# Patient Record
Sex: MALE | Race: WHITE | Employment: FULL TIME | URBAN - NONMETROPOLITAN AREA
[De-identification: names, ages, dates, MRNs, and addresses within clinical notes are randomized per-mention and may not be internally consistent; named-entity substitution may affect disease eponyms.]

---

## 2017-01-04 RX ORDER — INSULIN GLARGINE 100 [IU]/ML
INJECTION, SOLUTION SUBCUTANEOUS
Qty: 90 PEN | Refills: 2 | Status: SHIPPED | OUTPATIENT
Start: 2017-01-04 | End: 2017-04-28 | Stop reason: SDUPTHER

## 2017-01-04 RX ORDER — INSULIN ASPART 100 [IU]/ML
INJECTION, SUSPENSION SUBCUTANEOUS
Qty: 180 PEN | Refills: 2 | Status: SHIPPED | OUTPATIENT
Start: 2017-01-04 | End: 2017-04-28 | Stop reason: SDUPTHER

## 2017-04-22 ENCOUNTER — OFFICE VISIT (OUTPATIENT)
Dept: FAMILY MEDICINE CLINIC | Age: 57
End: 2017-04-22

## 2017-04-22 VITALS
HEART RATE: 108 BPM | OXYGEN SATURATION: 96 % | SYSTOLIC BLOOD PRESSURE: 108 MMHG | DIASTOLIC BLOOD PRESSURE: 74 MMHG | BODY MASS INDEX: 45.1 KG/M2 | WEIGHT: 315 LBS | HEIGHT: 70 IN

## 2017-04-22 DIAGNOSIS — N52.9 ERECTILE DYSFUNCTION, UNSPECIFIED ERECTILE DYSFUNCTION TYPE: ICD-10-CM

## 2017-04-22 DIAGNOSIS — G47.33 OSA ON CPAP: ICD-10-CM

## 2017-04-22 DIAGNOSIS — E55.9 VITAMIN D DEFICIENCY: ICD-10-CM

## 2017-04-22 DIAGNOSIS — I10 ESSENTIAL HYPERTENSION: Primary | ICD-10-CM

## 2017-04-22 DIAGNOSIS — Z99.89 OSA ON CPAP: ICD-10-CM

## 2017-04-22 DIAGNOSIS — Z12.5 PROSTATE CANCER SCREENING: ICD-10-CM

## 2017-04-22 DIAGNOSIS — E78.5 HYPERLIPIDEMIA, UNSPECIFIED HYPERLIPIDEMIA TYPE: ICD-10-CM

## 2017-04-22 DIAGNOSIS — K21.9 GASTROESOPHAGEAL REFLUX DISEASE, ESOPHAGITIS PRESENCE NOT SPECIFIED: ICD-10-CM

## 2017-04-22 DIAGNOSIS — E66.01 OBESITY, MORBID, BMI 50 OR HIGHER (HCC): ICD-10-CM

## 2017-04-22 PROCEDURE — 99214 OFFICE O/P EST MOD 30 MIN: CPT | Performed by: FAMILY MEDICINE

## 2017-04-22 RX ORDER — LISINOPRIL AND HYDROCHLOROTHIAZIDE 12.5; 1 MG/1; MG/1
1 TABLET ORAL DAILY
Qty: 30 TABLET | Refills: 0 | Status: SHIPPED | OUTPATIENT
Start: 2017-04-22 | End: 2017-04-22

## 2017-04-22 ASSESSMENT — ENCOUNTER SYMPTOMS
ABDOMINAL PAIN: 0
BLURRED VISION: 0
SHORTNESS OF BREATH: 0

## 2017-04-26 DIAGNOSIS — Z12.5 PROSTATE CANCER SCREENING: ICD-10-CM

## 2017-04-26 DIAGNOSIS — N52.9 ERECTILE DYSFUNCTION, UNSPECIFIED ERECTILE DYSFUNCTION TYPE: ICD-10-CM

## 2017-04-26 DIAGNOSIS — I10 ESSENTIAL HYPERTENSION: ICD-10-CM

## 2017-04-26 DIAGNOSIS — E78.5 HYPERLIPIDEMIA, UNSPECIFIED HYPERLIPIDEMIA TYPE: ICD-10-CM

## 2017-04-26 DIAGNOSIS — E66.01 OBESITY, MORBID, BMI 50 OR HIGHER (HCC): ICD-10-CM

## 2017-04-26 LAB
ALT SERPL-CCNC: 27 U/L (ref 0–41)
ANION GAP SERPL CALCULATED.3IONS-SCNC: 15 MEQ/L (ref 7–13)
AST SERPL-CCNC: 21 U/L (ref 0–40)
BASOPHILS ABSOLUTE: 0 K/UL (ref 0–0.2)
BASOPHILS RELATIVE PERCENT: 0.4 %
BILIRUBIN URINE: NEGATIVE
BLOOD, URINE: NEGATIVE
BUN BLDV-MCNC: 29 MG/DL (ref 6–20)
CALCIUM SERPL-MCNC: 9.4 MG/DL (ref 8.6–10.2)
CHLORIDE BLD-SCNC: 100 MEQ/L (ref 98–107)
CHOLESTEROL, TOTAL: 109 MG/DL (ref 0–199)
CLARITY: CLEAR
CO2: 26 MEQ/L (ref 22–29)
COLOR: YELLOW
CREAT SERPL-MCNC: 1.3 MG/DL (ref 0.7–1.2)
CREATININE URINE: 101 MG/DL
EOSINOPHILS ABSOLUTE: 0.1 K/UL (ref 0–0.7)
EOSINOPHILS RELATIVE PERCENT: 1 %
GFR AFRICAN AMERICAN: >60
GFR NON-AFRICAN AMERICAN: 56.9
GLUCOSE BLD-MCNC: 277 MG/DL (ref 74–109)
GLUCOSE URINE: >=1000 MG/DL
HBA1C MFR BLD: 11.9 % (ref 4.8–5.9)
HCT VFR BLD CALC: 43.3 % (ref 42–52)
HDLC SERPL-MCNC: 25 MG/DL (ref 40–59)
HEMOGLOBIN: 14.1 G/DL (ref 14–18)
KETONES, URINE: NEGATIVE MG/DL
LDL CHOLESTEROL CALCULATED: 65 MG/DL (ref 0–129)
LEUKOCYTE ESTERASE, URINE: NEGATIVE
LYMPHOCYTES ABSOLUTE: 1.5 K/UL (ref 1–4.8)
LYMPHOCYTES RELATIVE PERCENT: 19 %
MCH RBC QN AUTO: 28.8 PG (ref 27–31.3)
MCHC RBC AUTO-ENTMCNC: 32.7 % (ref 33–37)
MCV RBC AUTO: 88.1 FL (ref 80–100)
MICROALBUMIN UR-MCNC: 3.4 MG/DL
MICROALBUMIN/CREAT UR-RTO: 33.7 MG/G (ref 0–30)
MONOCYTES ABSOLUTE: 0.7 K/UL (ref 0.2–0.8)
MONOCYTES RELATIVE PERCENT: 8.5 %
NEUTROPHILS ABSOLUTE: 5.7 K/UL (ref 1.4–6.5)
NEUTROPHILS RELATIVE PERCENT: 71.1 %
NITRITE, URINE: NEGATIVE
PDW BLD-RTO: 14.1 % (ref 11.5–14.5)
PH UA: 5 (ref 5–9)
PLATELET # BLD: 214 K/UL (ref 130–400)
POTASSIUM SERPL-SCNC: 4.7 MEQ/L (ref 3.5–5.1)
PROSTATE SPECIFIC ANTIGEN: 0.37 NG/ML (ref 0–3.89)
PROTEIN UA: ABNORMAL MG/DL
RBC # BLD: 4.91 M/UL (ref 4.7–6.1)
SODIUM BLD-SCNC: 141 MEQ/L (ref 132–144)
SPECIFIC GRAVITY UA: 1.02 (ref 1–1.03)
TRIGL SERPL-MCNC: 96 MG/DL (ref 0–200)
TSH SERPL DL<=0.05 MIU/L-ACNC: 1.57 UIU/ML (ref 0.27–4.2)
UROBILINOGEN, URINE: 0.2 E.U./DL
WBC # BLD: 8 K/UL (ref 4.8–10.8)

## 2017-04-27 LAB — TESTOSTERONE TOTAL-MALE: 118 NG/DL (ref 300–890)

## 2017-04-28 ENCOUNTER — OFFICE VISIT (OUTPATIENT)
Dept: SURGERY | Age: 57
End: 2017-04-28

## 2017-04-28 VITALS
SYSTOLIC BLOOD PRESSURE: 94 MMHG | BODY MASS INDEX: 45.1 KG/M2 | HEART RATE: 112 BPM | DIASTOLIC BLOOD PRESSURE: 60 MMHG | HEIGHT: 70 IN | WEIGHT: 315 LBS

## 2017-04-28 DIAGNOSIS — E66.01 MORBID OBESITY DUE TO EXCESS CALORIES (HCC): ICD-10-CM

## 2017-04-28 DIAGNOSIS — E29.1 HYPOGONADISM MALE: Primary | ICD-10-CM

## 2017-04-28 LAB — GLUCOSE BLD-MCNC: 329 MG/DL

## 2017-04-28 PROCEDURE — 82962 GLUCOSE BLOOD TEST: CPT | Performed by: INTERNAL MEDICINE

## 2017-04-28 PROCEDURE — 99213 OFFICE O/P EST LOW 20 MIN: CPT | Performed by: INTERNAL MEDICINE

## 2017-04-29 PROBLEM — E29.1 HYPOGONADISM IN MALE: Status: ACTIVE | Noted: 2017-04-01

## 2017-05-01 DIAGNOSIS — E29.1 HYPOGONADISM IN MALE: Primary | ICD-10-CM

## 2017-05-01 RX ORDER — TESTOSTERONE 12.5 MG/1.25G
GEL TOPICAL
Qty: 1 BOTTLE | Refills: 5 | Status: SHIPPED | OUTPATIENT
Start: 2017-05-01 | End: 2017-05-02 | Stop reason: ALTCHOICE

## 2017-05-02 DIAGNOSIS — E29.1 HYPOGONADISM IN MALE: Primary | ICD-10-CM

## 2017-05-02 RX ORDER — TESTOSTERONE 20.25 MG/1.25G
GEL TOPICAL
Qty: 1.25 G | Refills: 5 | OUTPATIENT
Start: 2017-05-02 | End: 2018-01-26 | Stop reason: SDUPTHER

## 2017-05-22 ENCOUNTER — OFFICE VISIT (OUTPATIENT)
Dept: FAMILY MEDICINE CLINIC | Age: 57
End: 2017-05-22

## 2017-05-22 VITALS
OXYGEN SATURATION: 96 % | TEMPERATURE: 98.1 F | HEART RATE: 105 BPM | SYSTOLIC BLOOD PRESSURE: 120 MMHG | DIASTOLIC BLOOD PRESSURE: 74 MMHG | BODY MASS INDEX: 45.1 KG/M2 | WEIGHT: 315 LBS | HEIGHT: 70 IN

## 2017-05-22 DIAGNOSIS — R06.2 WHEEZING: ICD-10-CM

## 2017-05-22 DIAGNOSIS — R05.9 COUGH: ICD-10-CM

## 2017-05-22 DIAGNOSIS — J20.9 ACUTE BRONCHITIS, UNSPECIFIED ORGANISM: Primary | ICD-10-CM

## 2017-05-22 PROCEDURE — 99213 OFFICE O/P EST LOW 20 MIN: CPT | Performed by: NURSE PRACTITIONER

## 2017-05-22 RX ORDER — PROMETHAZINE HYDROCHLORIDE AND CODEINE PHOSPHATE 6.25; 1 MG/5ML; MG/5ML
5 SYRUP ORAL EVERY 4 HOURS PRN
Qty: 200 ML | Refills: 0 | Status: SHIPPED | OUTPATIENT
Start: 2017-05-22 | End: 2017-05-29

## 2017-05-22 RX ORDER — METHYLPREDNISOLONE 4 MG/1
TABLET ORAL
Qty: 21 TABLET | Refills: 0 | Status: SHIPPED | OUTPATIENT
Start: 2017-05-22 | End: 2017-05-28

## 2017-05-22 RX ORDER — AZITHROMYCIN 250 MG/1
TABLET, FILM COATED ORAL
Qty: 1 PACKET | Refills: 0 | Status: SHIPPED | OUTPATIENT
Start: 2017-05-22 | End: 2017-07-20 | Stop reason: ALTCHOICE

## 2017-05-22 ASSESSMENT — PATIENT HEALTH QUESTIONNAIRE - PHQ9
1. LITTLE INTEREST OR PLEASURE IN DOING THINGS: 0
2. FEELING DOWN, DEPRESSED OR HOPELESS: 0
SUM OF ALL RESPONSES TO PHQ QUESTIONS 1-9: 0
SUM OF ALL RESPONSES TO PHQ9 QUESTIONS 1 & 2: 0

## 2017-05-22 ASSESSMENT — ENCOUNTER SYMPTOMS
WHEEZING: 1
SINUS PRESSURE: 1
SHORTNESS OF BREATH: 1
RHINORRHEA: 1
COUGH: 1
SORE THROAT: 1

## 2017-05-24 ENCOUNTER — PROCEDURE VISIT (OUTPATIENT)
Dept: FAMILY MEDICINE CLINIC | Age: 57
End: 2017-05-24

## 2017-05-24 VITALS
WEIGHT: 315 LBS | HEART RATE: 88 BPM | OXYGEN SATURATION: 93 % | HEIGHT: 70 IN | BODY MASS INDEX: 45.1 KG/M2 | SYSTOLIC BLOOD PRESSURE: 132 MMHG | DIASTOLIC BLOOD PRESSURE: 82 MMHG

## 2017-05-24 DIAGNOSIS — L91.8 CUTANEOUS SKIN TAGS: Primary | ICD-10-CM

## 2017-05-24 DIAGNOSIS — N28.9 RENAL INSUFFICIENCY: ICD-10-CM

## 2017-05-24 LAB
ANION GAP SERPL CALCULATED.3IONS-SCNC: 15 MEQ/L (ref 7–13)
BUN BLDV-MCNC: 20 MG/DL (ref 6–20)
CALCIUM SERPL-MCNC: 8.9 MG/DL (ref 8.6–10.2)
CHLORIDE BLD-SCNC: 102 MEQ/L (ref 98–107)
CO2: 26 MEQ/L (ref 22–29)
CREAT SERPL-MCNC: 0.78 MG/DL (ref 0.7–1.2)
GFR AFRICAN AMERICAN: >60
GFR NON-AFRICAN AMERICAN: >60
GLUCOSE BLD-MCNC: 156 MG/DL (ref 74–109)
POTASSIUM SERPL-SCNC: 4.6 MEQ/L (ref 3.5–5.1)
SODIUM BLD-SCNC: 143 MEQ/L (ref 132–144)

## 2017-05-24 PROCEDURE — 11200 RMVL SKIN TAGS UP TO&INC 15: CPT | Performed by: FAMILY MEDICINE

## 2017-05-24 PROCEDURE — 11201 RMVL SKIN TAGS EA ADDL 10: CPT | Performed by: FAMILY MEDICINE

## 2017-05-24 ASSESSMENT — ENCOUNTER SYMPTOMS
ABDOMINAL PAIN: 0
SHORTNESS OF BREATH: 0

## 2017-06-02 ENCOUNTER — TELEPHONE (OUTPATIENT)
Dept: FAMILY MEDICINE CLINIC | Age: 57
End: 2017-06-02

## 2017-07-20 ENCOUNTER — OFFICE VISIT (OUTPATIENT)
Dept: FAMILY MEDICINE CLINIC | Age: 57
End: 2017-07-20

## 2017-07-20 VITALS
OXYGEN SATURATION: 95 % | SYSTOLIC BLOOD PRESSURE: 116 MMHG | HEIGHT: 70 IN | BODY MASS INDEX: 45.1 KG/M2 | WEIGHT: 315 LBS | DIASTOLIC BLOOD PRESSURE: 80 MMHG | HEART RATE: 111 BPM

## 2017-07-20 DIAGNOSIS — I10 ESSENTIAL HYPERTENSION: Primary | ICD-10-CM

## 2017-07-20 DIAGNOSIS — Z11.59 NEED FOR HEPATITIS C SCREENING TEST: ICD-10-CM

## 2017-07-20 DIAGNOSIS — I10 ESSENTIAL HYPERTENSION: ICD-10-CM

## 2017-07-20 DIAGNOSIS — E66.01 OBESITY, MORBID, BMI 50 OR HIGHER (HCC): ICD-10-CM

## 2017-07-20 DIAGNOSIS — R53.83 FATIGUE, UNSPECIFIED TYPE: ICD-10-CM

## 2017-07-20 DIAGNOSIS — E78.5 HYPERLIPIDEMIA, UNSPECIFIED HYPERLIPIDEMIA TYPE: ICD-10-CM

## 2017-07-20 LAB
ANION GAP SERPL CALCULATED.3IONS-SCNC: 11 MEQ/L (ref 7–13)
BUN BLDV-MCNC: 29 MG/DL (ref 6–20)
CALCIUM SERPL-MCNC: 9.2 MG/DL (ref 8.6–10.2)
CHLORIDE BLD-SCNC: 100 MEQ/L (ref 98–107)
CO2: 29 MEQ/L (ref 22–29)
CREAT SERPL-MCNC: 0.96 MG/DL (ref 0.7–1.2)
GFR AFRICAN AMERICAN: >60
GFR NON-AFRICAN AMERICAN: >60
GLUCOSE BLD-MCNC: 242 MG/DL (ref 74–109)
HBA1C MFR BLD: 10.1 % (ref 4.8–5.9)
HEPATITIS C ANTIBODY INTERPRETATION: NORMAL
POTASSIUM SERPL-SCNC: 4.6 MEQ/L (ref 3.5–5.1)
SODIUM BLD-SCNC: 140 MEQ/L (ref 132–144)
VITAMIN B-12: 318 PG/ML (ref 211–946)

## 2017-07-20 PROCEDURE — 99214 OFFICE O/P EST MOD 30 MIN: CPT | Performed by: FAMILY MEDICINE

## 2017-07-20 RX ORDER — LISINOPRIL AND HYDROCHLOROTHIAZIDE 12.5; 1 MG/1; MG/1
1 TABLET ORAL DAILY
Qty: 30 TABLET | Refills: 0 | Status: SHIPPED | OUTPATIENT
Start: 2017-07-20 | End: 2017-08-28 | Stop reason: SDUPTHER

## 2017-07-20 ASSESSMENT — ENCOUNTER SYMPTOMS
ABDOMINAL PAIN: 0
SHORTNESS OF BREATH: 0

## 2017-08-28 DIAGNOSIS — I10 ESSENTIAL HYPERTENSION: ICD-10-CM

## 2017-08-28 RX ORDER — LISINOPRIL AND HYDROCHLOROTHIAZIDE 12.5; 1 MG/1; MG/1
1 TABLET ORAL DAILY
Qty: 90 TABLET | Refills: 3 | Status: SHIPPED | OUTPATIENT
Start: 2017-08-28 | End: 2018-01-30 | Stop reason: SINTOL

## 2017-09-08 ENCOUNTER — OFFICE VISIT (OUTPATIENT)
Dept: SURGERY | Age: 57
End: 2017-09-08

## 2017-09-08 VITALS
BODY MASS INDEX: 45.1 KG/M2 | WEIGHT: 315 LBS | DIASTOLIC BLOOD PRESSURE: 82 MMHG | HEIGHT: 70 IN | SYSTOLIC BLOOD PRESSURE: 138 MMHG | HEART RATE: 110 BPM

## 2017-09-08 LAB — GLUCOSE BLD-MCNC: 289 MG/DL

## 2017-09-08 PROCEDURE — 99213 OFFICE O/P EST LOW 20 MIN: CPT | Performed by: INTERNAL MEDICINE

## 2017-09-08 PROCEDURE — 82962 GLUCOSE BLOOD TEST: CPT | Performed by: INTERNAL MEDICINE

## 2017-11-28 ENCOUNTER — TELEPHONE (OUTPATIENT)
Dept: FAMILY MEDICINE CLINIC | Age: 57
End: 2017-11-28

## 2017-11-29 ENCOUNTER — OFFICE VISIT (OUTPATIENT)
Dept: FAMILY MEDICINE CLINIC | Age: 57
End: 2017-11-29

## 2017-11-29 VITALS
BODY MASS INDEX: 45.1 KG/M2 | TEMPERATURE: 98.2 F | OXYGEN SATURATION: 95 % | DIASTOLIC BLOOD PRESSURE: 76 MMHG | SYSTOLIC BLOOD PRESSURE: 122 MMHG | WEIGHT: 315 LBS | HEART RATE: 109 BPM | HEIGHT: 70 IN

## 2017-11-29 DIAGNOSIS — J20.9 ACUTE BRONCHITIS, UNSPECIFIED ORGANISM: ICD-10-CM

## 2017-11-29 DIAGNOSIS — R06.02 SHORTNESS OF BREATH: Primary | ICD-10-CM

## 2017-11-29 PROCEDURE — 99213 OFFICE O/P EST LOW 20 MIN: CPT | Performed by: NURSE PRACTITIONER

## 2017-11-29 RX ORDER — METHYLPREDNISOLONE 4 MG/1
TABLET ORAL
Qty: 21 TABLET | Refills: 0 | Status: SHIPPED | OUTPATIENT
Start: 2017-11-29 | End: 2017-12-05

## 2017-11-29 RX ORDER — ALBUTEROL SULFATE 90 UG/1
2 AEROSOL, METERED RESPIRATORY (INHALATION) EVERY 6 HOURS PRN
Qty: 1 INHALER | Refills: 3 | Status: SHIPPED | OUTPATIENT
Start: 2017-11-29 | End: 2018-01-26 | Stop reason: SDUPTHER

## 2017-11-29 RX ORDER — DOXYCYCLINE HYCLATE 100 MG
100 TABLET ORAL 2 TIMES DAILY
Qty: 20 TABLET | Refills: 0 | Status: SHIPPED | OUTPATIENT
Start: 2017-11-29 | End: 2017-12-09

## 2017-11-29 ASSESSMENT — ENCOUNTER SYMPTOMS
SHORTNESS OF BREATH: 1
WHEEZING: 1
RHINORRHEA: 1
EYES NEGATIVE: 1
COUGH: 1

## 2017-11-29 NOTE — PROGRESS NOTES
Subjective  Chief Complaint   Patient presents with    Cough     pt states that he went to [facility] in clinic and was diagnosed with bronchitis & a virus and states that his cough is not getting better. states that tessalon perles are not helping and that it was making him hallucinate. Patient states he is having less secretions than before, but the cough still takes his breath away and he fells like he can't stop coughing. Denies any further fevers. Can't sleep at night due to the cough. Cough   This is a new problem. The current episode started in the past 7 days. The problem has been unchanged. The problem occurs every few minutes. The cough is productive of sputum. Associated symptoms include ear congestion, a fever, myalgias, nasal congestion, rhinorrhea, shortness of breath and wheezing. Pertinent negatives include no chest pain or ear pain. The symptoms are aggravated by lying down. He has tried prescription cough suppressant and OTC cough suppressant (mucinex, abx) for the symptoms. The treatment provided mild relief. His past medical history is significant for bronchitis.        Patient Active Problem List    Diagnosis Date Noted    ED (erectile dysfunction)     GERD (gastroesophageal reflux disease)     EVELYN on CPAP     Obesity, morbid, BMI 50 or higher ([address]     Hypogonadism in male 04/01/2017    Diabetes mellitus out of control ([address] 09/17/2011    Hypertension 09/17/2011    Hyperlipidemia 09/17/2011    Vitamin D deficiency 10/17/2008     Past Medical History:   Diagnosis Date    ED (erectile dysfunction)     GERD (gastroesophageal reflux disease)     History of cardiovascular stress test     Normal; Saw Dr. Radha Lee History of EKG 10/29/14    Hyperlipidemia     Hypertension     Hypogonadism in male 04/2017    Obesity, morbid, BMI 50 or higher ([address]     EVELYN on CPAP     Type II or unspecified type diabetes mellitus without mention of complication, not stated as uncontrolled Vitals reviewed. Assessment & Plan    1. Shortness of breath  albuterol sulfate HFA (VENTOLIN HFA) 108 (90 Base) MCG/ACT inhaler    methylPREDNISolone (MEDROL DOSEPACK) 4 MG tablet    XR CHEST STANDARD (2 VW)   2. Acute bronchitis, unspecified organism  albuterol sulfate HFA (VENTOLIN HFA) 108 (90 Base) MCG/ACT inhaler    methylPREDNISolone (MEDROL DOSEPACK) 4 MG tablet    doxycycline hyclate (VIBRA-TABS) 100 MG tablet    XR CHEST STANDARD (2 VW)     Pt advised to rest and drink plenty of fluids. Finish all antibiotics even if feeling better. OTC analgesics for symptom management. Salt water gargle for sore throat. Chest xray as ordered today. Pt will monitor blood glucose closely at home d/t risk of increased glucose with medrol dosepack. If he sees increase in his numbers, he will call Dr. Jeana Mosher to get further input as far as insulin recommendations. RTO if symptoms worsen or if no improvement in 72 hours. Side effects, adverse effects of the medication prescribed today, as well as treatment plan/ rationale and result expectations have been discussed with the patient who expresses understanding and desires to proceed. Close follow up to evaluate treatment results and for coordination of care. I have reviewed the patient's medical history in detail and updated the computerized patient record. As always, patient is advised that if symptoms worsen in any way they will proceed to the nearest emergency room.        Orders Placed This Encounter   Procedures    XR CHEST STANDARD (2 VW)     Standing Status:   Future     Number of Occurrences:   1     Standing Expiration Date:   11/29/2018     Order Specific Question:   Reason for exam:     Answer:   bronchitis and shortness of breath       Orders Placed This Encounter   Medications    albuterol sulfate HFA (VENTOLIN HFA) 108 (90 Base) MCG/ACT inhaler     Sig: Inhale 2 puffs into the lungs every 6 hours as needed for Wheezing     Dispense:  1 Inhaler     Refill:  3    methylPREDNISolone (MEDROL DOSEPACK) 4 MG tablet     Sig: Take by mouth. Dispense:  21 tablet     Refill:  0    doxycycline hyclate (VIBRA-TABS) 100 MG tablet     Sig: Take 1 tablet by mouth 2 times daily for 10 days     Dispense:  20 tablet     Refill:  0       Return if symptoms worsen or fail to improve.     Serene Sanchez, CNP

## 2017-12-11 ENCOUNTER — TELEPHONE (OUTPATIENT)
Dept: FAMILY MEDICINE CLINIC | Age: 57
End: 2017-12-11

## 2017-12-11 RX ORDER — PROMETHAZINE HYDROCHLORIDE AND CODEINE PHOSPHATE 6.25; 1 MG/5ML; MG/5ML
5 SYRUP ORAL EVERY 6 HOURS PRN
Qty: 118 ML | Refills: 0 | Status: SHIPPED | OUTPATIENT
Start: 2017-12-11 | End: 2017-12-18

## 2017-12-19 ENCOUNTER — TELEPHONE (OUTPATIENT)
Dept: FAMILY MEDICINE CLINIC | Age: 57
End: 2017-12-19

## 2017-12-19 NOTE — TELEPHONE ENCOUNTER
He was on an antibiotic/doxycycline along with a Medrol Dosepak and an inhaler called albuterol prescribed by [facility] a few weeks ago. He had a chest x-ray less than 3 weeks ago that did not show any active disease. I would recommend that he take Mucinex DM which is available over-the-counter; he can get the 12 hour formulation and take 1 tablet twice a day for 7-10 days. If he is not feeling any better after that he should probably be seen.

## 2017-12-19 NOTE — TELEPHONE ENCOUNTER
Pt was seen by Justen Fowler on 11/28 and went to the walk-in clinic on 11/25 for bronchitis symptoms. Pt is still coughing a lot and was wondering if there was something different that you could call in to the pharmacy?  ROXI MAYES

## 2018-01-05 RX ORDER — OMEPRAZOLE 20 MG/1
CAPSULE, DELAYED RELEASE ORAL
Qty: 90 CAPSULE | Status: CANCELLED | OUTPATIENT
Start: 2018-01-05

## 2018-01-09 RX ORDER — OMEPRAZOLE 20 MG/1
CAPSULE, DELAYED RELEASE ORAL
Qty: 90 CAPSULE | Refills: 1 | Status: SHIPPED | OUTPATIENT
Start: 2018-01-09 | End: 2018-06-10 | Stop reason: SDUPTHER

## 2018-01-22 ENCOUNTER — TELEPHONE (OUTPATIENT)
Dept: FAMILY MEDICINE CLINIC | Age: 58
End: 2018-01-22

## 2018-01-23 ENCOUNTER — HOSPITAL ENCOUNTER (OUTPATIENT)
Dept: GENERAL RADIOLOGY | Age: 58
Discharge: HOME | End: 2018-01-23
Payer: COMMERCIAL

## 2018-01-23 ENCOUNTER — OFFICE VISIT (OUTPATIENT)
Dept: FAMILY MEDICINE CLINIC | Age: 58
End: 2018-01-23
Payer: COMMERCIAL

## 2018-01-23 VITALS
TEMPERATURE: 98 F | WEIGHT: 315 LBS | HEIGHT: 70 IN | DIASTOLIC BLOOD PRESSURE: 70 MMHG | SYSTOLIC BLOOD PRESSURE: 130 MMHG | HEART RATE: 108 BPM | BODY MASS INDEX: 45.1 KG/M2 | OXYGEN SATURATION: 94 %

## 2018-01-23 DIAGNOSIS — E78.5 HYPERLIPIDEMIA, UNSPECIFIED HYPERLIPIDEMIA TYPE: ICD-10-CM

## 2018-01-23 DIAGNOSIS — R06.02 SHORTNESS OF BREATH: ICD-10-CM

## 2018-01-23 DIAGNOSIS — I10 ESSENTIAL HYPERTENSION: ICD-10-CM

## 2018-01-23 DIAGNOSIS — R05.3 CHRONIC COUGH: ICD-10-CM

## 2018-01-23 DIAGNOSIS — R63.5 WEIGHT GAIN: ICD-10-CM

## 2018-01-23 DIAGNOSIS — R05.3 CHRONIC COUGH: Primary | ICD-10-CM

## 2018-01-23 DIAGNOSIS — R60.0 LOCALIZED EDEMA: ICD-10-CM

## 2018-01-23 LAB
ALBUMIN SERPL-MCNC: 3.8 G/DL (ref 3.9–4.9)
ALP BLD-CCNC: 98 U/L (ref 35–104)
ALT SERPL-CCNC: 32 U/L (ref 0–41)
ANION GAP SERPL CALCULATED.3IONS-SCNC: 17 MEQ/L (ref 7–13)
AST SERPL-CCNC: 22 U/L (ref 0–40)
BILIRUB SERPL-MCNC: 0.1 MG/DL (ref 0–1.2)
BUN BLDV-MCNC: 25 MG/DL (ref 6–20)
CALCIUM SERPL-MCNC: 9.1 MG/DL (ref 8.6–10.2)
CHLORIDE BLD-SCNC: 103 MEQ/L (ref 98–107)
CO2: 27 MEQ/L (ref 22–29)
CREAT SERPL-MCNC: 1.07 MG/DL (ref 0.7–1.2)
GFR AFRICAN AMERICAN: >60
GFR NON-AFRICAN AMERICAN: >60
GLOBULIN: 2.8 G/DL (ref 2.3–3.5)
GLUCOSE BLD-MCNC: 240 MG/DL (ref 74–109)
HCT VFR BLD CALC: 42.4 % (ref 42–52)
HEMOGLOBIN: 13.5 G/DL (ref 14–18)
MCH RBC QN AUTO: 30 PG (ref 27–31.3)
MCHC RBC AUTO-ENTMCNC: 32 % (ref 33–37)
MCV RBC AUTO: 93.8 FL (ref 80–100)
PDW BLD-RTO: 15 % (ref 11.5–14.5)
PLATELET # BLD: 222 K/UL (ref 130–400)
POTASSIUM SERPL-SCNC: 4.9 MEQ/L (ref 3.5–5.1)
PRO-BNP: 111 PG/ML
RBC # BLD: 4.52 M/UL (ref 4.7–6.1)
SODIUM BLD-SCNC: 147 MEQ/L (ref 132–144)
TOTAL PROTEIN: 6.6 G/DL (ref 6.4–8.1)
WBC # BLD: 8.7 K/UL (ref 4.8–10.8)

## 2018-01-23 PROCEDURE — 99214 OFFICE O/P EST MOD 30 MIN: CPT | Performed by: NURSE PRACTITIONER

## 2018-01-23 PROCEDURE — 93000 ELECTROCARDIOGRAM COMPLETE: CPT | Performed by: NURSE PRACTITIONER

## 2018-01-23 PROCEDURE — 71046 X-RAY EXAM CHEST 2 VIEWS: CPT

## 2018-01-23 RX ORDER — METHYLPREDNISOLONE 4 MG/1
TABLET ORAL
Qty: 21 TABLET | Refills: 0 | Status: SHIPPED | OUTPATIENT
Start: 2018-01-23 | End: 2018-01-29

## 2018-01-23 RX ORDER — FUROSEMIDE 20 MG/1
20 TABLET ORAL DAILY
Qty: 30 TABLET | Refills: 0 | Status: SHIPPED | OUTPATIENT
Start: 2018-01-23 | End: 2018-04-04 | Stop reason: ALTCHOICE

## 2018-01-23 ASSESSMENT — ENCOUNTER SYMPTOMS
SINUS PAIN: 0
SINUS PRESSURE: 0
WHEEZING: 1
SHORTNESS OF BREATH: 1
RHINORRHEA: 0
COUGH: 1

## 2018-01-23 NOTE — PROGRESS NOTES
Subjective  Chief Complaint   Patient presents with    Cough     pt states that he has been fighting a cough since the end of November. Cough   This is a recurrent problem. The current episode started more than 1 month ago (since november). The problem has been unchanged. The cough is non-productive. Associated symptoms include ear congestion, ear pain, a fever, nasal congestion, postnasal drip, shortness of breath, sweats and wheezing. Pertinent negatives include no chest pain, chills or rhinorrhea. The symptoms are aggravated by lying down. He has tried prescription cough suppressant and steroid inhaler (augmentin, omnicef, doxycycline) for the symptoms. The treatment provided mild relief. His past medical history is significant for pneumonia. There is no history of asthma. He did feel better while he was on the antibiotics and steroids, but the cough never went away. Says his tongue has been raw since taking the augmentin. Has also been having significant swelling of his legs that has been there for about a month now. Says he has had swelling in the past but usually the hctz that he is on will take care of it. Has not had it this bad before. Has seen Dr. Basim Hendrix in the past but it has been over 2 years ago. Has shortness of breath that is worse when he lays down. Says he cannot lay down to sleep. Has been sleeping in a chair.     Patient Active Problem List    Diagnosis Date Noted    ED (erectile dysfunction)     GERD (gastroesophageal reflux disease)     EVELYN on CPAP     Obesity, morbid, BMI 50 or higher ([address]     Hypogonadism in male 04/01/2017    Diabetes mellitus out of control ([address] 09/17/2011    Hypertension 09/17/2011    Hyperlipidemia 09/17/2011    Vitamin D deficiency 10/17/2008     Past Medical History:   Diagnosis Date    ED (erectile dysfunction)     GERD (gastroesophageal reflux disease)     History of cardiovascular stress test     Normal; Saw Dr. Madan Goldstein History of Neurological: He is alert and oriented to person, place, and time. Skin: Skin is warm and dry. No rash noted. He is not diaphoretic. No erythema. No pallor. Psychiatric: He has a normal mood and affect. His behavior is normal. Judgment and thought content normal.     Assessment & Plan    1. Chronic cough  XR CHEST STANDARD (2 VW)    methylPREDNISolone (MEDROL DOSEPACK) 4 MG tablet   2. Shortness of breath  Brain Natriuretic Peptide    CBC    XR CHEST STANDARD (2 VW)    methylPREDNISolone (MEDROL DOSEPACK) 4 MG tablet    EKG 12 lead    [address], [place] - In[facility] Cardiology   3. Localized edema  Brain Natriuretic Peptide    Comprehensive Metabolic Panel    XR CHEST STANDARD (2 VW)    furosemide (LASIX) 20 MG tablet    [address] - [facility]   4. Weight gain  Brain Natriuretic Peptide    Comprehensive Metabolic Panel   5. Essential hypertension  Ad Leiva MD [facility]) - Invasive Cardiology   6. Hyperlipidemia, unspecified hyperlipidemia type  [facility] [address], [place] - Invasive Cardiology     Plan as above. Will call with all results. Start lasix 20 mg daily. Referral to cardiology. F/u on Friday for recheck. ER if symptoms worsen at all. Side effects, adverse effects of the medication prescribed today, as well as treatment plan/ rationale and result expectations have been discussed with the patient who expresses understanding and desires to proceed. Close follow up to evaluate treatment results and for coordination of care. I have reviewed the patient's medical history in detail and updated the computerized patient record. As always, patient is advised that if symptoms worsen in any way they will proceed to the nearest emergency room.        Orders Placed This Encounter   Procedures    XR CHEST STANDARD (2 VW)     Standing Status:   Future     Standing Expiration Date:   1/23/2019     Order Specific

## 2018-01-24 DIAGNOSIS — R06.02 SHORTNESS OF BREATH: Primary | ICD-10-CM

## 2018-01-26 ENCOUNTER — OFFICE VISIT (OUTPATIENT)
Dept: FAMILY MEDICINE CLINIC | Age: 58
End: 2018-01-26
Payer: COMMERCIAL

## 2018-01-26 VITALS
HEIGHT: 70 IN | BODY MASS INDEX: 45.1 KG/M2 | WEIGHT: 315 LBS | DIASTOLIC BLOOD PRESSURE: 76 MMHG | OXYGEN SATURATION: 93 % | HEART RATE: 105 BPM | SYSTOLIC BLOOD PRESSURE: 120 MMHG | TEMPERATURE: 98.2 F

## 2018-01-26 DIAGNOSIS — R06.02 SHORTNESS OF BREATH: Primary | ICD-10-CM

## 2018-01-26 DIAGNOSIS — E78.5 HYPERLIPIDEMIA, UNSPECIFIED HYPERLIPIDEMIA TYPE: ICD-10-CM

## 2018-01-26 DIAGNOSIS — R60.0 LOCALIZED EDEMA: ICD-10-CM

## 2018-01-26 DIAGNOSIS — E29.1 HYPOGONADISM IN MALE: ICD-10-CM

## 2018-01-26 DIAGNOSIS — J20.9 ACUTE BRONCHITIS, UNSPECIFIED ORGANISM: ICD-10-CM

## 2018-01-26 DIAGNOSIS — G47.33 OSA ON CPAP: ICD-10-CM

## 2018-01-26 DIAGNOSIS — I10 ESSENTIAL HYPERTENSION: ICD-10-CM

## 2018-01-26 DIAGNOSIS — Z99.89 OSA ON CPAP: ICD-10-CM

## 2018-01-26 DIAGNOSIS — E66.01 OBESITY, MORBID, BMI 50 OR HIGHER (HCC): ICD-10-CM

## 2018-01-26 PROCEDURE — 99214 OFFICE O/P EST MOD 30 MIN: CPT | Performed by: NURSE PRACTITIONER

## 2018-01-26 RX ORDER — FLUTICASONE PROPIONATE 50 MCG
2 SPRAY, SUSPENSION (ML) NASAL DAILY
Qty: 1 BOTTLE | Refills: 0 | Status: CANCELLED | OUTPATIENT
Start: 2018-01-26

## 2018-01-26 RX ORDER — ALBUTEROL SULFATE 90 UG/1
2 AEROSOL, METERED RESPIRATORY (INHALATION) EVERY 6 HOURS PRN
Qty: 1 INHALER | Refills: 3 | Status: SHIPPED | OUTPATIENT
Start: 2018-01-26 | End: 2018-10-09 | Stop reason: ALTCHOICE

## 2018-01-26 RX ORDER — TESTOSTERONE 20.25 MG/1.25G
GEL TOPICAL
Qty: 1.25 G | Refills: 5 | Status: SHIPPED | OUTPATIENT
Start: 2018-01-26 | End: 2018-10-09 | Stop reason: ALTCHOICE

## 2018-01-26 ASSESSMENT — ENCOUNTER SYMPTOMS
SHORTNESS OF BREATH: 1
COUGH: 1

## 2018-01-26 NOTE — PROGRESS NOTES
(See Comments)     hallucinations       Review of Systems   Constitutional: Positive for fatigue. Negative for chills, diaphoresis and fever. HENT: Negative for congestion. Respiratory: Positive for cough and shortness of breath. Cardiovascular: Positive for leg swelling. Negative for chest pain and palpitations. Objective  Vitals:    01/26/18 1516   BP: 120/76   Pulse: 105   Temp: 98.2 °F (36.8 °C)   TempSrc: Tympanic   SpO2: 93%   Weight: (!) 405 lb (183.7 kg)   Height: 5' 10\" (1.778 m)     Physical Exam   Constitutional: He is oriented to person, place, and time. He appears well-developed and well-nourished. No distress. HENT:   Head: Normocephalic and atraumatic. Right Ear: External ear normal.   Left Ear: External ear normal.   Cardiovascular: Normal rate, regular rhythm and normal heart sounds. Pulmonary/Chest: Effort normal and breath sounds normal.   Musculoskeletal: He exhibits edema (1+ pitting edema). Neurological: He is alert and oriented to person, place, and time. Skin: Skin is warm and dry. No rash noted. He is not diaphoretic. No erythema. No pallor. Psychiatric: He has a normal mood and affect. His behavior is normal. Judgment and thought content normal.     Assessment & Plan    1. Shortness of breath  albuterol sulfate HFA (VENTOLIN HFA) 108 (90 Base) MCG/ACT inhaler    FULL PFT STUDY WITH PRE AND POST    Echocardiogram complete   2. Hypogonadism in male  Testosterone (ANDROGEL) 20.25 MG/1.25GM (1.62%) GEL   3. Acute bronchitis, unspecified organism  albuterol sulfate HFA (VENTOLIN HFA) 108 (90 Base) MCG/ACT inhaler   4. EVELYN on CPAP     5. Essential hypertension  Basic Metabolic Panel   6. Hyperlipidemia, unspecified hyperlipidemia type  Lipid Panel   7. Uncontrolled type 2 diabetes mellitus without complication, without long-term current use of insulin (HCC)  Hemoglobin A1C   8. Obesity, morbid, BMI 50 or higher ([facility]     9.  Localized edema  Echocardiogram complete Continue albuterol and lasix. Echo and PFTs as ordered. Blood work on Monday. See cardiology Tuesday. Pt will schedule with endocrinology. Nasal saline spray prior to wearing CPAP at night. F/u in 2 weeks. Side effects, adverse effects of the medication prescribed today, as well as treatment plan/ rationale and result expectations have been discussed with the patient who expresses understanding and desires to proceed. Close follow up to evaluate treatment results and for coordination of care. I have reviewed the patient's medical history in detail and updated the computerized patient record. As always, patient is advised that if symptoms worsen in any way they will proceed to the nearest emergency room. Orders Placed This Encounter   Procedures    Hemoglobin A1C     Standing Status:   Future     Standing Expiration Date:   1/26/2019    Lipid Panel     Standing Status:   Future     Standing Expiration Date:   1/26/2019     Order Specific Question:   Is Patient Fasting?/# of Hours     Answer:   y/12    Basic Metabolic Panel     Standing Status:   Future     Standing Expiration Date:   1/26/2019    Echocardiogram complete     Standing Status:   Future     Standing Expiration Date:   3/27/2018     Order Specific Question:   Reason for exam:     Answer:   bilateral lower ext edema, shortness of breath    FULL PFT STUDY WITH PRE AND POST     Standing Status:   Future     Standing Expiration Date:   1/26/2019       Orders Placed This Encounter   Medications    Testosterone (ANDROGEL) 20.25 MG/1.25GM (1.62%) GEL     Sig: Apply 20.25 mg daily as directed. .     Dispense:  1.25 g     Refill:  5    albuterol sulfate HFA (VENTOLIN HFA) 108 (90 Base) MCG/ACT inhaler     Sig: Inhale 2 puffs into the lungs every 6 hours as needed for Wheezing     Dispense:  1 Inhaler     Refill:  3       Return in about 2 weeks (around 2/9/2018).     Clement Pulliam, CNP

## 2018-01-30 ENCOUNTER — OFFICE VISIT (OUTPATIENT)
Dept: CARDIOLOGY CLINIC | Age: 58
End: 2018-01-30
Payer: COMMERCIAL

## 2018-01-30 VITALS
DIASTOLIC BLOOD PRESSURE: 72 MMHG | RESPIRATION RATE: 22 BRPM | HEIGHT: 70 IN | HEART RATE: 106 BPM | WEIGHT: 315 LBS | SYSTOLIC BLOOD PRESSURE: 128 MMHG | OXYGEN SATURATION: 92 % | BODY MASS INDEX: 45.1 KG/M2 | TEMPERATURE: 97.1 F

## 2018-01-30 DIAGNOSIS — R06.02 SOB (SHORTNESS OF BREATH): Primary | ICD-10-CM

## 2018-01-30 DIAGNOSIS — I10 ESSENTIAL HYPERTENSION: ICD-10-CM

## 2018-01-30 DIAGNOSIS — R06.02 SHORTNESS OF BREATH: ICD-10-CM

## 2018-01-30 DIAGNOSIS — E78.5 HYPERLIPIDEMIA, UNSPECIFIED HYPERLIPIDEMIA TYPE: ICD-10-CM

## 2018-01-30 LAB
ANION GAP SERPL CALCULATED.3IONS-SCNC: 18 MEQ/L (ref 7–13)
BUN BLDV-MCNC: 38 MG/DL (ref 6–20)
CALCIUM SERPL-MCNC: 9.5 MG/DL (ref 8.6–10.2)
CHLORIDE BLD-SCNC: 100 MEQ/L (ref 98–107)
CHOLESTEROL, TOTAL: 233 MG/DL (ref 0–199)
CO2: 29 MEQ/L (ref 22–29)
CREAT SERPL-MCNC: 1.16 MG/DL (ref 0.7–1.2)
D DIMER: 0.35 MG/L FEU (ref 0–0.5)
GFR AFRICAN AMERICAN: >60
GFR NON-AFRICAN AMERICAN: >60
GLUCOSE BLD-MCNC: 197 MG/DL (ref 74–109)
HBA1C MFR BLD: 9.2 % (ref 4.8–5.9)
HDLC SERPL-MCNC: 38 MG/DL (ref 40–59)
LDL CHOLESTEROL CALCULATED: 175 MG/DL (ref 0–129)
POTASSIUM SERPL-SCNC: 4.7 MEQ/L (ref 3.5–5.1)
SODIUM BLD-SCNC: 147 MEQ/L (ref 132–144)
TRIGL SERPL-MCNC: 102 MG/DL (ref 0–200)

## 2018-01-30 PROCEDURE — 99242 OFF/OP CONSLTJ NEW/EST SF 20: CPT | Performed by: INTERNAL MEDICINE

## 2018-01-30 RX ORDER — LOSARTAN POTASSIUM AND HYDROCHLOROTHIAZIDE 12.5; 1 MG/1; MG/1
1 TABLET ORAL DAILY
Qty: 30 TABLET | Refills: 3 | Status: SHIPPED | OUTPATIENT
Start: 2018-01-30 | End: 2018-03-13 | Stop reason: SDUPTHER

## 2018-01-30 RX ORDER — METOPROLOL SUCCINATE 50 MG/1
50 TABLET, EXTENDED RELEASE ORAL NIGHTLY
Qty: 30 TABLET | Refills: 3 | Status: SHIPPED | OUTPATIENT
Start: 2018-01-30 | End: 2018-03-13 | Stop reason: SDUPTHER

## 2018-01-30 RX ORDER — ATORVASTATIN CALCIUM 20 MG/1
20 TABLET, FILM COATED ORAL NIGHTLY
Qty: 30 TABLET | Refills: 3 | Status: SHIPPED | OUTPATIENT
Start: 2018-01-30 | End: 2018-03-13 | Stop reason: SDUPTHER

## 2018-01-30 ASSESSMENT — ENCOUNTER SYMPTOMS
NAUSEA: 0
VOMITING: 0
CHEST TIGHTNESS: 0
SHORTNESS OF BREATH: 1

## 2018-01-30 NOTE — PROGRESS NOTES
losartan-hydrochlorothiazide (HYZAAR) 100-12.5 MG per tablet, Take 1 tablet by mouth daily, Disp: 30 tablet, Rfl: 3    atorvastatin (LIPITOR) 20 MG tablet, Take 1 tablet by mouth nightly, Disp: 30 tablet, Rfl: 3    metoprolol succinate (TOPROL XL) 50 MG extended release tablet, Take 1 tablet by mouth nightly, Disp: 30 tablet, Rfl: 3    Testosterone (ANDROGEL) 20.25 MG/1.25GM (1.62%) GEL, Apply 20.25 mg daily as directed. ., Disp: 1.25 g, Rfl: 5    albuterol sulfate HFA (VENTOLIN HFA) 108 (90 Base) MCG/ACT inhaler, Inhale 2 puffs into the lungs every 6 hours as needed for Wheezing, Disp: 1 Inhaler, Rfl: 3    furosemide (LASIX) 20 MG tablet, Take 1 tablet by mouth daily, Disp: 30 tablet, Rfl: 0    omeprazole (PRILOSEC) 20 MG delayed release capsule, TAKE 1 CAPSULE BY MOUTH DAILY, Disp: 90 capsule, Rfl: 1    Insulin Pen Needle (BD PEN NEEDLE MELINDA U/F) 32G X 4 MM MISC, USE AS DIRECTED BY PHYSICIAN, Disp: 270 each, Rfl: 1    cetirizine (ZYRTEC) 10 MG tablet, Take 1 tablet by mouth daily, Disp: 30 tablet, Rfl: 0    fluticasone (FLONASE) 50 MCG/ACT nasal spray, 2 sprays by Nasal route daily, Disp: 1 Bottle, Rfl: 0    metFORMIN (GLUCOPHAGE) 1000 MG tablet, TAKE 1 TABLET BY MOUTH TWICE DAILY, Disp: 180 tablet, Rfl: 1    insulin glargine (LANTUS SOLOSTAR) 100 UNIT/ML injection pen, 80 units bid, Disp: 90 Pen, Rfl: 3    insulin aspart protamine-insulin aspart (NOVOLOG MIX 70/30 FLEXPEN) (70-30) 100 UNIT/ML injection, 100 units twice a day, Disp: 180 Pen, Rfl: 2    glucose blood VI test strips (ONE TOUCH ULTRA TEST) strip, Test tid, Disp: 300 each, Rfl: 3    aspirin 81 MG EC tablet, Take 81 mg by mouth daily. , Disp: , Rfl:       Review of Systems:  Review of Systems   Constitutional: Positive for fatigue. Negative for diaphoresis. Respiratory: Positive for shortness of breath (Some). Negative for chest tightness. Cardiovascular: Positive for leg swelling. Negative for chest pain and palpitations.

## 2018-02-09 ENCOUNTER — OFFICE VISIT (OUTPATIENT)
Dept: FAMILY MEDICINE CLINIC | Age: 58
End: 2018-02-09
Payer: COMMERCIAL

## 2018-02-09 VITALS
HEIGHT: 70 IN | WEIGHT: 315 LBS | HEART RATE: 96 BPM | SYSTOLIC BLOOD PRESSURE: 128 MMHG | OXYGEN SATURATION: 95 % | BODY MASS INDEX: 45.1 KG/M2 | TEMPERATURE: 97.5 F | DIASTOLIC BLOOD PRESSURE: 80 MMHG

## 2018-02-09 DIAGNOSIS — Z99.89 OSA ON CPAP: ICD-10-CM

## 2018-02-09 DIAGNOSIS — E66.01 OBESITY, MORBID, BMI 50 OR HIGHER (HCC): Primary | ICD-10-CM

## 2018-02-09 DIAGNOSIS — G47.33 OSA ON CPAP: ICD-10-CM

## 2018-02-09 DIAGNOSIS — R05.3 CHRONIC COUGH: ICD-10-CM

## 2018-02-09 PROCEDURE — 99213 OFFICE O/P EST LOW 20 MIN: CPT | Performed by: NURSE PRACTITIONER

## 2018-02-09 ASSESSMENT — ENCOUNTER SYMPTOMS
SHORTNESS OF BREATH: 1
COUGH: 1

## 2018-02-09 NOTE — PROGRESS NOTES
Subjective  Chief Complaint   Patient presents with    Cough     still coughing       Cough   This is a new problem. The current episode started more than 1 month ago. The problem has been unchanged. The problem occurs every few minutes. Associated symptoms include shortness of breath. Pertinent negatives include no chest pain, chills or fever. Associated symptoms comments: SOB. Nothing aggravates the symptoms. He has tried oral steroids, steroid inhaler and OTC cough suppressant for the symptoms. The treatment provided mild relief. Pt did see Dr. Eliceo Hedrick who stopped his lisinopril and ordered an echo and a stress test. Pt states he never received a call for the echo, stress test, or PFTs that I ordered so he has not had any of them done. Has not yet scheduled with endocrinology.     Patient Active Problem List    Diagnosis Date Noted    ED (erectile dysfunction)     GERD (gastroesophageal reflux disease)     EVELYN on CPAP     Obesity, morbid, BMI 50 or higher ([address]     Hypogonadism in male 04/01/2017    Diabetes mellitus out of control ([address] 09/17/2011    Hypertension 09/17/2011    Hyperlipidemia 09/17/2011    Vitamin D deficiency 10/17/2008     Past Medical History:   Diagnosis Date    ED (erectile dysfunction)     GERD (gastroesophageal reflux disease)     History of cardiovascular stress test     Normal; Saw Dr. Kuldeep Rowley History of EKG 10/29/14    Hyperlipidemia     Hypertension     Hypogonadism in male 04/2017    Obesity, morbid, BMI 50 or higher ([address]     EVELYN on CPAP     Type II or unspecified type diabetes mellitus without mention of complication, not stated as uncontrolled     Vitamin D deficiency 10/17/2008     Past Surgical History:   Procedure Laterality Date    COLONOSCOPY  8/22/13    TONSILLECTOMY      TOOTH EXTRACTION Left 2014    left lower widom tooth extraction     Family History   Problem Relation Age of Onset    Heart Disease Father      Social History     Social History  Marital status:      Spouse name: N/A    Number of children: 0    Years of education: N/A     Social History Main Topics    Smoking status: Never Smoker    Smokeless tobacco: Never Used    Alcohol use Yes      Comment: rare    Drug use: No    Sexual activity: Yes     Partners: Female     Other Topics Concern    None     Social History Narrative    None     Current Outpatient Prescriptions on File Prior to Visit   Medication Sig Dispense Refill    atorvastatin (LIPITOR) 20 MG tablet Take 1 tablet by mouth nightly 30 tablet 3    metoprolol succinate (TOPROL XL) 50 MG extended release tablet Take 1 tablet by mouth nightly 30 tablet 3    Testosterone (ANDROGEL) 20.25 MG/1.25GM (1.62%) GEL Apply 20.25 mg daily as directed. Christie Riedel 1.25 g 5    albuterol sulfate HFA (VENTOLIN HFA) 108 (90 Base) MCG/ACT inhaler Inhale 2 puffs into the lungs every 6 hours as needed for Wheezing 1 Inhaler 3    omeprazole (PRILOSEC) 20 MG delayed release capsule TAKE 1 CAPSULE BY MOUTH DAILY 90 capsule 1    Insulin Pen Needle (BD PEN NEEDLE MELINDA U/F) 32G X 4 MM MISC USE AS DIRECTED BY PHYSICIAN 270 each 1    cetirizine (ZYRTEC) 10 MG tablet Take 1 tablet by mouth daily 30 tablet 0    fluticasone (FLONASE) 50 MCG/ACT nasal spray 2 sprays by Nasal route daily 1 Bottle 0    metFORMIN (GLUCOPHAGE) 1000 MG tablet TAKE 1 TABLET BY MOUTH TWICE DAILY 180 tablet 1    insulin glargine (LANTUS SOLOSTAR) 100 UNIT/ML injection pen 80 units bid 90 Pen 3    insulin aspart protamine-insulin aspart (NOVOLOG MIX 70/30 FLEXPEN) (70-30) 100 UNIT/ML injection 100 units twice a day 180 Pen 2    glucose blood VI test strips (ONE TOUCH ULTRA TEST) strip Test tid 300 each 3    aspirin 81 MG EC tablet Take 81 mg by mouth daily.         losartan-hydrochlorothiazide (HYZAAR) 100-12.5 MG per tablet Take 1 tablet by mouth daily 30 tablet 3    furosemide (LASIX) 20 MG tablet Take 1 tablet by mouth daily 30 tablet 0     No current facility-administered medications on file prior to visit. Allergies   Allergen Reactions    Tessalon Perles [Benzonatate] Other (See Comments)     hallucinations       Review of Systems   Constitutional: Negative for chills, diaphoresis, fatigue and fever. Respiratory: Positive for cough and shortness of breath. Cardiovascular: Negative for chest pain, palpitations and leg swelling. Objective  Vitals:    02/09/18 1505   BP: 128/80   Pulse: 96   Temp: 97.5 °F (36.4 °C)   TempSrc: Tympanic   SpO2: 95%   Weight: (!) 402 lb (182.3 kg)   Height: 5' 10\" (1.778 m)     Physical Exam   Constitutional: He is oriented to person, place, and time. He appears well-developed and well-nourished. No distress. Cardiovascular: Normal rate, regular rhythm and normal heart sounds. Pulmonary/Chest: Effort normal and breath sounds normal. No respiratory distress. Neurological: He is alert and oriented to person, place, and time. Skin: Skin is warm and dry. No rash noted. He is not diaphoretic. No erythema. No pallor. Psychiatric: He has a normal mood and affect. His behavior is normal. Judgment and thought content normal.     Assessment & Plan    1. Obesity, morbid, BMI 50 or higher [facility])  Yoanna Hastings MD   2. EVELYN on CPAP  Yoanna Hastings MD   3. Chronic cough  Yoanna Hastings MD     Referral to pulmonology as ordered. Re-faxed all ordered to central scheduling per [place]. Dr. Joel Boas office notified that the tests were not done. Pt will schedule with endocrinology as well. F/u after testing. Side effects, adverse effects of the medication prescribed today, as well as treatment plan/ rationale and result expectations have been discussed with the patient who expresses understanding and desires to proceed. Close follow up to evaluate treatment results and for coordination of care.   I have reviewed the patient's

## 2018-03-01 ENCOUNTER — HOSPITAL ENCOUNTER (OUTPATIENT)
Dept: NUCLEAR MEDICINE | Age: 58
Discharge: HOME | End: 2018-03-03
Payer: COMMERCIAL

## 2018-03-01 ENCOUNTER — HOSPITAL ENCOUNTER (OUTPATIENT)
Dept: NON INVASIVE DIAGNOSTICS | Age: 58
Discharge: HOME | End: 2018-03-01
Payer: COMMERCIAL

## 2018-03-01 DIAGNOSIS — I10 ESSENTIAL HYPERTENSION: ICD-10-CM

## 2018-03-01 DIAGNOSIS — R06.02 SOB (SHORTNESS OF BREATH): ICD-10-CM

## 2018-03-01 LAB
LV EF: 60 %
LVEF MODALITY: NORMAL

## 2018-03-01 PROCEDURE — 2580000003 HC RX 258: Performed by: INTERNAL MEDICINE

## 2018-03-01 PROCEDURE — 3430000000 HC RX DIAGNOSTIC RADIOPHARMACEUTICAL: Performed by: INTERNAL MEDICINE

## 2018-03-01 PROCEDURE — 93017 CV STRESS TEST TRACING ONLY: CPT

## 2018-03-01 PROCEDURE — C8929 TTE W OR WO FOL WCON,DOPPLER: HCPCS

## 2018-03-01 PROCEDURE — 78452 HT MUSCLE IMAGE SPECT MULT: CPT

## 2018-03-01 PROCEDURE — A9502 TC99M TETROFOSMIN: HCPCS | Performed by: INTERNAL MEDICINE

## 2018-03-01 PROCEDURE — 6360000002 HC RX W HCPCS: Performed by: INTERNAL MEDICINE

## 2018-03-01 RX ORDER — SODIUM CHLORIDE 0.9 % (FLUSH) 0.9 %
10 SYRINGE (ML) INJECTION PRN
Status: DISCONTINUED | OUTPATIENT
Start: 2018-03-01 | End: 2018-03-04 | Stop reason: HOSPADM

## 2018-03-01 RX ADMIN — TETROFOSMIN 33.1 MILLICURIE: 0.23 INJECTION, POWDER, LYOPHILIZED, FOR SOLUTION INTRAVENOUS at 09:22

## 2018-03-01 RX ADMIN — Medication 20 ML: at 09:22

## 2018-03-01 RX ADMIN — REGADENOSON 0.4 MG: 0.08 INJECTION, SOLUTION INTRAVENOUS at 09:22

## 2018-03-02 PROCEDURE — 3430000000 HC RX DIAGNOSTIC RADIOPHARMACEUTICAL: Performed by: INTERNAL MEDICINE

## 2018-03-02 PROCEDURE — A9502 TC99M TETROFOSMIN: HCPCS | Performed by: INTERNAL MEDICINE

## 2018-03-02 RX ADMIN — TETROFOSMIN 35.8 MILLICURIE: 0.23 INJECTION, POWDER, LYOPHILIZED, FOR SOLUTION INTRAVENOUS at 07:40

## 2018-03-03 PROCEDURE — 78452 HT MUSCLE IMAGE SPECT MULT: CPT | Performed by: INTERNAL MEDICINE

## 2018-03-03 PROCEDURE — 93016 CV STRESS TEST SUPVJ ONLY: CPT | Performed by: INTERNAL MEDICINE

## 2018-03-13 ENCOUNTER — OFFICE VISIT (OUTPATIENT)
Dept: CARDIOLOGY CLINIC | Age: 58
End: 2018-03-13
Payer: COMMERCIAL

## 2018-03-13 VITALS
WEIGHT: 315 LBS | SYSTOLIC BLOOD PRESSURE: 122 MMHG | BODY MASS INDEX: 58.54 KG/M2 | DIASTOLIC BLOOD PRESSURE: 72 MMHG | RESPIRATION RATE: 20 BRPM | HEART RATE: 94 BPM | OXYGEN SATURATION: 94 %

## 2018-03-13 DIAGNOSIS — Z99.89 OSA ON CPAP: ICD-10-CM

## 2018-03-13 DIAGNOSIS — E66.01 OBESITY, MORBID, BMI 50 OR HIGHER (HCC): ICD-10-CM

## 2018-03-13 DIAGNOSIS — G47.33 OSA ON CPAP: ICD-10-CM

## 2018-03-13 DIAGNOSIS — I10 ESSENTIAL HYPERTENSION: Primary | ICD-10-CM

## 2018-03-13 PROCEDURE — 99213 OFFICE O/P EST LOW 20 MIN: CPT | Performed by: INTERNAL MEDICINE

## 2018-03-13 RX ORDER — METOPROLOL SUCCINATE 50 MG/1
50 TABLET, EXTENDED RELEASE ORAL NIGHTLY
Qty: 90 TABLET | Refills: 3 | Status: SHIPPED | OUTPATIENT
Start: 2018-03-13 | End: 2018-03-14 | Stop reason: SDUPTHER

## 2018-03-13 RX ORDER — ATORVASTATIN CALCIUM 20 MG/1
20 TABLET, FILM COATED ORAL NIGHTLY
Qty: 90 TABLET | Refills: 3 | Status: SHIPPED | OUTPATIENT
Start: 2018-03-13 | End: 2018-03-14 | Stop reason: SDUPTHER

## 2018-03-13 RX ORDER — ATORVASTATIN CALCIUM 20 MG/1
20 TABLET, FILM COATED ORAL NIGHTLY
Qty: 90 TABLET | Refills: 3 | Status: SHIPPED | OUTPATIENT
Start: 2018-03-13 | End: 2018-03-13 | Stop reason: SDUPTHER

## 2018-03-13 RX ORDER — LOSARTAN POTASSIUM AND HYDROCHLOROTHIAZIDE 12.5; 1 MG/1; MG/1
1 TABLET ORAL DAILY
Qty: 90 TABLET | Refills: 3 | Status: SHIPPED | OUTPATIENT
Start: 2018-03-13 | End: 2018-03-13 | Stop reason: SDUPTHER

## 2018-03-13 RX ORDER — METOPROLOL SUCCINATE 50 MG/1
50 TABLET, EXTENDED RELEASE ORAL NIGHTLY
Qty: 90 TABLET | Refills: 3 | Status: SHIPPED | OUTPATIENT
Start: 2018-03-13 | End: 2018-03-13 | Stop reason: SDUPTHER

## 2018-03-13 RX ORDER — LOSARTAN POTASSIUM AND HYDROCHLOROTHIAZIDE 12.5; 1 MG/1; MG/1
1 TABLET ORAL DAILY
Qty: 90 TABLET | Refills: 3 | Status: SHIPPED | OUTPATIENT
Start: 2018-03-13 | End: 2018-03-14 | Stop reason: SDUPTHER

## 2018-03-13 ASSESSMENT — ENCOUNTER SYMPTOMS
SHORTNESS OF BREATH: 1
APNEA: 0
CHEST TIGHTNESS: 0

## 2018-03-13 NOTE — PROGRESS NOTES
Reason For Visit:  Chief Complaint   Patient presents with    Results     STRESS/ECHO    Hypertension       HPI:  3/13/2018: Patient presents today for Follow-up of stress test was negative for ischemia. Echocardiogram showed normal LV ejection fraction. Mild left atrial enlargement. He is morbidly obese. He has diabetes. He owns his own business. He would be a good candidate for bariatric surgery. See me in 6 months    1/30/18: Patient presents today for evaluation of shortness of breath. He owns a  private business. He is morbidly,obese has diabetes. . Lives in [place]. Consulted by [facility] . Has seen Dr. Merilyn Brittle in the remote past. No definite syncope. Has minimal ankle edema. Multiple risks for coronary artery disease. Needs a [place]. and an echocardiogram. See me afterwards. Problem List:  Patient Active Problem List   Diagnosis    Diabetes mellitus out of control ([address]    Hypertension    Hyperlipidemia    Vitamin D deficiency    GERD (gastroesophageal reflux disease)    EVELYN on CPAP    Obesity, morbid, BMI 50 or higher ([address]    Hypogonadism in male    ED (erectile dysfunction)       Allergies: Allergies   Allergen Reactions    Tessalon Perles [Benzonatate] Other (See Comments)     hallucinations       Personal History:   has a past medical history of ED (erectile dysfunction); GERD (gastroesophageal reflux disease); History of cardiovascular stress test; History of EKG; Hyperlipidemia; Hypertension; Hypogonadism in male; Obesity, morbid, BMI 50 or higher ([address] EVELYN on CPAP; Type II or unspecified type diabetes mellitus without mention of complication, not stated as uncontrolled; and Vitamin D deficiency. Social History:   reports that he has never smoked. He has never used smokeless tobacco. He reports that he drinks alcohol. He reports that he does not use drugs.     Surgical History:  Past Surgical History:   Procedure Laterality Date    COLONOSCOPY  8/22/13   Lindsay Mackey TONSILLECTOMY      TOOTH EXTRACTION Left 2014    left lower widom tooth extraction       Family History:  family history includes Heart Disease in his father. Current Medications:    Current Outpatient Prescriptions:     losartan-hydrochlorothiazide (HYZAAR) 100-12.5 MG per tablet, Take 1 tablet by mouth daily, Disp: 90 tablet, Rfl: 3    atorvastatin (LIPITOR) 20 MG tablet, Take 1 tablet by mouth nightly, Disp: 90 tablet, Rfl: 3    metoprolol succinate (TOPROL XL) 50 MG extended release tablet, Take 1 tablet by mouth nightly, Disp: 90 tablet, Rfl: 3    albuterol sulfate HFA (VENTOLIN HFA) 108 (90 Base) MCG/ACT inhaler, Inhale 2 puffs into the lungs every 6 hours as needed for Wheezing, Disp: 1 Inhaler, Rfl: 3    furosemide (LASIX) 20 MG tablet, Take 1 tablet by mouth daily, Disp: 30 tablet, Rfl: 0    omeprazole (PRILOSEC) 20 MG delayed release capsule, TAKE 1 CAPSULE BY MOUTH DAILY, Disp: 90 capsule, Rfl: 1    Insulin Pen Needle (BD PEN NEEDLE MLEINDA U/F) 32G X 4 MM MISC, USE AS DIRECTED BY PHYSICIAN, Disp: 270 each, Rfl: 1    cetirizine (ZYRTEC) 10 MG tablet, Take 1 tablet by mouth daily, Disp: 30 tablet, Rfl: 0    fluticasone (FLONASE) 50 MCG/ACT nasal spray, 2 sprays by Nasal route daily, Disp: 1 Bottle, Rfl: 0    metFORMIN (GLUCOPHAGE) 1000 MG tablet, TAKE 1 TABLET BY MOUTH TWICE DAILY, Disp: 180 tablet, Rfl: 1    insulin glargine (LANTUS SOLOSTAR) 100 UNIT/ML injection pen, 80 units bid, Disp: 90 Pen, Rfl: 3    insulin aspart protamine-insulin aspart (NOVOLOG MIX 70/30 FLEXPEN) (70-30) 100 UNIT/ML injection, 100 units twice a day, Disp: 180 Pen, Rfl: 2    glucose blood VI test strips (ONE TOUCH ULTRA TEST) strip, Test tid, Disp: 300 each, Rfl: 3    aspirin 81 MG EC tablet, Take 81 mg by mouth daily. , Disp: , Rfl:     Testosterone (ANDROGEL) 20.25 MG/1.25GM (1.62%) GEL, Apply 20.25 mg daily as directed. ., Disp: 1.25 g, Rfl: 5      Review of Systems:  Review of Systems   Constitutional:

## 2018-03-14 RX ORDER — METOPROLOL SUCCINATE 50 MG/1
50 TABLET, EXTENDED RELEASE ORAL NIGHTLY
Qty: 90 TABLET | Refills: 3 | Status: SHIPPED | OUTPATIENT
Start: 2018-03-14 | End: 2018-10-09 | Stop reason: ALTCHOICE

## 2018-03-14 RX ORDER — ATORVASTATIN CALCIUM 20 MG/1
20 TABLET, FILM COATED ORAL NIGHTLY
Qty: 90 TABLET | Refills: 3 | Status: SHIPPED | OUTPATIENT
Start: 2018-03-14 | End: 2018-10-09 | Stop reason: ALTCHOICE

## 2018-03-14 RX ORDER — LOSARTAN POTASSIUM AND HYDROCHLOROTHIAZIDE 12.5; 1 MG/1; MG/1
1 TABLET ORAL DAILY
Qty: 90 TABLET | Refills: 3 | Status: SHIPPED | OUTPATIENT
Start: 2018-03-14 | End: 2018-07-30 | Stop reason: SDUPTHER

## 2018-03-26 RX ORDER — INSULIN LISPRO 100 [IU]/ML
INJECTION, SUSPENSION SUBCUTANEOUS
Qty: 12 PEN | Refills: 1 | Status: SHIPPED | OUTPATIENT
Start: 2018-03-26 | End: 2018-10-09 | Stop reason: ALTCHOICE

## 2018-03-26 RX ORDER — INSULIN GLARGINE 100 [IU]/ML
INJECTION, SOLUTION SUBCUTANEOUS
Qty: 5 PEN | Refills: 3 | Status: SHIPPED | OUTPATIENT
Start: 2018-03-26 | End: 2018-10-09 | Stop reason: ALTCHOICE

## 2018-04-04 ENCOUNTER — OFFICE VISIT (OUTPATIENT)
Dept: PULMONOLOGY | Age: 58
End: 2018-04-04
Payer: COMMERCIAL

## 2018-04-04 VITALS
OXYGEN SATURATION: 92 % | RESPIRATION RATE: 16 BRPM | SYSTOLIC BLOOD PRESSURE: 138 MMHG | HEART RATE: 88 BPM | DIASTOLIC BLOOD PRESSURE: 76 MMHG | TEMPERATURE: 96.5 F | BODY MASS INDEX: 58.97 KG/M2 | WEIGHT: 315 LBS

## 2018-04-04 DIAGNOSIS — R06.2 WHEEZING: ICD-10-CM

## 2018-04-04 DIAGNOSIS — Z99.89 OSA ON CPAP: ICD-10-CM

## 2018-04-04 DIAGNOSIS — R05.9 COUGH: Primary | ICD-10-CM

## 2018-04-04 DIAGNOSIS — G47.33 OSA ON CPAP: ICD-10-CM

## 2018-04-04 DIAGNOSIS — R09.81 NASAL CONGESTION: ICD-10-CM

## 2018-04-04 DIAGNOSIS — K21.9 GASTROESOPHAGEAL REFLUX DISEASE, ESOPHAGITIS PRESENCE NOT SPECIFIED: ICD-10-CM

## 2018-04-04 DIAGNOSIS — E66.01 OBESITY, MORBID, BMI 50 OR HIGHER (HCC): ICD-10-CM

## 2018-04-04 PROCEDURE — 99204 OFFICE O/P NEW MOD 45 MIN: CPT | Performed by: INTERNAL MEDICINE

## 2018-04-04 RX ORDER — FLUTICASONE FUROATE AND VILANTEROL 100; 25 UG/1; UG/1
1 POWDER RESPIRATORY (INHALATION) DAILY
Qty: 1 EACH | Refills: 0 | COMMUNITY
Start: 2018-04-04 | End: 2018-07-10 | Stop reason: ALTCHOICE

## 2018-04-04 RX ORDER — IPRATROPIUM BROMIDE 42 UG/1
2 SPRAY, METERED NASAL 3 TIMES DAILY
Qty: 1 BOTTLE | Refills: 3 | Status: SHIPPED | OUTPATIENT
Start: 2018-04-04 | End: 2018-10-09 | Stop reason: ALTCHOICE

## 2018-04-04 RX ORDER — FLUTICASONE PROPIONATE 50 MCG
2 SPRAY, SUSPENSION (ML) NASAL 2 TIMES DAILY
Qty: 1 BOTTLE | Refills: 2 | Status: SHIPPED | OUTPATIENT
Start: 2018-04-04 | End: 2018-10-09 | Stop reason: ALTCHOICE

## 2018-05-11 ENCOUNTER — HOSPITAL ENCOUNTER (OUTPATIENT)
Dept: PULMONOLOGY | Age: 58
Discharge: HOME | End: 2018-05-11
Payer: COMMERCIAL

## 2018-05-11 DIAGNOSIS — R06.02 SHORTNESS OF BREATH: ICD-10-CM

## 2018-05-11 PROCEDURE — 94726 PLETHYSMOGRAPHY LUNG VOLUMES: CPT

## 2018-05-11 PROCEDURE — 94060 EVALUATION OF WHEEZING: CPT | Performed by: INTERNAL MEDICINE

## 2018-05-11 PROCEDURE — 6360000002 HC RX W HCPCS: Performed by: NURSE PRACTITIONER

## 2018-05-11 PROCEDURE — 94060 EVALUATION OF WHEEZING: CPT

## 2018-05-11 PROCEDURE — 94729 DIFFUSING CAPACITY: CPT | Performed by: INTERNAL MEDICINE

## 2018-05-11 PROCEDURE — 94729 DIFFUSING CAPACITY: CPT

## 2018-05-11 PROCEDURE — 94726 PLETHYSMOGRAPHY LUNG VOLUMES: CPT | Performed by: INTERNAL MEDICINE

## 2018-05-11 RX ORDER — ALBUTEROL SULFATE 2.5 MG/3ML
2.5 SOLUTION RESPIRATORY (INHALATION) ONCE
Status: COMPLETED | OUTPATIENT
Start: 2018-05-11 | End: 2018-05-11

## 2018-05-11 RX ADMIN — ALBUTEROL SULFATE 2.5 MG: 2.5 SOLUTION RESPIRATORY (INHALATION) at 09:49

## 2018-06-12 ENCOUNTER — OFFICE VISIT (OUTPATIENT)
Dept: CARDIOLOGY CLINIC | Age: 58
End: 2018-06-12
Payer: COMMERCIAL

## 2018-06-12 VITALS
WEIGHT: 315 LBS | BODY MASS INDEX: 45.1 KG/M2 | HEART RATE: 85 BPM | HEIGHT: 70 IN | DIASTOLIC BLOOD PRESSURE: 70 MMHG | RESPIRATION RATE: 12 BRPM | SYSTOLIC BLOOD PRESSURE: 120 MMHG

## 2018-06-12 DIAGNOSIS — Z01.810 PREOP CARDIOVASCULAR EXAM: ICD-10-CM

## 2018-06-12 DIAGNOSIS — R06.02 SOB (SHORTNESS OF BREATH): ICD-10-CM

## 2018-06-12 DIAGNOSIS — I10 ESSENTIAL HYPERTENSION: Primary | ICD-10-CM

## 2018-06-12 PROCEDURE — 99214 OFFICE O/P EST MOD 30 MIN: CPT | Performed by: INTERNAL MEDICINE

## 2018-06-12 ASSESSMENT — ENCOUNTER SYMPTOMS
BLOOD IN STOOL: 0
DIARRHEA: 0
VOMITING: 0
COLOR CHANGE: 0
COUGH: 0
ABDOMINAL DISTENTION: 0
ANAL BLEEDING: 0
APNEA: 0
ABDOMINAL PAIN: 0
SHORTNESS OF BREATH: 1
CHEST TIGHTNESS: 0
NAUSEA: 0

## 2018-07-10 ENCOUNTER — OFFICE VISIT (OUTPATIENT)
Dept: PULMONOLOGY | Age: 58
End: 2018-07-10
Payer: COMMERCIAL

## 2018-07-10 VITALS
DIASTOLIC BLOOD PRESSURE: 64 MMHG | TEMPERATURE: 98.4 F | BODY MASS INDEX: 45.1 KG/M2 | HEIGHT: 70 IN | HEART RATE: 105 BPM | SYSTOLIC BLOOD PRESSURE: 108 MMHG | OXYGEN SATURATION: 94 % | WEIGHT: 315 LBS

## 2018-07-10 DIAGNOSIS — R06.09 DOE (DYSPNEA ON EXERTION): Primary | ICD-10-CM

## 2018-07-10 DIAGNOSIS — E66.01 OBESITY, MORBID, BMI 50 OR HIGHER (HCC): ICD-10-CM

## 2018-07-10 DIAGNOSIS — G47.33 OSA ON CPAP: ICD-10-CM

## 2018-07-10 DIAGNOSIS — R09.81 NASAL CONGESTION: ICD-10-CM

## 2018-07-10 DIAGNOSIS — K21.9 GASTROESOPHAGEAL REFLUX DISEASE, ESOPHAGITIS PRESENCE NOT SPECIFIED: ICD-10-CM

## 2018-07-10 DIAGNOSIS — Z99.89 OSA ON CPAP: ICD-10-CM

## 2018-07-10 PROCEDURE — 99214 OFFICE O/P EST MOD 30 MIN: CPT | Performed by: INTERNAL MEDICINE

## 2018-07-10 RX ORDER — CHOLECALCIFEROL (VITAMIN D3) 1250 MCG
CAPSULE ORAL
Refills: 0 | COMMUNITY
Start: 2018-06-21 | End: 2018-10-09 | Stop reason: ALTCHOICE

## 2018-07-10 RX ORDER — FLUTICASONE FUROATE AND VILANTEROL 200; 25 UG/1; UG/1
1 POWDER RESPIRATORY (INHALATION) DAILY
Qty: 1 EACH | Refills: 3 | Status: SHIPPED | OUTPATIENT
Start: 2018-07-10 | End: 2018-10-09 | Stop reason: ALTCHOICE

## 2018-07-10 NOTE — PROGRESS NOTES
Subjective:     Benjy Butler is a 62 y.o. male who complains today of:     Chief Complaint   Patient presents with    Follow-up     PFT       HPI  Patient presents for SOB follow up was started on Breo last visit, continue to have SOB with activity, walking 100 yards makes him very SOB, he climb steps slowly du eto knee pain so no significant issues with that, no chest pain, mild dry cough mostly at evening time. And sometimes during the day depending on allergy exposure. + sinus / allergy symptoms, he could not wean himself off Afrin. Flonase and Atrovent did not help. Has CPAP at home did not use in a year due to claustrophobia    Never smoked   No FH of lung disease            Allergies:  Tessalon perles [benzonatate]  Past Medical History:   Diagnosis Date    ED (erectile dysfunction)     GERD (gastroesophageal reflux disease)     History of cardiovascular stress test     Normal; Saw Dr. Ayaka Cooper History of EKG 10/29/14    Hyperlipidemia     Hypertension     Hypogonadism in male 04/2017    Obesity, morbid, BMI 50 or higher [facility]     EVELYN on CPAP     Type II or unspecified type diabetes mellitus without mention of complication, not stated as uncontrolled     Vitamin D deficiency 10/17/2008     Past Surgical History:   Procedure Laterality Date    COLONOSCOPY  8/22/13    TONSILLECTOMY      TOOTH EXTRACTION Left 2014    left lower widom tooth extraction     Family History   Problem Relation Age of Onset    Heart Disease Father      Social History     Social History    Marital status:      Spouse name: N/A    Number of children: 0    Years of education: N/A     Occupational History    Not on file.      Social History Main Topics    Smoking status: Never Smoker    Smokeless tobacco: Never Used    Alcohol use Yes      Comment: rare    Drug use: No    Sexual activity: Yes     Partners: Female     Other Topics Concern    Not on file     Social History Narrative    No narrative on file

## 2018-07-30 RX ORDER — LOSARTAN POTASSIUM AND HYDROCHLOROTHIAZIDE 12.5; 1 MG/1; MG/1
1 TABLET ORAL DAILY
Qty: 90 TABLET | Refills: 3 | Status: SHIPPED | OUTPATIENT
Start: 2018-07-30 | End: 2018-10-09 | Stop reason: ALTCHOICE

## 2018-07-30 NOTE — TELEPHONE ENCOUNTER
From: Ashley Macias  Sent: 7/27/2018 11:29 AM EDT  Subject: Medication Renewal Request    Ashlye Gomezdy would like a refill of the following medications:     losartan-hydrochlorothiazide (HYZAAR) 100-12.5 MG per tablet Michael Cunningham MD]   Patient Comment: I need a 90 day mail away prescription to [facility] delivery    Preferred pharmacy: [address] [place] [address]. PHARM.(SPECIALTY) - [facility] - [address]

## 2018-10-03 PROBLEM — Z98.84 S/P LAPAROSCOPIC SLEEVE GASTRECTOMY: Status: ACTIVE | Noted: 2018-09-24

## 2018-10-09 ENCOUNTER — OFFICE VISIT (OUTPATIENT)
Dept: CARDIOLOGY CLINIC | Age: 58
End: 2018-10-09
Payer: COMMERCIAL

## 2018-10-09 VITALS
HEART RATE: 111 BPM | BODY MASS INDEX: 45.1 KG/M2 | TEMPERATURE: 97.2 F | OXYGEN SATURATION: 94 % | HEIGHT: 70 IN | WEIGHT: 315 LBS | DIASTOLIC BLOOD PRESSURE: 86 MMHG | RESPIRATION RATE: 22 BRPM | SYSTOLIC BLOOD PRESSURE: 124 MMHG

## 2018-10-09 DIAGNOSIS — I10 ESSENTIAL HYPERTENSION: Primary | ICD-10-CM

## 2018-10-09 DIAGNOSIS — E66.01 OBESITY, MORBID, BMI 50 OR HIGHER (HCC): ICD-10-CM

## 2018-10-09 PROCEDURE — 99213 OFFICE O/P EST LOW 20 MIN: CPT | Performed by: INTERNAL MEDICINE

## 2018-10-09 ASSESSMENT — ENCOUNTER SYMPTOMS
NAUSEA: 0
DIARRHEA: 0
SHORTNESS OF BREATH: 0
CHEST TIGHTNESS: 0
APNEA: 0
VOMITING: 0
BLOOD IN STOOL: 0

## 2018-10-10 ASSESSMENT — ENCOUNTER SYMPTOMS: COLOR CHANGE: 0

## 2018-10-15 ENCOUNTER — TELEPHONE (OUTPATIENT)
Dept: PULMONOLOGY | Age: 58
End: 2018-10-15

## 2018-10-15 NOTE — TELEPHONE ENCOUNTER
Zoltan Jolley cancelled his appointment with you for tomorrow. He said that you were suppose to refer him to an ENT.

## 2018-11-02 ENCOUNTER — OFFICE VISIT (OUTPATIENT)
Dept: FAMILY MEDICINE CLINIC | Age: 58
End: 2018-11-02
Payer: COMMERCIAL

## 2018-11-02 VITALS
WEIGHT: 315 LBS | TEMPERATURE: 96.5 F | BODY MASS INDEX: 45.1 KG/M2 | HEIGHT: 70 IN | HEART RATE: 64 BPM | OXYGEN SATURATION: 99 % | SYSTOLIC BLOOD PRESSURE: 120 MMHG | DIASTOLIC BLOOD PRESSURE: 76 MMHG

## 2018-11-02 DIAGNOSIS — E29.1 HYPOGONADISM IN MALE: ICD-10-CM

## 2018-11-02 DIAGNOSIS — I10 ESSENTIAL HYPERTENSION: ICD-10-CM

## 2018-11-02 DIAGNOSIS — E11.9 TYPE 2 DIABETES MELLITUS WITHOUT COMPLICATION, WITH LONG-TERM CURRENT USE OF INSULIN (HCC): ICD-10-CM

## 2018-11-02 DIAGNOSIS — Z12.5 PROSTATE CANCER SCREENING: ICD-10-CM

## 2018-11-02 DIAGNOSIS — Z79.4 TYPE 2 DIABETES MELLITUS WITHOUT COMPLICATION, WITH LONG-TERM CURRENT USE OF INSULIN (HCC): ICD-10-CM

## 2018-11-02 DIAGNOSIS — E66.01 OBESITY, MORBID, BMI 50 OR HIGHER (HCC): Primary | ICD-10-CM

## 2018-11-02 DIAGNOSIS — Z23 NEEDS FLU SHOT: ICD-10-CM

## 2018-11-02 DIAGNOSIS — Z11.4 ENCOUNTER FOR SCREENING FOR HIV: ICD-10-CM

## 2018-11-02 DIAGNOSIS — Z23 NEED FOR TDAP VACCINATION: ICD-10-CM

## 2018-11-02 LAB
BASOPHILS ABSOLUTE: 0 K/UL (ref 0–0.2)
BASOPHILS RELATIVE PERCENT: 0.4 %
CREATININE URINE: 236 MG/DL
EOSINOPHILS ABSOLUTE: 0.1 K/UL (ref 0–0.7)
EOSINOPHILS RELATIVE PERCENT: 1.2 %
HCT VFR BLD CALC: 40.2 % (ref 42–52)
HEMOGLOBIN: 13.7 G/DL (ref 14–18)
LYMPHOCYTES ABSOLUTE: 1 K/UL (ref 1–4.8)
LYMPHOCYTES RELATIVE PERCENT: 21.1 %
MCH RBC QN AUTO: 31 PG (ref 27–31.3)
MCHC RBC AUTO-ENTMCNC: 34.1 % (ref 33–37)
MCV RBC AUTO: 90.9 FL (ref 80–100)
MICROALBUMIN UR-MCNC: 4.5 MG/DL
MICROALBUMIN/CREAT UR-RTO: 19.1 MG/G (ref 0–30)
MONOCYTES ABSOLUTE: 0.4 K/UL (ref 0.2–0.8)
MONOCYTES RELATIVE PERCENT: 8.5 %
NEUTROPHILS ABSOLUTE: 3.3 K/UL (ref 1.4–6.5)
NEUTROPHILS RELATIVE PERCENT: 68.8 %
PDW BLD-RTO: 15.2 % (ref 11.5–14.5)
PLATELET # BLD: 167 K/UL (ref 130–400)
PROSTATE SPECIFIC ANTIGEN: 0.39 NG/ML (ref 0–3.89)
RBC # BLD: 4.42 M/UL (ref 4.7–6.1)
WBC # BLD: 4.8 K/UL (ref 4.8–10.8)

## 2018-11-02 PROCEDURE — 99214 OFFICE O/P EST MOD 30 MIN: CPT | Performed by: NURSE PRACTITIONER

## 2018-11-02 PROCEDURE — 90471 IMMUNIZATION ADMIN: CPT | Performed by: NURSE PRACTITIONER

## 2018-11-02 PROCEDURE — 90472 IMMUNIZATION ADMIN EACH ADD: CPT | Performed by: NURSE PRACTITIONER

## 2018-11-02 PROCEDURE — 90715 TDAP VACCINE 7 YRS/> IM: CPT | Performed by: NURSE PRACTITIONER

## 2018-11-02 PROCEDURE — 90688 IIV4 VACCINE SPLT 0.5 ML IM: CPT | Performed by: NURSE PRACTITIONER

## 2018-11-02 RX ORDER — PANTOPRAZOLE SODIUM 40 MG/1
40 GRANULE, DELAYED RELEASE ORAL
COMMUNITY
End: 2019-09-10 | Stop reason: ALTCHOICE

## 2018-11-02 ASSESSMENT — PATIENT HEALTH QUESTIONNAIRE - PHQ9
SUM OF ALL RESPONSES TO PHQ9 QUESTIONS 1 & 2: 0
2. FEELING DOWN, DEPRESSED OR HOPELESS: 0
SUM OF ALL RESPONSES TO PHQ QUESTIONS 1-9: 0
SUM OF ALL RESPONSES TO PHQ QUESTIONS 1-9: 0
1. LITTLE INTEREST OR PLEASURE IN DOING THINGS: 0

## 2018-11-02 ASSESSMENT — ENCOUNTER SYMPTOMS
ABDOMINAL PAIN: 0
COUGH: 0
SHORTNESS OF BREATH: 0

## 2018-11-02 NOTE — PROGRESS NOTES
taken them since. BP has been good. Has been monitoring it at home and has been running well. Also requesting to go back on androgel. Had to stop prior to his surgery. Last testosterone level was in 4/2017.      Past Medical History:   Diagnosis Date    ED (erectile dysfunction)     GERD (gastroesophageal reflux disease)     History of cardiovascular stress test     Normal; Saw Dr. Alena Menjivar History of EKG 10/29/14    Hyperlipidemia     Hypertension     Hypogonadism in male 04/2017    Obesity, morbid, BMI 50 or higher ([facility]     EVELYN on CPAP     S/P laparoscopic sleeve gastrectomy 09/24/2018    Dr. Jacklyn Lima    Type II or unspecified type diabetes mellitus without mention of complication, not stated as uncontrolled     Vitamin D deficiency 10/17/2008     Patient Active Problem List    Diagnosis Date Noted    S/P laparoscopic sleeve gastrectomy 09/24/2018    ED (erectile dysfunction)     GERD (gastroesophageal reflux disease)     EVELYN on CPAP     Obesity, morbid, BMI 50 or higher ([address]     Hypogonadism in male 04/01/2017    Diabetes mellitus out of control ([facility] 09/17/2011    Hypertension 09/17/2011    Hyperlipidemia 09/17/2011    Vitamin D deficiency 10/17/2008     Past Surgical History:   Procedure Laterality Date    COLONOSCOPY  8/22/13    SLEEVE [facility]ASTRECTOMY  09/24/2018    [facility]    TONSILLECTOMY      TOOTH EXTRACTION Left 2014    left lower widom tooth extraction     Family History   Problem Relation Age of Onset    Heart Disease Father      Social History     Social History    Marital status:      Spouse name: N/A    Number of children: 0    Years of education: N/A     Social History Main Topics    Smoking status: Never Smoker    Smokeless tobacco: Never Used    Alcohol use Yes      Comment: rare    Drug use: No    Sexual activity: Yes     Partners: Female     Other Topics Concern    None     Social History Narrative    None     Current Outpatient

## 2018-11-03 LAB — TESTOSTERONE TOTAL-MALE: 253 NG/DL (ref 300–890)

## 2018-11-04 LAB — HIV-1 AND HIV-2 ANTIBODIES: NEGATIVE

## 2018-11-06 ENCOUNTER — OFFICE VISIT (OUTPATIENT)
Dept: ENDOCRINOLOGY | Age: 58
End: 2018-11-06
Payer: COMMERCIAL

## 2018-11-06 VITALS
RESPIRATION RATE: 20 BRPM | SYSTOLIC BLOOD PRESSURE: 117 MMHG | OXYGEN SATURATION: 92 % | HEIGHT: 70 IN | DIASTOLIC BLOOD PRESSURE: 77 MMHG | BODY MASS INDEX: 45.1 KG/M2 | TEMPERATURE: 97.6 F | WEIGHT: 315 LBS | HEART RATE: 67 BPM

## 2018-11-06 DIAGNOSIS — E66.01 MORBID OBESITY (HCC): ICD-10-CM

## 2018-11-06 DIAGNOSIS — Z98.84 S/P BARIATRIC SURGERY: ICD-10-CM

## 2018-11-06 DIAGNOSIS — E11.65 UNCONTROLLED TYPE 2 DIABETES MELLITUS WITH HYPERGLYCEMIA (HCC): Primary | ICD-10-CM

## 2018-11-06 LAB — GLUCOSE BLD-MCNC: 166 MG/DL

## 2018-11-06 PROCEDURE — 99213 OFFICE O/P EST LOW 20 MIN: CPT | Performed by: INTERNAL MEDICINE

## 2018-11-06 PROCEDURE — 82962 GLUCOSE BLOOD TEST: CPT | Performed by: INTERNAL MEDICINE

## 2018-11-06 RX ORDER — BLOOD SUGAR DIAGNOSTIC
1 STRIP MISCELLANEOUS DAILY
Qty: 100 EACH | Refills: 11 | Status: SHIPPED | OUTPATIENT
Start: 2018-11-06 | End: 2019-09-10 | Stop reason: ALTCHOICE

## 2018-11-12 NOTE — PROGRESS NOTES
Subjective:      Patient ID: Reilly Villarreal is a 62 y.o. male. 14 month f/u on diabetes   Diabetes   He presents for his follow-up diabetic visit. He has type 2 diabetes mellitus. Symptoms are improving. Diabetic complications include impotence, nephropathy and peripheral neuropathy. Risk factors for coronary artery disease include diabetes mellitus, obesity and male sex. Current diabetic treatment includes insulin injections. He is currently taking insulin pre-breakfast, pre-lunch and pre-dinner. His overall blood glucose range is 180-200 mg/dl. (Lab Results       Component                Value               Date                       LABA1C                   10.4 (H)            09/12/2018              Higher post prandial sugars )         Morbid obesity Body mass index is 51.97 kg/m².     Pt is s/p bariatric surgery 9/21/18 sleeve gastrectomy at [facility]   Has lost 40 lbs over 4 months     Patient Active Problem List   Diagnosis    Diabetes mellitus out of control ([facility]    Hypertension    Hyperlipidemia    Vitamin D deficiency    GERD (gastroesophageal reflux disease)    EVELYN on CPAP    Obesity, morbid, BMI 50 or higher ([facility]    Hypogonadism in male    ED (erectile dysfunction)    S/P laparoscopic sleeve gastrectomy     Allergies   Allergen Reactions    Tessalon Perles [Benzonatate] Other (See Comments)     hallucinations       Current Outpatient Prescriptions:     HUMALOG 100 UNIT/ML injection vial, 2 units plus sliding scale less than 180 none 181-250 4 units, Disp: 1 vial, Rfl: 1    Insulin Syringe-Needle U-100 (B-D INSULIN SYRINGE) 31G X 5/16\" 0.3 ML MISC, 1 each by Does not apply route daily, Disp: 100 each, Rfl: 11    pantoprazole sodium (PROTONIX) 40 MG PACK packet, Take 40 mg by mouth every morning (before breakfast), Disp: , Rfl:     Oxymetazoline HCl (NASAL SPRAY NA), by Nasal route, Disp: , Rfl:     glucose blood VI test strips (ONE TOUCH ULTRA TEST) strip, Test tid, Disp: 300 each,

## 2019-01-07 ENCOUNTER — OFFICE VISIT (OUTPATIENT)
Dept: ENDOCRINOLOGY | Age: 59
End: 2019-01-07
Payer: COMMERCIAL

## 2019-01-07 VITALS
BODY MASS INDEX: 45.1 KG/M2 | OXYGEN SATURATION: 97 % | HEIGHT: 70 IN | DIASTOLIC BLOOD PRESSURE: 80 MMHG | WEIGHT: 315 LBS | SYSTOLIC BLOOD PRESSURE: 118 MMHG | HEART RATE: 68 BPM

## 2019-01-07 DIAGNOSIS — E29.1 HYPOGONADISM IN MALE: ICD-10-CM

## 2019-01-07 DIAGNOSIS — N52.9 ERECTILE DYSFUNCTION, UNSPECIFIED ERECTILE DYSFUNCTION TYPE: ICD-10-CM

## 2019-01-07 DIAGNOSIS — E08.641 DIABETES MELLITUS DUE TO UNDERLYING CONDITION, UNCONTROLLED, WITH HYPOGLYCEMIA AND COMA (HCC): Primary | ICD-10-CM

## 2019-01-07 LAB
GLUCOSE BLD-MCNC: 232 MG/DL
HBA1C MFR BLD: 8.5 %

## 2019-01-07 PROCEDURE — 83036 HEMOGLOBIN GLYCOSYLATED A1C: CPT | Performed by: INTERNAL MEDICINE

## 2019-01-07 PROCEDURE — 99213 OFFICE O/P EST LOW 20 MIN: CPT | Performed by: INTERNAL MEDICINE

## 2019-01-07 PROCEDURE — 82962 GLUCOSE BLOOD TEST: CPT | Performed by: INTERNAL MEDICINE

## 2019-01-07 RX ORDER — TESTOSTERONE 16.2 MG/G
GEL TRANSDERMAL
Qty: 1 BOTTLE | Refills: 3 | Status: SHIPPED | OUTPATIENT
Start: 2019-01-07 | End: 2019-09-10

## 2019-01-07 RX ORDER — TADALAFIL 20 MG/1
20 TABLET ORAL PRN
Qty: 10 TABLET | Refills: 3 | Status: SHIPPED | OUTPATIENT
Start: 2019-01-07 | End: 2019-09-10

## 2019-01-15 ENCOUNTER — TELEPHONE (OUTPATIENT)
Dept: ENDOCRINOLOGY | Age: 59
End: 2019-01-15

## 2019-02-15 ENCOUNTER — TELEPHONE (OUTPATIENT)
Dept: FAMILY MEDICINE CLINIC | Age: 59
End: 2019-02-15

## 2019-02-15 DIAGNOSIS — E78.5 HYPERLIPIDEMIA, UNSPECIFIED HYPERLIPIDEMIA TYPE: Primary | ICD-10-CM

## 2019-03-01 ENCOUNTER — OFFICE VISIT (OUTPATIENT)
Dept: FAMILY MEDICINE CLINIC | Age: 59
End: 2019-03-01
Payer: COMMERCIAL

## 2019-03-01 VITALS
WEIGHT: 315 LBS | BODY MASS INDEX: 45.1 KG/M2 | OXYGEN SATURATION: 98 % | DIASTOLIC BLOOD PRESSURE: 68 MMHG | TEMPERATURE: 97.7 F | HEIGHT: 70 IN | SYSTOLIC BLOOD PRESSURE: 106 MMHG | HEART RATE: 60 BPM

## 2019-03-01 DIAGNOSIS — E78.5 HYPERLIPIDEMIA, UNSPECIFIED HYPERLIPIDEMIA TYPE: ICD-10-CM

## 2019-03-01 DIAGNOSIS — I10 ESSENTIAL HYPERTENSION: Primary | ICD-10-CM

## 2019-03-01 DIAGNOSIS — Z23 NEED FOR PNEUMOCOCCAL VACCINE: ICD-10-CM

## 2019-03-01 DIAGNOSIS — E08.641 DIABETES MELLITUS DUE TO UNDERLYING CONDITION, UNCONTROLLED, WITH HYPOGLYCEMIA AND COMA (HCC): ICD-10-CM

## 2019-03-01 PROCEDURE — 90732 PPSV23 VACC 2 YRS+ SUBQ/IM: CPT | Performed by: NURSE PRACTITIONER

## 2019-03-01 PROCEDURE — 90471 IMMUNIZATION ADMIN: CPT | Performed by: NURSE PRACTITIONER

## 2019-03-01 PROCEDURE — 99213 OFFICE O/P EST LOW 20 MIN: CPT | Performed by: NURSE PRACTITIONER

## 2019-03-01 ASSESSMENT — ENCOUNTER SYMPTOMS
COUGH: 0
SHORTNESS OF BREATH: 0

## 2019-05-29 DIAGNOSIS — E78.5 HYPERLIPIDEMIA, UNSPECIFIED HYPERLIPIDEMIA TYPE: ICD-10-CM

## 2019-05-29 LAB
CHOLESTEROL, TOTAL: 189 MG/DL (ref 0–199)
HDLC SERPL-MCNC: 42 MG/DL (ref 40–59)
LDL CHOLESTEROL CALCULATED: 138 MG/DL (ref 0–129)
TRIGL SERPL-MCNC: 47 MG/DL (ref 0–150)

## 2019-09-10 ENCOUNTER — OFFICE VISIT (OUTPATIENT)
Dept: FAMILY MEDICINE CLINIC | Age: 59
End: 2019-09-10
Payer: COMMERCIAL

## 2019-09-10 VITALS
HEIGHT: 70 IN | BODY MASS INDEX: 39.94 KG/M2 | WEIGHT: 279 LBS | HEART RATE: 71 BPM | DIASTOLIC BLOOD PRESSURE: 70 MMHG | SYSTOLIC BLOOD PRESSURE: 108 MMHG | TEMPERATURE: 97.4 F | OXYGEN SATURATION: 98 %

## 2019-09-10 DIAGNOSIS — Z12.5 PROSTATE CANCER SCREENING: ICD-10-CM

## 2019-09-10 DIAGNOSIS — E08.641 DIABETES MELLITUS DUE TO UNDERLYING CONDITION, UNCONTROLLED, WITH HYPOGLYCEMIA AND COMA (HCC): Primary | ICD-10-CM

## 2019-09-10 DIAGNOSIS — I10 ESSENTIAL HYPERTENSION: ICD-10-CM

## 2019-09-10 DIAGNOSIS — Z98.84 S/P LAPAROSCOPIC SLEEVE GASTRECTOMY: ICD-10-CM

## 2019-09-10 DIAGNOSIS — E78.5 HYPERLIPIDEMIA, UNSPECIFIED HYPERLIPIDEMIA TYPE: ICD-10-CM

## 2019-09-10 PROCEDURE — 99213 OFFICE O/P EST LOW 20 MIN: CPT | Performed by: NURSE PRACTITIONER

## 2019-09-10 RX ORDER — OMEPRAZOLE 20 MG/1
CAPSULE, DELAYED RELEASE ORAL
COMMUNITY
Start: 2019-06-08 | End: 2019-09-19 | Stop reason: SDUPTHER

## 2019-09-10 ASSESSMENT — ENCOUNTER SYMPTOMS
SHORTNESS OF BREATH: 0
COUGH: 0

## 2019-09-10 NOTE — PROGRESS NOTES
Subjective  Chief Complaint   Patient presents with    6 Month Follow-Up     6 month f/u DM, states that he still tests his sugars but has not been taking insulin d/t weight loss. Diabetes   He presents for his follow-up diabetic visit. He has type 2 diabetes mellitus. His disease course has been stable. There are no hypoglycemic associated symptoms. Pertinent negatives for hypoglycemia include no dizziness, headaches or nervousness/anxiousness. Associated symptoms include weight loss (intentional). Pertinent negatives for diabetes include no chest pain, no fatigue, no polydipsia, no polyphagia and no polyuria. There are no hypoglycemic complications. Symptoms are stable. There are no diabetic complications. Risk factors for coronary artery disease include diabetes mellitus, dyslipidemia, male sex and obesity. When asked about current treatments, none were reported. His weight is decreasing steadily. He is following a generally healthy and diabetic diet. Meal planning includes avoidance of concentrated sweets and calorie counting. He has not had a previous visit with a dietitian. He participates in exercise daily (at least 5 times per week). There is no change in his home blood glucose trend. His breakfast blood glucose range is generally 110-130 mg/dl. An ACE inhibitor/angiotensin II receptor blocker is not being taken. He does not see a podiatrist.Eye exam is current. Pt here for follow up on DM. Had gastrectomy through [facility] in September 2018. Has lost close to 140 pounds. States he is amazed at how much better he feels now. Blood pressure is now well controlled not on any medications. ASCVD risk is 12%; however patient is not interested in statin at this time given his continued weight loss and healthy lifestyle now.     Past Medical History:   Diagnosis Date    ED (erectile dysfunction)     GERD (gastroesophageal reflux disease)     History of cardiovascular stress test Normal; Saw Dr. Rosanna Dominguez History of EKG 10/29/14    Hyperlipidemia     Hypertension     Hypogonadism in male 2017    Obesity, morbid, BMI 50 or higher ([address]     EVELYN on CPAP     S/P laparoscopic sleeve gastrectomy 2018    Dr. Haja Daly Type II or unspecified type diabetes mellitus without mention of complication, not stated as uncontrolled     Vitamin D deficiency 10/17/2008     Patient Active Problem List    Diagnosis Date Noted    Diabetes mellitus due to underlying condition, uncontrolled, with hypoglycemia and coma ([address] 2019    S/P laparoscopic sleeve gastrectomy 2018    ED (erectile dysfunction)     GERD (gastroesophageal reflux disease)     EVELYN on CPAP     Obesity, morbid, BMI 50 or higher ([address]     Hypogonadism in male 2017    Diabetes mellitus out of control ([address] 2011    Hypertension 2011    Hyperlipidemia 2011    Vitamin D deficiency 10/17/2008     Past Surgical History:   Procedure Laterality Date    COLONOSCOPY  13    [facility]    TONSILLECTOMY      TOOTH EXTRACTION Left 2014    left lower widom tooth extraction     Family History   Problem Relation Age of Onset    Heart Disease Father          due to MI     Social History     Socioeconomic History    Marital status:      Spouse name: None    Number of children: 0    Years of education: None    Highest education level: None   Occupational History    None   Social Needs    Financial resource strain: None    Food insecurity:     Worry: None     Inability: None    Transportation needs:     Medical: None     Non-medical: None   Tobacco Use    Smoking status: Never Smoker    Smokeless tobacco: Never Used   Substance and Sexual Activity    Alcohol use: Yes     Comment: rare    Drug use: No    Sexual activity: Yes     Partners: Female   Lifestyle    Physical activity:     Days per week: None     Minutes per session: None    Stress: None   Relationships    Social connections:     Talks on phone: None     Gets together: None     Attends Spiritism service: None     Active member of club or organization: None     Attends meetings of clubs or organizations: None     Relationship status: None    Intimate partner violence:     Fear of current or ex partner: None     Emotionally abused: None     Physically abused: None     Forced sexual activity: None   Other Topics Concern    None   Social History Narrative    None     Current Outpatient Medications on File Prior to Visit   Medication Sig Dispense Refill    omeprazole (PRILOSEC) 20 MG delayed release capsule       Multiple Vitamins-Minerals (BARIATRIC MULTIVITAMINS/IRON PO) Take 1 tablet by mouth daily As directed      glucose blood VI test strips (ONE TOUCH ULTRA TEST) strip Test tid 300 each 3    Oxymetazoline HCl (NASAL SPRAY NA) by Nasal route       No current facility-administered medications on file prior to visit. No Known Allergies    Review of Systems   Constitutional: Positive for weight loss (intentional). Negative for chills, diaphoresis, fatigue, fever and unexpected weight change. HENT: Negative for congestion. Respiratory: Negative for cough and shortness of breath. Cardiovascular: Positive for leg swelling (intermittent, related to sodium intake or food choices). Negative for chest pain and palpitations. Endocrine: Negative for polydipsia, polyphagia and polyuria. Neurological: Negative for dizziness and headaches. Psychiatric/Behavioral: Negative for dysphoric mood. The patient is not nervous/anxious. Objective  Vitals:    09/10/19 1615   BP: 108/70   Site: Left Upper Arm   Position: Sitting   Cuff Size: Large Adult   Pulse: 71   Temp: 97.4 °F (36.3 °C)   TempSrc: Tympanic   SpO2: 98%   Weight: 279 lb (126.6 kg)   Height: 5' 10\" (1.778 m)     Physical Exam   Constitutional: He is oriented to person, place, and time.  He

## 2019-09-19 RX ORDER — OMEPRAZOLE 20 MG/1
20 CAPSULE, DELAYED RELEASE ORAL DAILY
Qty: 30 CAPSULE | Refills: 5 | Status: SHIPPED | OUTPATIENT
Start: 2019-09-19

## 2020-05-22 ENCOUNTER — TELEPHONE (OUTPATIENT)
Dept: FAMILY MEDICINE CLINIC | Age: 60
End: 2020-05-22

## 2020-10-06 ENCOUNTER — NURSE ONLY (OUTPATIENT)
Dept: FAMILY MEDICINE CLINIC | Age: 60
End: 2020-10-06

## 2020-10-06 PROCEDURE — 90688 IIV4 VACCINE SPLT 0.5 ML IM: CPT | Performed by: NURSE PRACTITIONER

## 2020-10-06 PROCEDURE — 90471 IMMUNIZATION ADMIN: CPT | Performed by: NURSE PRACTITIONER

## 2020-10-06 NOTE — PROGRESS NOTES
After obtaining consent, and per orders of Alicia MARIA, injection of flu vaccine was given in the Left deltoid by [facility]. Patient instructed to remain in clinic for 20 minutes afterwards, and to report any adverse reaction to me immediately.  Tolerated well

## 2020-10-06 NOTE — PROGRESS NOTES
Vaccine Information Sheet, \"Influenza - Inactivated\"  given to Nolberto Cole, or parent/legal guardian of  Nolberto Cole and verbalized understanding. Patient responses:    Have you ever had a reaction to a flu vaccine? No  Are you able to eat eggs without adverse effects? Yes  Do you have any current illness? No  Have you ever had [place] Syndrome? No    Flu vaccine given per order. Please see immunization tab.

## 2022-01-12 ENCOUNTER — TELEPHONE (OUTPATIENT)
Dept: FAMILY MEDICINE CLINIC | Age: 62
End: 2022-01-12

## 2022-09-06 ENCOUNTER — OFFICE VISIT (OUTPATIENT)
Dept: FAMILY MEDICINE CLINIC | Age: 62
End: 2022-09-06
Payer: COMMERCIAL

## 2022-09-06 VITALS
HEART RATE: 77 BPM | WEIGHT: 253 LBS | SYSTOLIC BLOOD PRESSURE: 116 MMHG | HEIGHT: 70 IN | OXYGEN SATURATION: 99 % | BODY MASS INDEX: 36.22 KG/M2 | DIASTOLIC BLOOD PRESSURE: 78 MMHG

## 2022-09-06 DIAGNOSIS — I10 PRIMARY HYPERTENSION: ICD-10-CM

## 2022-09-06 DIAGNOSIS — R53.83 FATIGUE, UNSPECIFIED TYPE: ICD-10-CM

## 2022-09-06 DIAGNOSIS — B35.1 ONYCHOMYCOSIS OF LEFT GREAT TOE: Primary | ICD-10-CM

## 2022-09-06 DIAGNOSIS — Z12.5 PROSTATE CANCER SCREENING: ICD-10-CM

## 2022-09-06 DIAGNOSIS — E78.5 HYPERLIPIDEMIA, UNSPECIFIED HYPERLIPIDEMIA TYPE: ICD-10-CM

## 2022-09-06 DIAGNOSIS — E08.641 DIABETES MELLITUS DUE TO UNDERLYING CONDITION, UNCONTROLLED, WITH HYPOGLYCEMIA AND COMA (HCC): ICD-10-CM

## 2022-09-06 DIAGNOSIS — Z90.3 H/O GASTRIC SLEEVE: ICD-10-CM

## 2022-09-06 DIAGNOSIS — R35.0 URINARY FREQUENCY: ICD-10-CM

## 2022-09-06 LAB
BILIRUBIN URINE: NEGATIVE
BLOOD, URINE: NEGATIVE
CLARITY: CLEAR
COLOR: YELLOW
CREATININE URINE: 46.6 MG/DL
GLUCOSE URINE: >=1000 MG/DL
KETONES, URINE: NEGATIVE MG/DL
LEUKOCYTE ESTERASE, URINE: NEGATIVE
MICROALBUMIN UR-MCNC: 2.7 MG/DL
MICROALBUMIN/CREAT UR-RTO: 57.9 MG/G (ref 0–30)
NITRITE, URINE: NEGATIVE
PH UA: 5.5 (ref 5–9)
PROTEIN UA: NEGATIVE MG/DL
SPECIFIC GRAVITY UA: 1.04 (ref 1–1.03)
UROBILINOGEN, URINE: 0.2 E.U./DL

## 2022-09-06 PROCEDURE — G8417 CALC BMI ABV UP PARAM F/U: HCPCS | Performed by: NURSE PRACTITIONER

## 2022-09-06 PROCEDURE — 3017F COLORECTAL CA SCREEN DOC REV: CPT | Performed by: NURSE PRACTITIONER

## 2022-09-06 PROCEDURE — 99214 OFFICE O/P EST MOD 30 MIN: CPT | Performed by: NURSE PRACTITIONER

## 2022-09-06 PROCEDURE — 1036F TOBACCO NON-USER: CPT | Performed by: NURSE PRACTITIONER

## 2022-09-06 PROCEDURE — G8427 DOCREV CUR MEDS BY ELIG CLIN: HCPCS | Performed by: NURSE PRACTITIONER

## 2022-09-06 SDOH — ECONOMIC STABILITY: FOOD INSECURITY: WITHIN THE PAST 12 MONTHS, YOU WORRIED THAT YOUR FOOD WOULD RUN OUT BEFORE YOU GOT MONEY TO BUY MORE.: NEVER TRUE

## 2022-09-06 SDOH — ECONOMIC STABILITY: FOOD INSECURITY: WITHIN THE PAST 12 MONTHS, THE FOOD YOU BOUGHT JUST DIDN'T LAST AND YOU DIDN'T HAVE MONEY TO GET MORE.: NEVER TRUE

## 2022-09-06 ASSESSMENT — PATIENT HEALTH QUESTIONNAIRE - PHQ9
SUM OF ALL RESPONSES TO PHQ9 QUESTIONS 1 & 2: 0
2. FEELING DOWN, DEPRESSED OR HOPELESS: 0
SUM OF ALL RESPONSES TO PHQ QUESTIONS 1-9: 0
1. LITTLE INTEREST OR PLEASURE IN DOING THINGS: 0

## 2022-09-06 ASSESSMENT — ENCOUNTER SYMPTOMS
COUGH: 0
BACK PAIN: 0
DIARRHEA: 0
COLOR CHANGE: 0
CONSTIPATION: 0
SHORTNESS OF BREATH: 0
ABDOMINAL PAIN: 0
EYE PAIN: 0
CHEST TIGHTNESS: 0
TROUBLE SWALLOWING: 0

## 2022-09-06 ASSESSMENT — SOCIAL DETERMINANTS OF HEALTH (SDOH): HOW HARD IS IT FOR YOU TO PAY FOR THE VERY BASICS LIKE FOOD, HOUSING, MEDICAL CARE, AND HEATING?: NOT HARD AT ALL

## 2022-09-06 NOTE — PROGRESS NOTES
Subjective  Chief Complaint   Patient presents with    Health Maintenance     A1C test ordered, lipid panel ordered    Wound Infection     Pt noticed a possible infection on his toenail a couple of months ago, a year ago he injured it by dropping something on it and it fell off, grew back and turned black again. Dysuria     Pt states his urination last longer than usual and he had some tenderness and burning sensation on the base of his penis. Dysuria   Associated symptoms include frequency. Pertinent negatives include no chills. Discuss concerns as above. Last year dropped something on his left great toe, ended up losing the toenail, when it grew back, it grew in darkened and yellow and thick. Has been that way for at least 2-3 months. Issues with urination, takes a long time, will think he is done and then more comes out. Also having a sensitivity/burning towards the base of his penis. Appears reddish in color. Symptoms started around the same time approx 2 months ago. Also with urinary frequency d/t feeling like he is not emptying all the time. Had covid a couple months ago and has had some fatigue since that time. Also notices equilibrium has been off since having covid. Has not been checking glucose at home. Overdue for labs.      Past Medical History:   Diagnosis Date    ED (erectile dysfunction)     GERD (gastroesophageal reflux disease)     History of cardiovascular stress test     Normal; Saw Dr. 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 Faulkner Grandchild    Type II or unspecified type diabetes mellitus without mention of complication, not stated as uncontrolled     Vitamin D deficiency 10/17/2008     Patient Active Problem List    Diagnosis Date Noted    Diabetes mellitus due to underlying condition, uncontrolled, with hypoglycemia and coma ([facility] 2019    S/P laparoscopic sleeve gastrectomy 2018    ED (erectile dysfunction)     GERD (gastroesophageal reflux disease)     EVELYN on CPAP     Obesity, morbid, BMI 50 or higher ([facility]     Hypogonadism in male 2017    Diabetes mellitus out of control ([facility] 2011    Hypertension 2011    Hyperlipidemia 2011    Vitamin D deficiency 10/17/2008     Past Surgical History:   Procedure Laterality Date    COLONOSCOPY  13    SLEEVE GASTRECTOMY  2018    [address] [facility] [place] [address] EXTRACTION Left 2014    left lower widom tooth extraction     Family History   Problem Relation Age of Onset    Heart Disease Father          due to MI     Social History     Socioeconomic History    Marital status:      Spouse name: None    Number of children: 0    Years of education: None    Highest education level: None   Tobacco Use    Smoking status: Never    Smokeless tobacco: Never   Substance and Sexual Activity    Alcohol use: Yes     Comment: rare    Drug use: No    Sexual activity: Yes     Partners: Female     Social Determinants of Health     Financial Resource Strain: Low Risk     Difficulty of Paying Living Expenses: Not hard at all   Food Insecurity: No Food Insecurity    Worried About Running Out of Food in the Last Year: Never true    Ran Out of Food in the Last Year: Never true     Current Outpatient Medications on File Prior to Visit   Medication Sig Dispense Refill    omeprazole (PRILOSEC) 20 MG delayed release capsule Take 1 capsule by mouth Daily 30 capsule 5    Multiple Vitamins-Minerals (BARIATRIC MULTIVITAMINS/IRON PO) Take 1 tablet by mouth daily As directed      Oxymetazoline HCl (NASAL SPRAY NA) by Nasal route      glucose blood VI test strips (ONE TOUCH ULTRA TEST) strip Test tid 300 each 3     No current facility-administered medications on file prior to visit.      No Known Allergies    Review of Systems Constitutional:  Positive for fatigue. Negative for activity change, appetite change, chills, diaphoresis and fever. HENT:  Negative for congestion, ear pain, hearing loss and trouble swallowing. Eyes:  Negative for pain and visual disturbance. Respiratory:  Negative for cough, chest tightness and shortness of breath. Cardiovascular:  Negative for chest pain, palpitations and leg swelling. Gastrointestinal:  Negative for abdominal pain, constipation and diarrhea. Endocrine: Negative for polydipsia, polyphagia and polyuria. Genitourinary:  Positive for dysuria, frequency and penile pain. Negative for difficulty urinating. Musculoskeletal:  Negative for arthralgias and back pain. Skin:  Negative for color change and rash. Neurological:  Negative for dizziness and light-headedness. Psychiatric/Behavioral:  Negative for dysphoric mood. The patient is not nervous/anxious. Objective  Vitals:    09/06/22 1110   BP: 116/78   Site: Left Upper Arm   Position: Sitting   Cuff Size: Medium Adult   Pulse: 77   SpO2: 99%   Weight: 253 lb (114.8 kg)   Height: 5' 10\" (1.778 m)     Physical Exam  Vitals reviewed. Constitutional:       General: He is not in acute distress. Appearance: Normal appearance. He is well-developed and normal weight. He is not diaphoretic. HENT:      Head: Normocephalic and atraumatic. Right Ear: Hearing, tympanic membrane, ear canal and external ear normal. There is no impacted cerumen. Left Ear: Hearing, tympanic membrane, ear canal and external ear normal. There is no impacted cerumen. Nose: Nose normal. No congestion or rhinorrhea. Mouth/Throat:      Mouth: Mucous membranes are moist.      Pharynx: Oropharynx is clear. No oropharyngeal exudate or posterior oropharyngeal erythema. Eyes:      General:         Right eye: No discharge. Left eye: No discharge.       Conjunctiva/sclera: Conjunctivae normal.      Pupils: Pupils are equal, round, Vitamin B12 & Folate    Vitamin D 25 Hydroxy      7. H/O gastric sleeve  Vitamin B12 & Folate    Vitamin D 25 Hydroxy      8. Prostate cancer screening  PSA Screening        Plan as above. F/u after testing. Side effects, adverse effects of the medication prescribed today, as well as treatment plan/ rationale and result expectations have been discussed with the patient who expresses understanding and desires to proceed. Close follow up to evaluate treatment results and for coordination of care. I have reviewed the patient's medical history in detail and updated the computerized patient record. As always, patient is advised that if symptoms worsen in any way they will proceed to the nearest emergency room. Orders Placed This Encounter   Procedures    Culture, Urine     Standing Status:   Future     Standing Expiration Date:   9/6/2023     Order Specific Question:   Specify (ex-cath, midstream, cysto, etc)? Answer:   midstream    Hemoglobin A1C     Standing Status:   Future     Standing Expiration Date:   9/6/2023    Lipid Panel     Standing Status:   Future     Standing Expiration Date:   9/6/2023     Order Specific Question:   Is Patient Fasting?/# of Hours     Answer:   15     Order Specific Question:   Has the patient fasted?      Answer:   Yes    Microalbumin / Creatinine Urine Ratio     Standing Status:   Future     Standing Expiration Date:   9/6/2023    Comprehensive Metabolic Panel     Standing Status:   Future     Standing Expiration Date:   9/6/2023    CBC     Standing Status:   Future     Standing Expiration Date:   9/6/2023    TSH with Reflex     Standing Status:   Future     Standing Expiration Date:   9/6/2023    Vitamin B12 & Folate     Standing Status:   Future     Standing Expiration Date:   9/6/2023    Vitamin D 25 Hydroxy     Standing Status:   Future     Standing Expiration Date:   9/6/2023    Urinalysis     Standing Status:   Future     Standing Expiration Date:   9/6/2023 PSA Screening     Standing Status:   Future     Standing Expiration Date:   9/6/2023    Moy Clayton DPM, Podiatry, [place]     Referral Priority:   Routine     Referral Type:   Eval and Treat     Referral Reason:   Specialty Services Required     Referred to Provider:   Lori Centeno DPM     Requested Specialty:   Podiatry     Number of Visits Requested:   1       No orders of the defined types were placed in this encounter. There are no discontinued medications. No follow-ups on file.     EMILY Roa - CNP

## 2022-09-07 LAB — URINE CULTURE, ROUTINE: NORMAL

## 2022-09-08 DIAGNOSIS — Z90.3 H/O GASTRIC SLEEVE: ICD-10-CM

## 2022-09-08 DIAGNOSIS — Z12.5 PROSTATE CANCER SCREENING: ICD-10-CM

## 2022-09-08 DIAGNOSIS — E78.5 HYPERLIPIDEMIA, UNSPECIFIED HYPERLIPIDEMIA TYPE: ICD-10-CM

## 2022-09-08 DIAGNOSIS — R53.83 FATIGUE, UNSPECIFIED TYPE: ICD-10-CM

## 2022-09-08 DIAGNOSIS — I10 PRIMARY HYPERTENSION: ICD-10-CM

## 2022-09-08 DIAGNOSIS — E08.641 DIABETES MELLITUS DUE TO UNDERLYING CONDITION, UNCONTROLLED, WITH HYPOGLYCEMIA AND COMA (HCC): ICD-10-CM

## 2022-09-08 LAB
ALBUMIN SERPL-MCNC: 4.6 G/DL (ref 3.5–4.6)
ALP BLD-CCNC: 110 U/L (ref 35–104)
ALT SERPL-CCNC: 9 U/L (ref 0–41)
ANION GAP SERPL CALCULATED.3IONS-SCNC: 15 MEQ/L (ref 9–15)
AST SERPL-CCNC: 15 U/L (ref 0–40)
BILIRUB SERPL-MCNC: 0.5 MG/DL (ref 0.2–0.7)
BUN BLDV-MCNC: 32 MG/DL (ref 8–23)
CALCIUM SERPL-MCNC: 9.8 MG/DL (ref 8.5–9.9)
CHLORIDE BLD-SCNC: 104 MEQ/L (ref 95–107)
CHOLESTEROL, TOTAL: 296 MG/DL (ref 0–199)
CO2: 24 MEQ/L (ref 20–31)
CREAT SERPL-MCNC: 0.91 MG/DL (ref 0.7–1.2)
GFR AFRICAN AMERICAN: >60
GFR NON-AFRICAN AMERICAN: >60
GLOBULIN: 3.2 G/DL (ref 2.3–3.5)
GLUCOSE BLD-MCNC: 369 MG/DL (ref 70–99)
HBA1C MFR BLD: 15 % (ref 4.8–5.9)
HCT VFR BLD CALC: 44.6 % (ref 42–52)
HDLC SERPL-MCNC: 52 MG/DL (ref 40–59)
HEMOGLOBIN: 15 G/DL (ref 14–18)
LDL CHOLESTEROL CALCULATED: 229 MG/DL (ref 0–129)
MCH RBC QN AUTO: 30 PG (ref 27–31.3)
MCHC RBC AUTO-ENTMCNC: 33.7 % (ref 33–37)
MCV RBC AUTO: 88.8 FL (ref 80–100)
PDW BLD-RTO: 13.1 % (ref 11.5–14.5)
PLATELET # BLD: 233 K/UL (ref 130–400)
POTASSIUM SERPL-SCNC: 5.1 MEQ/L (ref 3.4–4.9)
PROSTATE SPECIFIC ANTIGEN: 0.34 NG/ML (ref 0–4)
RBC # BLD: 5.02 M/UL (ref 4.7–6.1)
SODIUM BLD-SCNC: 143 MEQ/L (ref 135–144)
TOTAL PROTEIN: 7.8 G/DL (ref 6.3–8)
TRIGL SERPL-MCNC: 74 MG/DL (ref 0–150)
TSH REFLEX: 2.03 UIU/ML (ref 0.44–3.86)
WBC # BLD: 5.6 K/UL (ref 4.8–10.8)

## 2022-09-09 ENCOUNTER — INITIAL CONSULT (OUTPATIENT)
Dept: PODIATRY | Age: 62
End: 2022-09-09
Payer: COMMERCIAL

## 2022-09-09 VITALS
DIASTOLIC BLOOD PRESSURE: 76 MMHG | TEMPERATURE: 97.6 F | WEIGHT: 250 LBS | HEIGHT: 70 IN | BODY MASS INDEX: 35.79 KG/M2 | SYSTOLIC BLOOD PRESSURE: 118 MMHG

## 2022-09-09 DIAGNOSIS — S90.212S: ICD-10-CM

## 2022-09-09 DIAGNOSIS — M20.5X2 ACQUIRED HALLUX LIMITUS OF BOTH FEET: ICD-10-CM

## 2022-09-09 DIAGNOSIS — M20.5X1 ACQUIRED HALLUX LIMITUS OF BOTH FEET: ICD-10-CM

## 2022-09-09 DIAGNOSIS — B35.1 DERMATOPHYTOSIS OF NAIL: ICD-10-CM

## 2022-09-09 DIAGNOSIS — L84 CALLUS: ICD-10-CM

## 2022-09-09 DIAGNOSIS — E11.42 DIABETIC POLYNEUROPATHY ASSOCIATED WITH TYPE 2 DIABETES MELLITUS (HCC): Primary | ICD-10-CM

## 2022-09-09 LAB
FOLATE: 12.7 NG/ML
VITAMIN B-12: 764 PG/ML (ref 232–1245)
VITAMIN D 25-HYDROXY: 42.2 NG/ML

## 2022-09-09 PROCEDURE — 1036F TOBACCO NON-USER: CPT | Performed by: PODIATRIST

## 2022-09-09 PROCEDURE — 3046F HEMOGLOBIN A1C LEVEL >9.0%: CPT | Performed by: PODIATRIST

## 2022-09-09 PROCEDURE — 2022F DILAT RTA XM EVC RTNOPTHY: CPT | Performed by: PODIATRIST

## 2022-09-09 PROCEDURE — 3017F COLORECTAL CA SCREEN DOC REV: CPT | Performed by: PODIATRIST

## 2022-09-09 PROCEDURE — 99203 OFFICE O/P NEW LOW 30 MIN: CPT | Performed by: PODIATRIST

## 2022-09-09 PROCEDURE — G8417 CALC BMI ABV UP PARAM F/U: HCPCS | Performed by: PODIATRIST

## 2022-09-09 PROCEDURE — G8427 DOCREV CUR MEDS BY ELIG CLIN: HCPCS | Performed by: PODIATRIST

## 2022-09-09 ASSESSMENT — ENCOUNTER SYMPTOMS
BACK PAIN: 0
NAUSEA: 0
VOMITING: 0
SHORTNESS OF BREATH: 0

## 2022-09-09 NOTE — PROGRESS NOTES
[facility]  [address] [place] [address]  Dept: [phone]  Loc: [phone]       Remigio Shelton  (1960)    9/9/22    Subjective     Remigio Shelton is 58 y.o. male who complains today of:    Chief Complaint   Patient presents with    Nail Problem       Remigio Shelton is seen in consultation at the request of EMILY Snyder CNP for evaluation of onychomycosis. HPI: Patient presents with a complaint of fungal infection to the left big toenail. Patient states that he has a history of ingrowing nails to both big toes and had partial permanent nail avulsions performed to both sides of the toenails to both big toes. Patient reports that about a year and a half ago he dropped a frozen chicken on in his foot out of his freezer at home. He states that the nail turned black and fell off but slowly grew back. Patient noticed black discoloration approximately 2 to 3 months ago he does not recall new trauma to the toe. Patient is noted debris under the nail plate. Patient is a diabetic. Patient is not currently checking his blood glucose. Patient had bariatric surgery 3 years ago and was well controlled. He correlates his recent increase in hemoglobin A1c due to suffering from COVID earlier this year. Patient's most recent hemoglobin A1c was 15%. Patient reports occasional numbness to the toes but denies tingling or burning sensations. Patient denies a history of diabetic foot ulceration. Patient does not currently have diabetic shoes. Review of Systems   Constitutional:  Negative for chills and fever. HENT:  Negative for hearing loss. Respiratory:  Negative for shortness of breath. Cardiovascular:  Negative for chest pain. Gastrointestinal:  Negative for nausea and vomiting. Genitourinary:  Negative for difficulty urinating.    Musculoskeletal:  Negative for back pain and gait problem. Skin:  Negative for wound. Neurological:  Negative for numbness. Hematological:  Does not bruise/bleed easily. Psychiatric/Behavioral:  Negative for sleep disturbance. The patient is a diabetic. PCP/ Endocrinologist: EMILY Medeiros CNP   Date last seen: 9/6/22    Allergies:  Patient has no known allergies. Current Outpatient Medications on File Prior to Visit   Medication Sig Dispense Refill    omeprazole (PRILOSEC) 20 MG delayed release capsule Take 1 capsule by mouth Daily 30 capsule 5    Multiple Vitamins-Minerals (BARIATRIC MULTIVITAMINS/IRON PO) Take 1 tablet by mouth daily As directed      Oxymetazoline HCl (NASAL SPRAY NA) by Nasal route      glucose blood VI test strips (ONE TOUCH ULTRA TEST) strip Test tid 300 each 3     No current facility-administered medications on file prior to visit.        Past Medical History:   Diagnosis Date    ED (erectile dysfunction)     GERD (gastroesophageal reflux disease)     History of cardiovascular stress test     Normal; Saw Dr. 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 Cosmo Patel    Type II or unspecified type diabetes mellitus without mention of complication, not stated as uncontrolled     Vitamin D deficiency 10/17/2008     Past Surgical History:   Procedure Laterality Date    COLONOSCOPY  8/22/13    SLEEVE GASTRECTOMY  09/24/2018    [facility] EXTRACTION Left 2014    left lower widom tooth extraction     Social History     Socioeconomic History    Marital status:      Spouse name: Not on file    Number of children: 0    Years of education: Not on file    Highest education level: Not on file   Occupational History    Not on file   Tobacco Use    Smoking status: Never    Smokeless tobacco: Never Condition: Right toenails are abnormally thick. Fungal disease present. Left foot:      Protective Sensation: 10 sites tested. 8 sites sensed. Skin integrity: Callus and dry skin present. No ulcer or erythema. Toenail Condition: Left toenails are abnormally thick. Fungal disease present. Comments: Rectus foot type noted bilaterally. MMT graded 5/5 for all extrinsic foot muscle groups bilaterally. Small bony prominence noted at the dorsal head of the first metatarsal bilaterally, decreased first MPJ dorsiflexion noted with simulated weightbearing. Decreased ankle joint dorsiflexion noted bilaterally, soft endpoint noted, this improves with knee flexion. Lymphadenopathy:      Comments: Popliteal lymph nodes are soft and nontender. Skin:     General: Skin is dry. Capillary Refill: Capillary refill takes 2 to 3 seconds. Comments: No open lesions noted bilaterally. Mildly xerotic skin texture noted to the bilateral plantar foot, focal hyperkeratotic lesion noted to the medial aspect of the hallux IPJ bilaterally. Normal skin turgor noted bilaterally. There is dried blood/black discoloration noted to the left hallux toenail, approximately 3 mm of proximal clearing (new nail plate growth) noted at the cuticle. The distal portion of the nail plate is loosened from the nailbed, there is subungual debris consistent with fungus. The right fifth toenail is yellow in color, thickened, and there is subungual debris. The bilateral border of the bilateral hallux nail has been permanently removed. The remaining nails are thickened but are within normal limits of color. Neurological:      Mental Status: He is alert and oriented to person, place, and time. Deep Tendon Reflexes: Babinski sign absent on the right side. Babinski sign absent on the left side. Reflex Scores:       Patellar reflexes are 1+ on the right side and 1+ on the left side.        Achilles reflexes are 1+ on the right side and 1+ on the left side. Comments: No ankle clonus noted bilaterally. Diminished vibratory sensation noted bilaterally. Sharp versus dull discrimination is intact bilaterally. Protective sensation is absent to the third and fifth toe bilaterally. Psychiatric:         Mood and Affect: Mood normal.         Behavior: Behavior normal.       Assessment:      Diagnosis Orders   1. Diabetic polyneuropathy associated with type 2 diabetes mellitus ([facility]        2. Dermatophytosis of nail        3. Hematoma, subungual, great toe, left, sequela        4. Callus        5. Acquired hallux limitus of both feet            Plan:     Onychomycosis: I have prescribed ciclopirox topical antifungal solution, the patient is instructed to apply this to the affected toenails nightly. Patient is instructed to remove the layers of the solution once per week to allow for improved medication penetration. Diabetes mellitus: I discussed the \"do's and donts\" of diabetes mellitus and diabetic foot care. The patient should adhere to the random glucose monitoring schedule according to their internist/endocrinologist and report any significant fluctuations or problems to them immediately. I emphasized the importance of daily foot checks and applying a moisturizing cream to the feet daily, but not in between the toes. If any ulcerations or signs and symptoms of infection arise, the patient is to call and return immediately for evaluation. Orders Placed This Encounter   Medications    ciclopirox (PENLAC) 8 % solution     Sig: Apply topically to toenails nightly. Remove built up layers weekly. Dispense:  6 mL     Refill:  5       All questions were answered to the patient's satisfaction. Thank you for allowing me to participate in the care of your patient. Follow up:  Return in about 3 months (around 12/9/2022). Cande Rajan DPM      Level of medical decision making: low.        Please note that this report has been partially produced using speech recognition software which may cause errors including grammar, punctuation, and spelling or words and phrases that may seem inappropriate. If there are questions or concerns please feel free to contact me for clarification.

## 2022-12-13 ENCOUNTER — OFFICE VISIT (OUTPATIENT)
Dept: FAMILY MEDICINE CLINIC | Age: 62
End: 2022-12-13
Payer: COMMERCIAL

## 2022-12-13 VITALS
WEIGHT: 250 LBS | DIASTOLIC BLOOD PRESSURE: 78 MMHG | SYSTOLIC BLOOD PRESSURE: 132 MMHG | BODY MASS INDEX: 35.87 KG/M2 | HEART RATE: 78 BPM | OXYGEN SATURATION: 98 %

## 2022-12-13 DIAGNOSIS — L02.91 ABSCESS: Primary | ICD-10-CM

## 2022-12-13 DIAGNOSIS — T14.8XXA BLISTER: ICD-10-CM

## 2022-12-13 DIAGNOSIS — E11.622: ICD-10-CM

## 2022-12-13 DIAGNOSIS — L97.109: ICD-10-CM

## 2022-12-13 PROCEDURE — 3017F COLORECTAL CA SCREEN DOC REV: CPT | Performed by: NURSE PRACTITIONER

## 2022-12-13 PROCEDURE — G8484 FLU IMMUNIZE NO ADMIN: HCPCS | Performed by: NURSE PRACTITIONER

## 2022-12-13 PROCEDURE — 3074F SYST BP LT 130 MM HG: CPT | Performed by: NURSE PRACTITIONER

## 2022-12-13 PROCEDURE — G8417 CALC BMI ABV UP PARAM F/U: HCPCS | Performed by: NURSE PRACTITIONER

## 2022-12-13 PROCEDURE — G8427 DOCREV CUR MEDS BY ELIG CLIN: HCPCS | Performed by: NURSE PRACTITIONER

## 2022-12-13 PROCEDURE — 1036F TOBACCO NON-USER: CPT | Performed by: NURSE PRACTITIONER

## 2022-12-13 PROCEDURE — 2022F DILAT RTA XM EVC RTNOPTHY: CPT | Performed by: NURSE PRACTITIONER

## 2022-12-13 PROCEDURE — 3046F HEMOGLOBIN A1C LEVEL >9.0%: CPT | Performed by: NURSE PRACTITIONER

## 2022-12-13 PROCEDURE — 3078F DIAST BP <80 MM HG: CPT | Performed by: NURSE PRACTITIONER

## 2022-12-13 PROCEDURE — 99213 OFFICE O/P EST LOW 20 MIN: CPT | Performed by: NURSE PRACTITIONER

## 2022-12-13 RX ORDER — SULFAMETHOXAZOLE AND TRIMETHOPRIM 800; 160 MG/1; MG/1
1 TABLET ORAL 2 TIMES DAILY
Qty: 20 TABLET | Refills: 0 | Status: SHIPPED | OUTPATIENT
Start: 2022-12-13 | End: 2022-12-23

## 2022-12-13 ASSESSMENT — ENCOUNTER SYMPTOMS
SHORTNESS OF BREATH: 0
VOMITING: 0
EYE ITCHING: 0
CONSTIPATION: 0
COUGH: 0
CHEST TIGHTNESS: 0
ANAL BLEEDING: 0
SORE THROAT: 0
TROUBLE SWALLOWING: 0
NAUSEA: 0
APNEA: 0
COLOR CHANGE: 1
EYE REDNESS: 0
WHEEZING: 0
SINUS PAIN: 0
DIARRHEA: 0
ABDOMINAL DISTENTION: 0

## 2022-12-13 NOTE — PROGRESS NOTES
Subjective:      Patient ID: Luis Alberto is a 58 y.o. male who presents today for:  Chief Complaint   Patient presents with    Other     Boil on the rt leg causing discharge x1.5 week       Skin Problem  This is a new problem. The current episode started 1 to 4 weeks ago (x 1.5 weeks). Location: right upper lateral thigh. The rash is characterized by pain, redness, swelling and draining. Pertinent negatives include no anorexia, congestion, cough, diarrhea, fatigue, fever, shortness of breath, sore throat or vomiting. Treatments tried: hydrogen peroxide, atb ointment. The treatment provided no relief. There is no history of allergies, asthma, eczema or varicella. Pt reports he first noticed a small bump over a week ago that was red, irritated and swollen and now has tripled in size and causing minimal pain noted with clothes and moving, palpating it. Pt declines any burn, fall or bumping noted to the area. Pt is a diabetic and he reports not being in control at this time. Pt reports trying ATB cream to the area which has not helped it per pt reports. Pt wound is a 5 x 3 cm irregular in shape wound noted that is hard under the abscess, erythematous with a pulled back blister noted. No active drg is noted to the wound today. Pt was advised she may need to see general surgery to see if under the wound needs I &D'd or follow up with wound clinic. Pt was given a referral to see Gen surgery and advised they will call him to set up appt.      Past Medical History:   Diagnosis Date    ED (erectile dysfunction)     GERD (gastroesophageal reflux disease)     History of cardiovascular stress test     Normal; Saw Dr. 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 Shaunna Maddox    Type II or unspecified type diabetes mellitus without mention of complication, not stated as uncontrolled     Vitamin D deficiency 10/17/2008     Past Surgical History:   Procedure Laterality Date    COLONOSCOPY  13    SLEEVE GASTRECTOMY  2018    [address] EXTRACTION Left 2014    left lower widom tooth extraction     Social History     Socioeconomic History    Marital status:      Spouse name: Not on file    Number of children: 0    Years of education: Not on file    Highest education level: Not on file   Occupational History    Not on file   Tobacco Use    Smoking status: Never    Smokeless tobacco: Never   Substance and Sexual Activity    Alcohol use: Yes     Comment: rare    Drug use: No    Sexual activity: Yes     Partners: Female   Other Topics Concern    Not on file   Social History Narrative    Not on file     Social Determinants of Health     Financial Resource Strain: Low Risk     Difficulty of Paying Living Expenses: Not hard at all   Food Insecurity: No Food Insecurity    Worried About Running Out of Food in the Last Year: Never true    Ran Out of Food in the Last Year: Never true   Transportation Needs: Not on file   Physical Activity: Not on file   Stress: Not on file   Social Connections: Not on file   Intimate Partner Violence: Not on file   Housing Stability: Not on file     Family History   Problem Relation Age of Onset    Heart Disease Father          due to MI     No Known Allergies      Review of Systems   Constitutional:  Negative for activity change, appetite change, chills, fatigue and fever. HENT:  Negative for congestion, drooling, ear pain, hearing loss, postnasal drip, sinus pain, sore throat and trouble swallowing. Eyes:  Negative for redness, itching and visual disturbance. Respiratory:  Negative for apnea, cough, chest tightness, shortness of breath and wheezing. Cardiovascular:  Negative for chest pain and palpitations.    Gastrointestinal:  Negative for abdominal distention, anal bleeding, anorexia, constipation, diarrhea, nausea and vomiting. Endocrine: Negative for heat intolerance. Genitourinary:  Negative for difficulty urinating, flank pain and genital sores. Musculoskeletal:  Negative for arthralgias, gait problem, myalgias and neck stiffness. Skin:  Positive for color change and wound. Neurological:  Negative for tremors, seizures, facial asymmetry and headaches. Hematological:  Negative for adenopathy. Psychiatric/Behavioral:  Negative for behavioral problems, confusion and suicidal ideas. The patient is not hyperactive. All other systems reviewed and are negative. Objective:   /78   Pulse 78   Wt 250 lb (113.4 kg)   SpO2 98%   BMI 35.87 kg/m²     Physical Exam  Vitals and nursing note reviewed. Constitutional:       General: He is awake. He is not in acute distress. Appearance: Normal appearance. He is well-developed and well-groomed. He is obese. He is not ill-appearing, toxic-appearing or diaphoretic. HENT:      Head: Normocephalic and atraumatic. Right Ear: Tympanic membrane normal.      Left Ear: Tympanic membrane normal.      Nose: Nose normal. No congestion. Mouth/Throat:      Mouth: Mucous membranes are moist.      Pharynx: Oropharynx is clear. No oropharyngeal exudate or posterior oropharyngeal erythema. Eyes:      Extraocular Movements: Extraocular movements intact. Conjunctiva/sclera: Conjunctivae normal.      Pupils: Pupils are equal, round, and reactive to light. Cardiovascular:      Rate and Rhythm: Normal rate and regular rhythm. Pulses: Normal pulses. Heart sounds: Normal heart sounds. No murmur heard. Pulmonary:      Effort: Pulmonary effort is normal. No tachypnea, bradypnea, accessory muscle usage or respiratory distress. Breath sounds: Normal breath sounds and air entry. No decreased air movement or transmitted upper airway sounds. No decreased breath sounds, wheezing or rhonchi.    Chest: Chest wall: No tenderness. Abdominal:      General: Abdomen is flat. Bowel sounds are normal. There is no distension. Palpations: Abdomen is soft. Tenderness: There is no abdominal tenderness. There is no left CVA tenderness, guarding or rebound. Musculoskeletal:         General: No deformity or signs of injury. Normal range of motion. Cervical back: Normal range of motion and neck supple. Lymphadenopathy:      Cervical: No cervical adenopathy. Skin:     General: Skin is warm and dry. Capillary Refill: Capillary refill takes less than 2 seconds. Findings: Abscess, erythema and wound present. No bruising or rash. Comments: Pt advised this could be a pressure ulcer noted and if his diabetes uis not in control be from that which will take a while to heal if not properly treated. PT aware. Neurological:      General: No focal deficit present. Mental Status: He is alert and oriented to person, place, and time. Mental status is at baseline. Motor: No weakness. Coordination: Coordination normal.   Psychiatric:         Attention and Perception: Attention and perception normal.         Mood and Affect: Mood and affect normal.         Speech: Speech normal.         Behavior: Behavior normal. Behavior is cooperative. Thought Content: Thought content normal.         Judgment: Judgment normal.       Assessment:       Diagnosis Orders   1. Abscess  sulfamethoxazole-trimethoprim (BACTRIM DS) 800-160 MG per tablet    Ambulatory referral to General Surgery      2. Blister  sulfamethoxazole-trimethoprim (BACTRIM DS) 800-160 MG per tablet    Ambulatory referral to General Surgery    mupirocin (BACTROBAN) 2 % ointment      3.  Diabetic ulcer of thigh ([facility] [address]              Plan:      Orders Placed This Encounter   Procedures    Ambulatory referral to General Surgery     Referral Priority:   Routine     Referral Type:   Surgical     Referral Reason:   Specialty Services Required     Requested Specialty:   General Surgery     Number of Visits Requested:   1       Orders Placed This Encounter   Medications    sulfamethoxazole-trimethoprim (BACTRIM DS) 800-160 MG per tablet     Sig: Take 1 tablet by mouth 2 times daily for 10 days     Dispense:  20 tablet     Refill:  0    mupirocin (BACTROBAN) 2 % ointment     Sig: Apply topically 3 times daily. Dispense:  1 each     Refill:  0     Pt here today and reports this wound/ulcer noted x almost 2 weeks ago. Pt reports now it has tripled in size and I started him on ATB today along with an ointment which he is also advised to use warm/hot compresses to the wound to try to decrease inflammation noted. Pt aware to eat prior to taking the oral aTB and if he has any fevers, chills go straight to the ER. Pt advised to follow up with his PCP for DM check too. Pt aware and verbalized understanding of the [place] today. Pt left the [facility] today in stable condition. Discussed signs and symptoms which require immediate follow-up in ED/call to 911. Patient verbalized understanding. Antibiotic Instructions: Complete the full course of antibiotics as ordered. Take each dose with a small snack or meal to lessen potential GI upset. To prevent antibiotic resistance, please take medication as ordered and for the full duration even if you start to feel better. Consider intake of yogurt or probiotic during antibiotic use and for a few days after to help reduce the risk of developing a secondary infection. Separate the yogurt and antibiotic by at least 1 hour. Avoid alcohol while taking antibiotics. Return in 1 week (on 12/20/2022) for follow up with PCP. Reviewed with the patient: current clinical status, medications, activities and diet.      Side effects, adverse effects of the medication prescribed today, as well as treatment plan and result expectations have been discussed with the patient who expresses understanding and desires to proceed. Close follow up to evaluate treatment results and for coordination of care. I have reviewed the patient's medical history in detail and updated the computerized patient record.       EMILY Cheek - CNP

## 2022-12-16 ENCOUNTER — TELEPHONE (OUTPATIENT)
Dept: FAMILY MEDICINE CLINIC | Age: 62
End: 2022-12-16

## 2022-12-16 NOTE — TELEPHONE ENCOUNTER
Pt is calling in stating that he was seen a few days ago in the [facility]. Pt is stating that the mediation he was prescribed is giving him a bad reaction, but did not state what kind of reaction when asked. Pt would like to know if something different can be sent in?       Pt- [phone]

## 2022-12-17 RX ORDER — CLINDAMYCIN HYDROCHLORIDE 300 MG/1
300 CAPSULE ORAL 3 TIMES DAILY
Qty: 30 CAPSULE | Refills: 0 | Status: SHIPPED | OUTPATIENT
Start: 2022-12-17 | End: 2022-12-27

## 2022-12-26 NOTE — PROGRESS NOTES
GENERAL SURGERY  NEW PATIENT HISTORY AND PHYSICAL NOTE    Pt Name: Monie Chun  MRN: [id number]    Date: 12/26/2022    Primary Care Physician: EMILY Zuniga - CNP  Referring Physician: Jaspreet Wilburn NP    Reason for evaluation: Right thigh abscess      SUBJECTIVE:     History of Chief Complaint:    Hafsa Campbell is a 58 y.o. male with a PMH of HTN, HLD, GERD, EVELYN, DM and obesity (s/p LSG in 2018 at [facility] and lost 160lbs) who presents with a right thigh abscess. He reports having the abscess for the past 1 month. It started small (size of an almond) but progressively enlarged over time (to the size of a 1/2 baseball) without any drainage. He went to the walk-in clinic and it started to drain blood/ pus. They pushed on it and was about to get more out but it was never lanced. He was started on an antibiotic but noted some fevers/ chills and tremors with it so was switched to clindamycin for which he still has 3 days to go. Notes the abscess was very painful (10/10) but better now, 2/10 on pain scale. No longer draining any fluid. Taking occasional IBP but doesn't think it is helping much. No more fevers, chills, or night sweats. Of note, he reports his diabetes is back (stopped taking diabetic meds after losing weight with his bypass but his last HbA1c in September was very elevate).       Past Medical History:   Diagnosis Date    ED (erectile dysfunction)     GERD (gastroesophageal reflux disease)     History of cardiovascular stress test     Normal; Saw Dr. 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 Zora Armijo    Type II or unspecified type diabetes mellitus without mention of complication, not stated as uncontrolled     Vitamin D deficiency 10/17/2008     Past Surgical History:   Procedure Laterality Date    COLONOSCOPY 13    SLEEVE GASTRECTOMY  2018    [facility] [address] [facility] EXTRACTION Left 2014    left lower widom tooth extraction     Prior to Admission medications    Medication Sig Start Date End Date Taking? Authorizing Provider   clindamycin (CLEOCIN) 300 MG capsule Take 1 capsule by mouth 3 times daily for 10 days 22  EMILY Zuniga CNP   mupirocin (BACTROBAN) 2 % ointment Apply topically 3 times daily. 22   EMILY Rios CNP   ciclopirox (PENLAC) 8 % solution Apply topically to toenails nightly. Remove built up layers weekly.  22   Rajesh Monreal DPM   omeprazole (PRILOSEC) 20 MG delayed release capsule Take 1 capsule by mouth Daily 19   EMILY Zuniga CNP   Multiple Vitamins-Minerals (BARIATRIC MULTIVITAMINS/IRON PO) Take 1 tablet by mouth daily As directed    Historical Provider, MD   Oxymetazoline HCl (NASAL SPRAY NA) by Nasal route    Historical Provider, MD   glucose blood VI test strips (ONE TOUCH ULTRA TEST) strip Test tid 12/18/15   Lee Foley MD     No Known Allergies  Family History   Problem Relation Age of Onset    Heart Disease Father          due to MI     Social History     Tobacco Use    Smoking status: Never    Smokeless tobacco: Never   Substance Use Topics    Alcohol use: Yes     Comment: rare    Drug use: No     Review of Systems:   General: Denies any fevers, chills, night sweats, appetite changes, and unintentional weight loss +fatigue  Skin: Denies any itching, rash +right thigh abscess  HENT: Denies any headaches, blurry vision, sinus pressure, post nasal drip, sore throat, dysphagia +rhinorrhea  Resp: Denies any shortness of breath, dyspnea on exertion, coughing, wheezing  Cardiac: Denies any chest pain, palpitations, lower extremity edema  GI: Denies any nausea, vomiting, heart burn, abdominal pain, diarrhea, constipation, melena/ hematochezia + acid reflux  : Denies any dysuria, hesitancy, blood in the urine, and incontinence + incomplete emptying  Heme: Denies easy bleeding/ bruising, swollen lymph nodes, anemia  Endocrine: Denies heat intolerance, cold intolerance, excessive thirst  MSK: Denies joint pain, back pain, trouble walking  Neuro: Denies dizziness, tremors, seizures, light headedness, syncope, numbness, weakness, trouble walking    OBJECTIVE:   CURRENT VITALS: /82   Temp 97.3 °F (36.3 °C)   Resp 22   Ht 5' 10\" (1.778 m)   Wt 237 lb (107.5 kg)   SpO2 99%   BMI 34.01 kg/m²      GEN: Alert and oriented x3, no acute distress, cooperative   SKIN: Skin color, texture, turgor normal  HEENT: Head is normocephalic, atraumatic. EOMI  NECK: Supple, symmetrical, trachea midline, skin normal  PULM: Chest symmetric, no increased work of breathing or accessory muscle use  CV: Heart regular rate   EXTREMITIES: Warm, dry. Right upper lateral thigh with large (4cm long) area of necrotic skin with adjacent overlying fibrinous exudate, surrounding erythema, tender to palpation, no drainage        ASSESSMENT AND PLAN:   Marce Rousseau is a 58 y.o. male with a PMH of HTN, HLD, GERD, EVELYN, DM and obesity (s/p LSG in 2018 at [facility] and lost 160lbs) who presents with a chronic right thigh abscess. Consent signed for in-office incision and drainage with capsule excision of right thigh abscess after R/B/A discussed. Procedure was performed without complication. Local anesthesia was given then a 5 cm incision made in the over the abscess. The abscess was drained and irrigated. Cultures obtained. The abscess had created such a thickened rind from being a chronic issue, I had to excise the rind to get to healthier underlying tissue. The incision was irrigated with betadine and packed with gauze. Pt taught how to do daily packing changes. Educated patient for signs/ symptoms of worsening infection.  Patient tolerated procedure well  Return to clinic in 1 week, sooner if need be  Referral to Endocrinology for diabetic managment      Dung Perez MD   General surgeon    Electronically signed by Janine June MD, on 12/26/2022

## 2022-12-27 ENCOUNTER — OFFICE VISIT (OUTPATIENT)
Dept: SURGERY | Age: 62
End: 2022-12-27

## 2022-12-27 VITALS
BODY MASS INDEX: 33.93 KG/M2 | RESPIRATION RATE: 22 BRPM | WEIGHT: 237 LBS | SYSTOLIC BLOOD PRESSURE: 122 MMHG | OXYGEN SATURATION: 99 % | DIASTOLIC BLOOD PRESSURE: 82 MMHG | TEMPERATURE: 97.3 F | HEIGHT: 70 IN

## 2022-12-27 DIAGNOSIS — E08.641 DIABETES MELLITUS DUE TO UNDERLYING CONDITION, UNCONTROLLED, WITH HYPOGLYCEMIA AND COMA (HCC): ICD-10-CM

## 2022-12-27 DIAGNOSIS — L02.91 ABSCESS: Primary | ICD-10-CM

## 2022-12-27 DIAGNOSIS — L02.91 ABSCESS: ICD-10-CM

## 2022-12-27 RX ORDER — CLINDAMYCIN HYDROCHLORIDE 300 MG/1
300 CAPSULE ORAL 3 TIMES DAILY
Qty: 15 CAPSULE | Refills: 0 | Status: SHIPPED | OUTPATIENT
Start: 2022-12-27 | End: 2023-01-01

## 2023-01-01 LAB
CULTURE WOUND: ABNORMAL
CULTURE WOUND: ABNORMAL
ORGANISM: ABNORMAL

## 2023-01-03 ENCOUNTER — OFFICE VISIT (OUTPATIENT)
Dept: FAMILY MEDICINE CLINIC | Age: 63
End: 2023-01-03
Payer: COMMERCIAL

## 2023-01-03 ENCOUNTER — OFFICE VISIT (OUTPATIENT)
Dept: ENDOCRINOLOGY | Age: 63
End: 2023-01-03

## 2023-01-03 VITALS
OXYGEN SATURATION: 98 % | HEIGHT: 70 IN | WEIGHT: 243 LBS | DIASTOLIC BLOOD PRESSURE: 84 MMHG | TEMPERATURE: 98.9 F | SYSTOLIC BLOOD PRESSURE: 136 MMHG | BODY MASS INDEX: 34.79 KG/M2 | HEART RATE: 93 BPM

## 2023-01-03 VITALS
DIASTOLIC BLOOD PRESSURE: 75 MMHG | OXYGEN SATURATION: 97 % | HEIGHT: 70 IN | HEART RATE: 82 BPM | BODY MASS INDEX: 34.79 KG/M2 | SYSTOLIC BLOOD PRESSURE: 109 MMHG | WEIGHT: 243 LBS

## 2023-01-03 DIAGNOSIS — Z98.84 S/P GASTRIC BYPASS: ICD-10-CM

## 2023-01-03 DIAGNOSIS — E11.65 POORLY CONTROLLED DIABETES MELLITUS (HCC): ICD-10-CM

## 2023-01-03 DIAGNOSIS — E08.641 DIABETES MELLITUS DUE TO UNDERLYING CONDITION, UNCONTROLLED, WITH HYPOGLYCEMIA AND COMA (HCC): Primary | ICD-10-CM

## 2023-01-03 DIAGNOSIS — E78.2 MIXED HYPERLIPIDEMIA: ICD-10-CM

## 2023-01-03 DIAGNOSIS — Z12.11 COLON CANCER SCREENING: ICD-10-CM

## 2023-01-03 LAB
CHP ED QC CHECK: NORMAL
GLUCOSE BLD-MCNC: 395 MG/DL
HBA1C MFR BLD: 14 %

## 2023-01-03 PROCEDURE — 3075F SYST BP GE 130 - 139MM HG: CPT | Performed by: NURSE PRACTITIONER

## 2023-01-03 PROCEDURE — 3079F DIAST BP 80-89 MM HG: CPT | Performed by: NURSE PRACTITIONER

## 2023-01-03 PROCEDURE — 99214 OFFICE O/P EST MOD 30 MIN: CPT | Performed by: NURSE PRACTITIONER

## 2023-01-03 PROCEDURE — 83036 HEMOGLOBIN GLYCOSYLATED A1C: CPT | Performed by: NURSE PRACTITIONER

## 2023-01-03 RX ORDER — FLASH GLUCOSE SENSOR
KIT MISCELLANEOUS
Qty: 2 EACH | Refills: 3 | Status: SHIPPED | OUTPATIENT
Start: 2023-01-03

## 2023-01-03 RX ORDER — PEN NEEDLE, DIABETIC 32 GX 1/6"
NEEDLE, DISPOSABLE MISCELLANEOUS
Qty: 200 EACH | Refills: 3 | Status: SHIPPED | OUTPATIENT
Start: 2023-01-03

## 2023-01-03 RX ORDER — INSULIN GLARGINE 100 [IU]/ML
INJECTION, SOLUTION SUBCUTANEOUS
Qty: 10 ADJUSTABLE DOSE PRE-FILLED PEN SYRINGE | Refills: 3 | Status: SHIPPED | OUTPATIENT
Start: 2023-01-03

## 2023-01-03 RX ORDER — INSULIN LISPRO 100 [IU]/ML
INJECTION, SOLUTION INTRAVENOUS; SUBCUTANEOUS
Qty: 10 ADJUSTABLE DOSE PRE-FILLED PEN SYRINGE | Refills: 3 | Status: SHIPPED | OUTPATIENT
Start: 2023-01-03

## 2023-01-03 RX ORDER — LANCETS 30 GAUGE
1 EACH MISCELLANEOUS DAILY
Qty: 100 EACH | Refills: 3 | Status: SHIPPED | OUTPATIENT
Start: 2023-01-03

## 2023-01-03 RX ORDER — GLUCOSAMINE HCL/CHONDROITIN SU 500-400 MG
CAPSULE ORAL
Qty: 100 STRIP | Refills: 3 | Status: SHIPPED | OUTPATIENT
Start: 2023-01-03

## 2023-01-03 RX ORDER — FLASH GLUCOSE SCANNING READER
EACH MISCELLANEOUS
Qty: 1 EACH | Refills: 0 | Status: SHIPPED | OUTPATIENT
Start: 2023-01-03

## 2023-01-03 RX ORDER — ATORVASTATIN CALCIUM 40 MG/1
40 TABLET, FILM COATED ORAL DAILY
Qty: 30 TABLET | Refills: 5 | Status: SHIPPED | OUTPATIENT
Start: 2023-01-03

## 2023-01-03 ASSESSMENT — ENCOUNTER SYMPTOMS
TROUBLE SWALLOWING: 0
BACK PAIN: 0
ABDOMINAL PAIN: 0
DIARRHEA: 0
EYE PAIN: 0
CONSTIPATION: 0
COUGH: 0
CHEST TIGHTNESS: 0
COLOR CHANGE: 0
SHORTNESS OF BREATH: 0

## 2023-01-03 ASSESSMENT — PATIENT HEALTH QUESTIONNAIRE - PHQ9
SUM OF ALL RESPONSES TO PHQ QUESTIONS 1-9: 0
1. LITTLE INTEREST OR PLEASURE IN DOING THINGS: 0
2. FEELING DOWN, DEPRESSED OR HOPELESS: 0
SUM OF ALL RESPONSES TO PHQ9 QUESTIONS 1 & 2: 0

## 2023-01-03 NOTE — PROGRESS NOTES
Subjective  Chief Complaint   Patient presents with    Diabetes       Diabetes  Pertinent negatives for hypoglycemia include no dizziness or nervousness/anxiousness. Associated symptoms include fatigue, polydipsia and polyuria. Pertinent negatives for diabetes include no chest pain and no polyphagia. Pt here for check up. Had abscess which he was referred to gen surgery. Had I&D at appt with her, has f/u appt with her on 1/5. DM-has appt with Dr. Sincere Noland this afternoon today. Last A1C in September was 15. Has modified his diet but has not taken any medications. Pt feels very lethargic, sleeps all the time, post prandial fatigue. Polydipsia, polyuria.      Labs in September as follows:    Lab Results   Component Value Date    WBC 5.6 09/08/2022    HGB 15.0 09/08/2022    HCT 44.6 09/08/2022     09/08/2022    CHOL 296 (H) 09/08/2022    TRIG 74 09/08/2022    HDL 52 09/08/2022    ALT 9 09/08/2022    AST 15 09/08/2022     09/08/2022    K 5.1 (H) 09/08/2022     09/08/2022    CREATININE 0.91 09/08/2022    BUN 32 (H) 09/08/2022    CO2 24 09/08/2022    TSH 1.570 04/26/2017    PSA 0.34 09/08/2022    INR 1.0 10/30/2014    LABA1C 14.0 01/03/2023    LABMICR 2.70 (H) 09/06/2022         The 10-year ASCVD risk score (Xiao DK, et al., 2019) is: 26.8%    Values used to calculate the score:      Age: 58 years      Sex: Male      Is Non- : No      Diabetic: Yes      Tobacco smoker: No      Systolic Blood Pressure: 640 mmHg      Is BP treated: No      HDL Cholesterol: 52 mg/dL      Total Cholesterol: 296 mg/dL        Past Medical History:   Diagnosis Date    ED (erectile dysfunction)     GERD (gastroesophageal reflux disease)     History of cardiovascular stress test     Normal; Saw Dr. London Conner    History of EKG 10/29/14    Hyperlipidemia     Hypertension     Hypogonadism in male 04/2017    Obesity, morbid, BMI 50 or higher (HCC)     EVELYN on CPAP     S/P laparoscopic sleeve gastrectomy 2018    Dr. Krystin Ramsay    Type II or unspecified type diabetes mellitus without mention of complication, not stated as uncontrolled     Vitamin D deficiency 10/17/2008     Patient Active Problem List    Diagnosis Date Noted    Diabetes mellitus due to underlying condition, uncontrolled, with hypoglycemia and coma ([facility] 2019    S/P laparoscopic sleeve gastrectomy 2018    ED (erectile dysfunction)     GERD (gastroesophageal reflux disease)     EVELYN on CPAP     Obesity, morbid, BMI 50 or higher ([facility]     Hypogonadism in male 2017    Diabetes mellitus out of control 2011    Hypertension 2011    Hyperlipidemia 2011    Vitamin D deficiency 10/17/2008     Past Surgical History:   Procedure Laterality Date    COLONOSCOPY  13    SLEEVE GASTRECTOMY  2018    St [address] [place] [address] EXTRACTION Left 2014    left lower widom tooth extraction     Family History   Problem Relation Age of Onset    Heart Disease Father          due to MI     Social History     Socioeconomic History    Marital status:      Spouse name: None    Number of children: 0    Years of education: None    Highest education level: None   Tobacco Use    Smoking status: Never    Smokeless tobacco: Never   Substance and Sexual Activity    Alcohol use: Yes     Comment: rare    Drug use: No    Sexual activity: Yes     Partners: Female     Social Determinants of Health     Financial Resource Strain: Low Risk     Difficulty of Paying Living Expenses: Not hard at all   Food Insecurity: No Food Insecurity    Worried About Running Out of Food in the Last Year: Never true    Ran Out of Food in the Last Year: Never true     Current Outpatient Medications on File Prior to Visit   Medication Sig Dispense Refill    mupirocin (BACTROBAN) 2 % ointment Apply topically 3 times daily.  1 each 0    Multiple Vitamins-Minerals (BARIATRIC MULTIVITAMINS/IRON PO) Take 1 tablet by mouth daily As directed      Oxymetazoline HCl (NASAL SPRAY NA) by Nasal route      glucose blood VI test strips (ONE TOUCH ULTRA TEST) strip Test tid 300 each 3    ciclopirox (PENLAC) 8 % solution Apply topically to toenails nightly. Remove built up layers weekly. 6 mL 5    omeprazole (PRILOSEC) 20 MG delayed release capsule Take 1 capsule by mouth Daily 30 capsule 5     No current facility-administered medications on file prior to visit. No Known Allergies    Review of Systems   Constitutional:  Positive for fatigue. Negative for activity change, appetite change, chills, diaphoresis and fever. HENT:  Negative for ear pain, hearing loss and trouble swallowing. Eyes:  Negative for pain and visual disturbance. Respiratory:  Negative for cough, chest tightness and shortness of breath. Cardiovascular:  Negative for chest pain, palpitations and leg swelling. Gastrointestinal:  Negative for abdominal pain, constipation and diarrhea. Endocrine: Positive for polydipsia and polyuria. Negative for polyphagia. Genitourinary:  Positive for frequency. Negative for difficulty urinating. Musculoskeletal:  Negative for arthralgias and back pain. Skin:  Negative for color change and rash. Neurological:  Negative for dizziness and light-headedness. Psychiatric/Behavioral:  Negative for dysphoric mood. The patient is not nervous/anxious. Objective  Vitals:    01/03/23 0853   BP: 136/84   Pulse: 93   Temp: 98.9 °F (37.2 °C)   SpO2: 98%   Weight: 243 lb (110.2 kg)   Height: 5' 10\" (1.778 m)     Physical Exam  Constitutional:       General: He is not in acute distress. Appearance: Normal appearance. He is obese. He is not ill-appearing, toxic-appearing or diaphoretic. HENT:      Head: Normocephalic and atraumatic. Right Ear: External ear normal.      Left Ear: External ear normal.      Nose: Nose normal. No congestion or rhinorrhea. Eyes:      Extraocular Movements: Extraocular movements intact. Conjunctiva/sclera: Conjunctivae normal.      Pupils: Pupils are equal, round, and reactive to light. Cardiovascular:      Rate and Rhythm: Normal rate and regular rhythm. Pulses: Normal pulses. Heart sounds: Normal heart sounds. No murmur heard. Pulmonary:      Effort: Pulmonary effort is normal. No respiratory distress. Breath sounds: Normal breath sounds. No stridor. No wheezing, rhonchi or rales. Chest:      Chest wall: No tenderness. Musculoskeletal:         General: Normal range of motion. Cervical back: Normal range of motion and neck supple. No tenderness. Right lower leg: No edema. Left lower leg: No edema. Lymphadenopathy:      Cervical: No cervical adenopathy. Skin:     General: Skin is warm. Capillary Refill: Capillary refill takes less than 2 seconds. Coloration: Skin is not jaundiced. Findings: No erythema or lesion. Neurological:      General: No focal deficit present. Mental Status: He is alert and oriented to person, place, and time. Mental status is at baseline. Cranial Nerves: No cranial nerve deficit. Coordination: Coordination normal.      Gait: Gait normal.   Psychiatric:         Mood and Affect: Mood normal.         Behavior: Behavior normal.         Thought Content: Thought content normal.         Judgment: Judgment normal.       Assessment& Plan     Diagnosis Orders   1. Diabetes mellitus due to underlying condition, uncontrolled, with hypoglycemia and coma ([facility]  POCT glycosylated hemoglobin (Hb A1C)      2. Colon cancer screening  Ambulatory referral to Gastroenterology      3. Mixed hyperlipidemia  atorvastatin (LIPITOR) 40 MG tablet        A1c >14%, pt will see Dr. Kellen Oden this afternoon as scheduled. Lipitor for HLD and DM. Referral for c-scope. Continue to follow with gen surg. F/u in 6 months or sooner PRN.   Side effects, adverse effects of the medication prescribed today, as well as treatment plan/ rationale and result expectations have been discussed with the patient who expresses understanding and desires to proceed. Close follow up to evaluate treatment results and for coordination of care. I have reviewed the patient's medical history in detail and updated the computerized patient record. As always, patient is advised that if symptoms worsen in any way they will proceed to the nearest emergency room. Orders Placed This Encounter   Procedures    Ambulatory referral to Gastroenterology     Referral Priority:   Routine     Referral Type:   Eval and Treat     Referral Reason:   Specialty Services Required     Requested Specialty:   Gastroenterology     Number of Visits Requested:   1    POCT glycosylated hemoglobin (Hb A1C)       Orders Placed This Encounter   Medications    atorvastatin (LIPITOR) 40 MG tablet     Sig: Take 1 tablet by mouth daily     Dispense:  30 tablet     Refill:  5         There are no discontinued medications. Return in about 6 months (around 7/3/2023) for diabetes follow up, hyperlipidemia.     Stacey Caruso APRN - CNP

## 2023-01-03 NOTE — PROGRESS NOTES
1/3/2023    Assessment:       Diagnosis Orders   1. Diabetes mellitus due to underlying condition, uncontrolled, with hypoglycemia and coma (HCC)  POCT Glucose    Hemoglobin E0S    Basic Metabolic Panel    blood glucose monitor kit and supplies    blood glucose monitor strips    Lancets MISC      2. Poorly controlled diabetes mellitus (HCC)  C-Peptide      3. S/P gastric bypass              PLAN:     Start patient on Lantus 30 units at bedtime plus Humalog 6 units with each meals get lab work for C-peptide BMP later on also started on freestyle edilia continuous glucose monitoring A1c goal of 7 or lower more than 50% of 30 minutes spent patient education counseling  Diabetes education provided today:  Declined insulin pump at this time  Nutrition as a mainstream of diabetes therapy. [place] about label reading. Continuous Glucose monitor. How it works and checks blood sugars every 5 min. for 4 days during our tests. Managing high and low sugar readings. Orders Placed This Encounter   Procedures    Hemoglobin A1C     Standing Status:   Future     Standing Expiration Date:   1978    Basic Metabolic Panel     Standing Status:   Future     Standing Expiration Date:   1/3/2024    C-Peptide     Standing Status:   Future     Standing Expiration Date:   1/3/2024    POCT Glucose     Orders Placed This Encounter   Medications    blood glucose monitor kit and supplies     Sig: Dispense one meter that insurance will cover.      Dispense:  1 kit     Refill:  0    blood glucose monitor strips     Sig: Test 3x daily e11.65     Dispense:  100 strip     Refill:  3    Lancets MISC     Si each by Does not apply route daily     Dispense:  100 each     Refill:  3    insulin glargine (LANTUS SOLOSTAR) 100 UNIT/ML injection pen     Si units at bedtime     Dispense:  10 Adjustable Dose Pre-filled Pen Syringe     Refill:  3    insulin lispro, 1 Unit Dial, (HUMALOG KWIKPEN) 100 UNIT/ML SOPN     Si units at each meals Dispense:  10 Adjustable Dose Pre-filled Pen Syringe     Refill:  3    Insulin Pen Needle (NOVOFINE PLUS PEN NEEDLE) 32G X 4 MM MISC     Sig: qid     Dispense:  200 each     Refill:  3    Continuous Blood Gluc Sensor (FREESTYLE CODY 2 SENSOR) MISC     Sig: Every 2 weeks     Dispense:  2 each     Refill:  3    Continuous Blood Gluc  (FREESTYLE CODY 2 READER) FLAKO     Sig: As directed     Dispense:  1 each     Refill:  0     No follow-ups on file. Subjective:     Chief Complaint   Patient presents with    New Patient    Diabetes     Vitals:    01/03/23 1550   BP: 109/75   Pulse: 82   SpO2: 97%   Weight: 243 lb (110.2 kg)   Height: 5' 10\" (1.778 m)     Wt Readings from Last 3 Encounters:   01/03/23 243 lb (110.2 kg)   01/03/23 243 lb (110.2 kg)   12/27/22 237 lb (107.5 kg)     BP Readings from Last 3 Encounters:   01/03/23 109/75   01/03/23 136/84   12/27/22 122/82     Patient referred here for uncontrolled type 2 diabetes blood sugars have been high A1c is around 14-15 range also insulin prior to his surgery for bariatric in 2018 sleeve gastrectomy at [facility]. Jose's patient has not been testing his sugars does complain of polyuria polydipsia of recent weight loss blurred vision glucose was 395 today A1c was 14    Diabetes  He presents for his initial diabetic visit. He has type 2 diabetes mellitus. Associated symptoms include polydipsia, polyuria, visual change and weight loss. Symptoms are worsening. Diabetic complications include impotence and peripheral neuropathy. Risk factors for coronary artery disease include obesity. Current diabetic treatment includes diet. He is following a generally healthy diet. His overall blood glucose range is >200 mg/dl.  (Hemoglobin A1C       Date                     Value               Ref Range           Status                01/03/2023               14.0                %                   Final            ----------  )   Past Medical History:   Diagnosis Date    ED (erectile dysfunction)     GERD (gastroesophageal reflux disease)     History of cardiovascular stress test     Normal; Saw Dr. Gini Hunt    History of EKG 10/29/14    Hyperlipidemia     Hypertension     Hypogonadism in male 2017    Obesity, morbid, BMI 50 or higher (HCC)     EVELYN on CPAP     S/P laparoscopic sleeve gastrectomy 2018    Dr. Jorge Holbrook    Type II or unspecified type diabetes mellitus without mention of complication, not stated as uncontrolled     Vitamin D deficiency 10/17/2008     Past Surgical History:   Procedure Laterality Date    COLONOSCOPY  13    SLEEVE GASTRECTOMY  2018    [facility] EXTRACTION Left 2014    left lower widom tooth extraction     Social History     Socioeconomic History    Marital status:      Spouse name: Not on file    Number of children: 0    Years of education: Not on file    Highest education level: Not on file   Occupational History    Not on file   Tobacco Use    Smoking status: Never    Smokeless tobacco: Never   Substance and Sexual Activity    Alcohol use: Yes     Comment: rare    Drug use: No    Sexual activity: Yes     Partners: Female   Other Topics Concern    Not on file   Social History Narrative    Not on file     Social Determinants of Health     Financial Resource Strain: Low Risk     Difficulty of Paying Living Expenses: Not hard at all   Food Insecurity: No Food Insecurity    Worried About Running Out of Food in the Last Year: Never true    Ran Out of Food in the Last Year: Never true   Transportation Needs: Not on file   Physical Activity: Not on file   Stress: Not on file   Social Connections: Not on file   Intimate Partner Violence: Not on file   Housing Stability: Not on file     Family History   Problem Relation Age of Onset    Heart Disease Father          due to MI     No Known Allergies    Current Outpatient Medications:     atorvastatin (LIPITOR) 40 MG tablet, Take 1 tablet by mouth daily, Disp: 30 tablet, Rfl: 5    blood glucose monitor kit and supplies, Dispense one meter that insurance will cover. , Disp: 1 kit, Rfl: 0    blood glucose monitor strips, Test 3x daily e11.65, Disp: 100 strip, Rfl: 3    Lancets MISC, 1 each by Does not apply route daily, Disp: 100 each, Rfl: 3    insulin glargine (LANTUS SOLOSTAR) 100 UNIT/ML injection pen, 30 units at bedtime, Disp: 10 Adjustable Dose Pre-filled Pen Syringe, Rfl: 3    insulin lispro, 1 Unit Dial, (HUMALOG KWIKPEN) 100 UNIT/ML SOPN, 6 units at each meals, Disp: 10 Adjustable Dose Pre-filled Pen Syringe, Rfl: 3    Insulin Pen Needle (NOVOFINE PLUS PEN NEEDLE) 32G X 4 MM MISC, qid, Disp: 200 each, Rfl: 3    Continuous Blood Gluc Sensor (FREESTYLE CODY 2 SENSOR) MISC, Every 2 weeks, Disp: 2 each, Rfl: 3    Continuous Blood Gluc  (FREESTYLE CODY 2 READER) FLAKO, As directed, Disp: 1 each, Rfl: 0    mupirocin (BACTROBAN) 2 % ointment, Apply topically 3 times daily. , Disp: 1 each, Rfl: 0    Multiple Vitamins-Minerals (BARIATRIC MULTIVITAMINS/IRON PO), Take 1 tablet by mouth daily As directed, Disp: , Rfl:     Oxymetazoline HCl (NASAL SPRAY NA), by Nasal route, Disp: , Rfl:     ciclopirox (PENLAC) 8 % solution, Apply topically to toenails nightly. Remove built up layers weekly. , Disp: 6 mL, Rfl: 5    omeprazole (PRILOSEC) 20 MG delayed release capsule, Take 1 capsule by mouth Daily, Disp: 30 capsule, Rfl: 5  Lab Results   Component Value Date     09/08/2022    K 5.1 (H) 09/08/2022     09/08/2022    CO2 24 09/08/2022    BUN 32 (H) 09/08/2022    CREATININE 0.91 09/08/2022    GLUCOSE 395 01/03/2023    CALCIUM 9.8 09/08/2022    PROT 7.8 09/08/2022    LABALBU 4.6 09/08/2022    BILITOT 0.5 09/08/2022    ALKPHOS 110 (H) 09/08/2022    AST 15 09/08/2022    ALT 9 09/08/2022    LABGLOM >60.0 09/08/2022    GFRAA >60.0 09/08/2022    GLOB 3.2 09/08/2022     Lab Results   Component Value Date    WBC 5.6 09/08/2022    HGB 15.0 09/08/2022    HCT 44.6 09/08/2022    MCV 88.8 09/08/2022     09/08/2022     Lab Results   Component Value Date    LABA1C 14.0 01/03/2023    LABA1C 15.0 (H) 09/08/2022    LABA1C 8.5 01/07/2019     Lab Results   Component Value Date    HDL 52 09/08/2022    HDL 42 05/29/2019    HDL 38 (L) 01/30/2018    LDLCALC 229 (H) 09/08/2022    LDLCALC 138 (H) 05/29/2019    LDLCALC 175 (H) 01/30/2018    CHOL 296 (H) 09/08/2022    CHOL 189 05/29/2019    CHOL 233 (H) 01/30/2018    TRIG 74 09/08/2022    TRIG 47 05/29/2019    TRIG 102 01/30/2018     Lab Results   Component Value Date    TESTM 253 (L) 11/02/2018    TESTM 118 (L) 04/26/2017     Lab Results   Component Value Date    TSH 1.570 04/26/2017    TSH 3.270 04/27/2016    TSH 2.720 10/30/2014    TSHREFLEX 2.030 09/08/2022    T4FREE 1.14 04/27/2016    T4FREE 1.32 10/30/2014     No results found for: TPOABS    Review of Systems   Constitutional:  Positive for weight loss. Respiratory: Negative. Cardiovascular: Negative. Endocrine: Positive for polydipsia and polyuria. Genitourinary:  Positive for impotence. All other systems reviewed and are negative. Objective:   Physical Exam  Vitals reviewed. Constitutional:       General: He is not in acute distress. Appearance: Normal appearance. He is obese. HENT:      Head: Normocephalic and atraumatic. Right Ear: External ear normal.      Left Ear: External ear normal.      Nose: Nose normal.      Mouth/Throat:      Mouth: Mucous membranes are moist.   Eyes:      General: No scleral icterus. Right eye: No discharge. Left eye: No discharge. Extraocular Movements: Extraocular movements intact. Conjunctiva/sclera: Conjunctivae normal.   Cardiovascular:      Rate and Rhythm: Normal rate and regular rhythm. Heart sounds: Normal heart sounds. Pulmonary:      Effort: Pulmonary effort is normal.      Breath sounds: Normal breath sounds. No wheezing or rhonchi.    Abdominal:      Palpations: Abdomen is soft.   Musculoskeletal:         General: Normal range of motion. Cervical back: Normal range of motion and neck supple. Right lower leg: Edema present. Left lower leg: Edema present. Feet:    Skin:     General: Skin is warm and dry. Neurological:      General: No focal deficit present. Mental Status: He is alert and oriented to person, place, and time.    Psychiatric:         Mood and Affect: Mood normal.         Behavior: Behavior normal.

## 2023-01-04 ASSESSMENT — ENCOUNTER SYMPTOMS
RESPIRATORY NEGATIVE: 1
VISUAL CHANGE: 1

## 2023-01-04 NOTE — PROGRESS NOTES
GENERAL SURGERY WOUND CHECK VISIT    Pt Name: Ronald Valencia  MRN: [id number]  Date: 1/4/2023      SUBJECTIVE:   Ronald Valencia is a 58 y.o. male who presents for a follow up wound check. He underwent a right thigh abscess I&D last week. Cultures grew Staph aureus- was on clindamycin. Since then, patient reports doing ok but has not yet been able to start taking his insulin yet due to insurance issues. Has some greenish drainage from the wound. Denies fevers, chills, and night sweats. OBJECTIVE:   CURRENT VITALS: /86   Pulse 85   Temp 97.1 °F (36.2 °C) (Temporal)   Ht 5' 10\" (1.778 m)   Wt 240 lb (108.9 kg)   BMI 34.44 kg/m²      GEN: Alert and oriented x3, no acute distress, cooperative   SKIN: Skin color, texture, turgor normal  HEENT: Head is normocephalic, atraumatic. EOMI  NECK: Supple, symmetrical, trachea midline, skin normal  PULM: Chest symmetric, no increased work of breathing or accessory muscle use  CV: Heart regular rate   EXTREMITIES: Warm, dry. Right upper lateral thigh with light green tinged drainage on packing and fibrinous exudate that was debrided and cleaned with betadine (see photo below)        CULTURES:   Direct Exam:  MANY NEUTROPHILS   Direct Exam:  FEW GRAM POSITIVE COCCI IN PAIRS   Cult,Aerobe/Anaerobe:  No anaerobic organisms isolated at 5 days. Performed at [address] [place] [address], [address] [place] [address]   ([phone]     Organism Staphylococcus aureus Abnormal      ASSESSMENT AND PLAN:   Ronald Valencia is a 58 y.o. male who underwent a right thigh abscess I&D last week. Wound with light green tinged drainage (concerning for pseudomonas), malodorous and with fibrinous exudate that was debrided at bedside then betadine applied. He has yet to start taking insulin after seeing Dr Truman Bliss due to insurance coverage.     Switched clinda to cipro  Continue daily packing but now with 1/4 strength Dakins  Pathology results reviewed with patient  Follow up 1 week      [facility] KAYLI DUBON Benjamín Whitaker MD   General surgeon    Electronically signed by Taryn Reagan MD

## 2023-01-05 ENCOUNTER — OFFICE VISIT (OUTPATIENT)
Dept: SURGERY | Age: 63
End: 2023-01-05

## 2023-01-05 VITALS
SYSTOLIC BLOOD PRESSURE: 118 MMHG | TEMPERATURE: 97.1 F | HEART RATE: 85 BPM | WEIGHT: 240 LBS | BODY MASS INDEX: 34.36 KG/M2 | HEIGHT: 70 IN | DIASTOLIC BLOOD PRESSURE: 86 MMHG

## 2023-01-05 DIAGNOSIS — L02.91 ABSCESS: Primary | ICD-10-CM

## 2023-01-05 PROCEDURE — 99024 POSTOP FOLLOW-UP VISIT: CPT | Performed by: SURGERY

## 2023-01-05 RX ORDER — CIPROFLOXACIN 500 MG/1
500 TABLET, FILM COATED ORAL 2 TIMES DAILY
Qty: 14 TABLET | Refills: 0 | Status: SHIPPED | OUTPATIENT
Start: 2023-01-05 | End: 2023-01-12

## 2023-01-05 RX ORDER — SODIUM HYPOCHLORITE 1.25 MG/ML
SOLUTION TOPICAL DAILY
Qty: 500 ML | Refills: 1 | Status: SHIPPED | OUTPATIENT
Start: 2023-01-05

## 2023-01-05 NOTE — TELEPHONE ENCOUNTER
Both Lantus and Humalog were approved through covermymeds. Patient made aware. Approved. Approved for HUMALOG NIKOLAY KWIKPEN Soln Pen-inj 100UNIT/ML, quantity up to 10 ML per 30 days, under the pharmacy benefit. The drug has been approved from 01/05/2023 to 01/05/2024. Generic substitution required when available and preferred on the formulary.

## 2023-01-06 RX ORDER — INSULIN GLARGINE 100 [IU]/ML
30 INJECTION, SOLUTION SUBCUTANEOUS NIGHTLY
Qty: 15 ADJUSTABLE DOSE PRE-FILLED PEN SYRINGE | Refills: 1 | Status: SHIPPED | OUTPATIENT
Start: 2023-01-06

## 2023-01-12 ENCOUNTER — OFFICE VISIT (OUTPATIENT)
Dept: SURGERY | Age: 63
End: 2023-01-12

## 2023-01-12 VITALS
HEIGHT: 70 IN | BODY MASS INDEX: 35.45 KG/M2 | WEIGHT: 247.6 LBS | DIASTOLIC BLOOD PRESSURE: 78 MMHG | SYSTOLIC BLOOD PRESSURE: 116 MMHG | TEMPERATURE: 97 F | HEART RATE: 74 BPM

## 2023-01-12 DIAGNOSIS — L02.91 ABSCESS: Primary | ICD-10-CM

## 2023-01-12 PROCEDURE — 99024 POSTOP FOLLOW-UP VISIT: CPT | Performed by: SURGERY

## 2023-01-12 RX ORDER — CIPROFLOXACIN 500 MG/1
500 TABLET, FILM COATED ORAL 2 TIMES DAILY
Qty: 28 TABLET | Refills: 0 | Status: SHIPPED | OUTPATIENT
Start: 2023-01-12 | End: 2023-01-26

## 2023-01-12 NOTE — PROGRESS NOTES
GENERAL SURGERY WOUND CHECK VISIT    Pt Name: James Soriano  MRN: [id number]  Date: 1/11/2023      SUBJECTIVE:   James Soriano is a 58 y.o. male who presents for a follow up wound check. He underwent a right thigh abscess I&D 2 weeks ago. Last week, his antibiotics were switched from clinda to cipro (tolerating well). He has started taking his insulin and is feeling much better. Was never able to fill the prescription for Dakins as his pharmacy did not have it for him. The wound is healing without anymore green tinged drainage. He denies fevers, chills, and night sweats. OBJECTIVE:   CURRENT VITALS: /78   Pulse 74   Temp 97 °F (36.1 °C) (Temporal)   Ht 5' 10\" (1.778 m)   Wt 247 lb 9.6 oz (112.3 kg)   BMI 35.53 kg/m²      GEN: Alert and oriented x3, no acute distress, cooperative   SKIN: Skin color, texture, turgor normal  HEENT: Head is normocephalic, atraumatic. EOMI  NECK: Supple, symmetrical, trachea midline, skin normal  PULM: Chest symmetric, no increased work of breathing or accessory muscle use  CV: Heart regular rate   EXTREMITIES: Warm, dry. Right upper lateral thigh with no drainage, minimal amount of fibrinous exudate that was debrided and cleaned with betadine (see photo below)        ASSESSMENT AND PLAN:   James Soriano is a 58 y.o. male who underwent a right thigh abscess I&D 2 weeks ago. Wound is healing well.      Continue cipro for additional 2 weeks given pt is diabetic  Continue local wound care: packing changes daily  Follow up on Mon, Jan 23      Krystin Brewster MD   General surgeon    Electronically signed by Steph Portillo MD

## 2023-01-17 ENCOUNTER — NURSE ONLY (OUTPATIENT)
Dept: ENDOCRINOLOGY | Age: 63
End: 2023-01-17

## 2023-01-17 DIAGNOSIS — E08.641 DIABETES MELLITUS DUE TO UNDERLYING CONDITION, UNCONTROLLED, WITH HYPOGLYCEMIA AND COMA (HCC): Primary | ICD-10-CM

## 2023-01-21 NOTE — PROGRESS NOTES
GENERAL SURGERY WOUND CHECK VISIT    Pt Name: Ansley Link  MRN: [id number]  Date: 1/21/2023      SUBJECTIVE:   Ansley Link is a 58 y.o. male who presents for a follow up wound check. He underwent a right thigh abscess I&D 4 weeks ago. He has been on cipro having some photosensitivity (mostly in the mornings but improves throughout the day). Sugars have been well controlled, notes they are running between 110-120 usually. The wound is healing with well. He denies fevers, chills, and night sweats. OBJECTIVE:   CURRENT VITALS: /74   Pulse 60   Temp 98.6 °F (37 °C)   Ht 5' 10\" (1.778 m)   Wt 254 lb (115.2 kg)   SpO2 97%   BMI 36.45 kg/m²      GEN: Alert and oriented x3, no acute distress, cooperative   PULM: Chest symmetric, no increased work of breathing or accessory muscle use  CV: Heart regular rate   EXTREMITIES: Warm, dry. Right upper lateral thigh wound healing well with no drainage with healthy granulating tissue (see photo below)        ASSESSMENT AND PLAN:   Ansley Link is a 58 y.o. male who underwent a right thigh abscess I&D 4 weeks ago. Wound is healing well.      Stop cipro as wound is healing well with controlled blood glucose levels and some photosensitivity   Continue local wound care: packing changes daily  Follow up 2 weeks      Isaias Schaumann, MD   General surgeon    Electronically signed by Marbin Velazquez MD

## 2023-01-23 ENCOUNTER — OFFICE VISIT (OUTPATIENT)
Dept: SURGERY | Age: 63
End: 2023-01-23

## 2023-01-23 VITALS
DIASTOLIC BLOOD PRESSURE: 74 MMHG | WEIGHT: 254 LBS | SYSTOLIC BLOOD PRESSURE: 116 MMHG | HEART RATE: 60 BPM | TEMPERATURE: 98.6 F | BODY MASS INDEX: 36.36 KG/M2 | HEIGHT: 70 IN | OXYGEN SATURATION: 97 %

## 2023-01-23 DIAGNOSIS — L02.91 ABSCESS: Primary | ICD-10-CM

## 2023-01-23 PROCEDURE — 99024 POSTOP FOLLOW-UP VISIT: CPT | Performed by: SURGERY

## 2023-01-31 ENCOUNTER — OFFICE VISIT (OUTPATIENT)
Dept: ENDOCRINOLOGY | Age: 63
End: 2023-01-31
Payer: COMMERCIAL

## 2023-01-31 VITALS
OXYGEN SATURATION: 98 % | HEIGHT: 70 IN | WEIGHT: 258 LBS | BODY MASS INDEX: 36.94 KG/M2 | DIASTOLIC BLOOD PRESSURE: 83 MMHG | HEART RATE: 74 BPM | SYSTOLIC BLOOD PRESSURE: 126 MMHG

## 2023-01-31 DIAGNOSIS — Z98.84 S/P GASTRIC BYPASS: ICD-10-CM

## 2023-01-31 DIAGNOSIS — E11.65 POORLY CONTROLLED DIABETES MELLITUS (HCC): Primary | ICD-10-CM

## 2023-01-31 LAB
CHP ED QC CHECK: ABNORMAL
GLUCOSE BLD-MCNC: 264 MG/DL

## 2023-01-31 PROCEDURE — 3079F DIAST BP 80-89 MM HG: CPT | Performed by: INTERNAL MEDICINE

## 2023-01-31 PROCEDURE — 3074F SYST BP LT 130 MM HG: CPT | Performed by: INTERNAL MEDICINE

## 2023-01-31 PROCEDURE — 3046F HEMOGLOBIN A1C LEVEL >9.0%: CPT | Performed by: INTERNAL MEDICINE

## 2023-01-31 PROCEDURE — 95251 CONT GLUC MNTR ANALYSIS I&R: CPT | Performed by: INTERNAL MEDICINE

## 2023-01-31 PROCEDURE — 99213 OFFICE O/P EST LOW 20 MIN: CPT | Performed by: INTERNAL MEDICINE

## 2023-01-31 RX ORDER — INSULIN ASPART 100 [IU]/ML
INJECTION, SOLUTION INTRAVENOUS; SUBCUTANEOUS
Qty: 5 ADJUSTABLE DOSE PRE-FILLED PEN SYRINGE | Refills: 3 | Status: SHIPPED | OUTPATIENT
Start: 2023-01-31

## 2023-01-31 ASSESSMENT — ENCOUNTER SYMPTOMS: EYES NEGATIVE: 1

## 2023-01-31 NOTE — PROGRESS NOTES
2023    Assessment:       Diagnosis Orders   1. Poorly controlled diabetes mellitus ([address]  POCT Glucose    Basic Metabolic Panel    Hemoglobin A1C      2. S/P gastric bypass              PLAN:     Continue Basaglar 30 units at bedtime  Start on NovoLog 6 units with each meals  A1c goal of 7 or lower  Orders Placed This Encounter   Medications    insulin aspart (NOVOLOG FLEXPEN) 100 UNIT/ML injection pen     Si units at each meals     Dispense:  5 Adjustable Dose Pre-filled Pen Syringe     Refill:  3         Orders Placed This Encounter   Procedures    Basic Metabolic Panel     Standing Status:   Future     Standing Expiration Date:   2024    Hemoglobin A1C     Standing Status:   Future     Standing Expiration Date:   2024    POCT Glucose     No orders of the defined types were placed in this encounter. No follow-ups on file. Subjective:     Chief Complaint   Patient presents with    Diabetes     Vitals:    23 1008   BP: 126/83   Site: Left Upper Arm   Position: Sitting   Cuff Size: Large Adult   Pulse: 74   SpO2: 98%   Weight: 258 lb (117 kg)   Height: 5' 10\" (1.778 m)     Wt Readings from Last 3 Encounters:   23 258 lb (117 kg)   23 254 lb (115.2 kg)   23 247 lb 9.6 oz (112.3 kg)     BP Readings from Last 3 Encounters:   23 126/83   23 116/74   23 116/78     Follow-up on type 2 diabetes obesity history of bariatric surgery patient is on Basaglar 30 at night has not taken it because of hypoglycemic episode 1 day using freestyle edilia  Is also taking 70/30 NovoLog 6 units 3 times a day  Average blood sugars and freestyle Dorina Slot is between 1 60-1 80 higher sugars most of the day  No severe hypoglycemia recently    Diabetes  He presents for his follow-up diabetic visit. He has type 2 diabetes mellitus. Pertinent negatives for diabetes include no polydipsia and no polyuria. Symptoms are improving. Current diabetic treatment includes insulin injections.  He is currently taking insulin pre-breakfast, pre-lunch, pre-dinner and at bedtime. His overall blood glucose range is 140-180 mg/dl.    Past Medical History:   Diagnosis Date    ED (erectile dysfunction)     GERD (gastroesophageal reflux disease)     History of cardiovascular stress test     Normal; Saw Dr. Vitaly Khan    History of EKG 10/29/14    Hyperlipidemia     Hypertension     Hypogonadism in male 04/2017    Obesity, morbid, BMI 50 or higher (HCC)     EVELYN on CPAP     S/P laparoscopic sleeve gastrectomy 09/24/2018    Dr. Sahara Engel    Type II or unspecified type diabetes mellitus without mention of complication, not stated as uncontrolled     Vitamin D deficiency 10/17/2008     Past Surgical History:   Procedure Laterality Date    COLONOSCOPY  8/22/13    SLEEVE GASTRECTOMY  09/24/2018    [facility] EXTRACTION Left 2014    left lower widom tooth extraction     Social History     Socioeconomic History    Marital status:      Spouse name: Not on file    Number of children: 0    Years of education: Not on file    Highest education level: Not on file   Occupational History    Not on file   Tobacco Use    Smoking status: Never    Smokeless tobacco: Never   Substance and Sexual Activity    Alcohol use: Yes     Comment: rare    Drug use: No    Sexual activity: Yes     Partners: Female   Other Topics Concern    Not on file   Social History Narrative    Not on file     Social Determinants of Health     Financial Resource Strain: Low Risk     Difficulty of Paying Living Expenses: Not hard at all   Food Insecurity: No Food Insecurity    Worried About Running Out of Food in the Last Year: Never true    Ran Out of Food in the Last Year: Never true   Transportation Needs: Not on file   Physical Activity: Not on file   Stress: Not on file   Social Connections: Not on file   Intimate Partner Violence: Not on file   Housing Stability: Not on file     Family History   Problem Relation Age of Onset    Heart Disease Father          due to MI     No Known Allergies    Current Outpatient Medications:     insulin glargine (BASAGLAR KWIKPEN) 100 UNIT/ML injection pen, Inject 30 Units into the skin nightly Ok to substitute, Disp: 15 Adjustable Dose Pre-filled Pen Syringe, Rfl: 1    insulin aspart protamine-insulin aspart (NOVOLOG 70/30) (70-30) 100 UNIT/ML injection, 6 units with each meals, Disp: 5 Adjustable Dose Pre-filled Pen Syringe, Rfl: 3    sodium hypochlorite (DAKINS) 0.125 % SOLN external solution, Apply topically daily, Disp: 500 mL, Rfl: 1    atorvastatin (LIPITOR) 40 MG tablet, Take 1 tablet by mouth daily, Disp: 30 tablet, Rfl: 5    blood glucose monitor kit and supplies, Dispense one meter that insurance will cover. , Disp: 1 kit, Rfl: 0    blood glucose monitor strips, Test 3x daily e11.65, Disp: 100 strip, Rfl: 3    Lancets MISC, 1 each by Does not apply route daily, Disp: 100 each, Rfl: 3    Insulin Pen Needle (NOVOFINE PLUS PEN NEEDLE) 32G X 4 MM MISC, qid, Disp: 200 each, Rfl: 3    Continuous Blood Gluc Sensor (FREESTYLE CODY 2 SENSOR) MISC, Every 2 weeks, Disp: 2 each, Rfl: 3    Continuous Blood Gluc  (FREESTYLE CODY 2 READER) FLAKO, As directed, Disp: 1 each, Rfl: 0    mupirocin (BACTROBAN) 2 % ointment, Apply topically 3 times daily. , Disp: 1 each, Rfl: 0    Multiple Vitamins-Minerals (BARIATRIC MULTIVITAMINS/IRON PO), Take 1 tablet by mouth daily As directed, Disp: , Rfl:     Oxymetazoline HCl (NASAL SPRAY NA), by Nasal route, Disp: , Rfl:   Lab Results   Component Value Date     2022    K 5.1 (H) 2022     2022    CO2 24 2022    BUN 32 (H) 2022    CREATININE 0.91 2022    GLUCOSE 264 (H) 2023    CALCIUM 9.8 2022    PROT 7.8 2022    LABALBU 4.6 2022    BILITOT 0.5 2022    ALKPHOS 110 (H) 2022    AST 15 2022    ALT 9 2022    LABGLOM >60.0 2022    GFRAA >60.0 2022 GLOB 3.2 09/08/2022     Lab Results   Component Value Date    WBC 5.6 09/08/2022    HGB 15.0 09/08/2022    HCT 44.6 09/08/2022    MCV 88.8 09/08/2022     09/08/2022     Lab Results   Component Value Date    LABA1C 14.0 01/03/2023    LABA1C 15.0 (H) 09/08/2022    LABA1C 8.5 01/07/2019     Lab Results   Component Value Date    HDL 52 09/08/2022    HDL 42 05/29/2019    HDL 38 (L) 01/30/2018    LDLCALC 229 (H) 09/08/2022    LDLCALC 138 (H) 05/29/2019    LDLCALC 175 (H) 01/30/2018    CHOL 296 (H) 09/08/2022    CHOL 189 05/29/2019    CHOL 233 (H) 01/30/2018    TRIG 74 09/08/2022    TRIG 47 05/29/2019    TRIG 102 01/30/2018     Lab Results   Component Value Date    TESTM 253 (L) 11/02/2018    TESTM 118 (L) 04/26/2017     Lab Results   Component Value Date    TSH 1.570 04/26/2017    TSH 3.270 04/27/2016    TSH 2.720 10/30/2014    TSHREFLEX 2.030 09/08/2022    T4FREE 1.14 04/27/2016    T4FREE 1.32 10/30/2014     No results found for: TPOABS    Review of Systems   Eyes: Negative. Cardiovascular: Negative. Endocrine: Negative for polydipsia and polyuria. Neurological: Negative. All other systems reviewed and are negative. Objective:   Physical Exam  Vitals reviewed. Constitutional:       General: He is not in acute distress. Appearance: Normal appearance. He is obese. HENT:      Head: Normocephalic and atraumatic. Right Ear: External ear normal.      Left Ear: External ear normal.      Nose: Nose normal.   Eyes:      General: No scleral icterus. Right eye: No discharge. Left eye: No discharge. Extraocular Movements: Extraocular movements intact. Conjunctiva/sclera: Conjunctivae normal.   Cardiovascular:      Rate and Rhythm: Normal rate. Pulmonary:      Effort: Pulmonary effort is normal.   Musculoskeletal:         General: Normal range of motion. Cervical back: Normal range of motion and neck supple. Neurological:      General: No focal deficit present. Mental Status: He is alert and oriented to person, place, and time.    Psychiatric:         Mood and Affect: Mood normal.         Behavior: Behavior normal.

## 2023-02-06 ENCOUNTER — OFFICE VISIT (OUTPATIENT)
Dept: SURGERY | Age: 63
End: 2023-02-06

## 2023-02-06 VITALS
TEMPERATURE: 98.6 F | HEART RATE: 62 BPM | OXYGEN SATURATION: 99 % | HEIGHT: 70 IN | BODY MASS INDEX: 36.36 KG/M2 | DIASTOLIC BLOOD PRESSURE: 80 MMHG | WEIGHT: 254 LBS | SYSTOLIC BLOOD PRESSURE: 124 MMHG

## 2023-02-06 DIAGNOSIS — L02.91 ABSCESS: Primary | ICD-10-CM

## 2023-02-06 PROCEDURE — 99024 POSTOP FOLLOW-UP VISIT: CPT | Performed by: SURGERY

## 2023-02-06 NOTE — PROGRESS NOTES
GENERAL SURGERY WOUND CHECK VISIT    Pt Name: Kishan Gonzalez  MRN: [id number]  Date: 2/6/2023      SUBJECTIVE:   Kishan Gonzalez is a 58 y.o. male who presents for a follow up wound check. He underwent a right thigh abscess I&D 6 weeks ago. He is doing well. He continues to pack the wound, it is getting smaller and is needing less nu-gauze for packing. His sugars have been very well controlled. No fevers, chills, night sweats. OBJECTIVE:   CURRENT VITALS: /80   Pulse 62   Temp 98.6 °F (37 °C)   Ht 5' 10\" (1.778 m)   Wt 254 lb (115.2 kg)   SpO2 99%   BMI 36.45 kg/m²      GEN: Alert and oriented x3, no acute distress, cooperative   PULM: Chest symmetric, no increased work of breathing or accessory muscle use  CV: Heart regular rate   EXTREMITIES: Warm, dry. Right upper lateral thigh wound healing well: healthy granulating tissue with scant amount of fibrinous exudate at the periphery (see photo below)         ASSESSMENT AND PLAN:   Kishan Gonzalez is a 58 y.o. male who underwent a right thigh abscess I&D 6 weeks ago. Wound continues to heal well, small debridement of fibrinous exudate performed.      Continue local wound care: packing changes daily  Follow up 4 weeks      Alicia Corley MD   General surgeon    Electronically signed by Nitin Polk MD

## 2023-03-03 ENCOUNTER — OFFICE VISIT (OUTPATIENT)
Dept: SURGERY | Age: 63
End: 2023-03-03

## 2023-03-03 VITALS
HEART RATE: 62 BPM | TEMPERATURE: 96.4 F | BODY MASS INDEX: 37.8 KG/M2 | HEIGHT: 70 IN | WEIGHT: 264 LBS | OXYGEN SATURATION: 97 % | DIASTOLIC BLOOD PRESSURE: 64 MMHG | SYSTOLIC BLOOD PRESSURE: 102 MMHG

## 2023-03-03 DIAGNOSIS — Z51.89 VISIT FOR WOUND CHECK: Primary | ICD-10-CM

## 2023-03-03 PROCEDURE — 99024 POSTOP FOLLOW-UP VISIT: CPT | Performed by: SURGERY

## 2023-05-08 ENCOUNTER — TELEPHONE (OUTPATIENT)
Dept: GASTROENTEROLOGY | Age: 63
End: 2023-05-08

## 2023-06-01 ENCOUNTER — OFFICE VISIT (OUTPATIENT)
Dept: ENDOCRINOLOGY | Age: 63
End: 2023-06-01

## 2023-06-01 VITALS
SYSTOLIC BLOOD PRESSURE: 123 MMHG | DIASTOLIC BLOOD PRESSURE: 82 MMHG | BODY MASS INDEX: 37.94 KG/M2 | HEART RATE: 88 BPM | OXYGEN SATURATION: 97 % | HEIGHT: 70 IN | WEIGHT: 265 LBS

## 2023-06-01 DIAGNOSIS — Z98.84 S/P GASTRIC BYPASS: ICD-10-CM

## 2023-06-01 DIAGNOSIS — E11.65 POORLY CONTROLLED DIABETES MELLITUS (HCC): Primary | ICD-10-CM

## 2023-06-01 LAB
CHP ED QC CHECK: NORMAL
GLUCOSE BLD-MCNC: 158 MG/DL
HBA1C MFR BLD: 8.7 %

## 2023-06-01 RX ORDER — PEN NEEDLE, DIABETIC 32 GX 1/6"
NEEDLE, DISPOSABLE MISCELLANEOUS
Qty: 200 EACH | Refills: 3 | Status: SHIPPED | OUTPATIENT
Start: 2023-06-01

## 2023-06-01 RX ORDER — INSULIN ASPART 100 [IU]/ML
INJECTION, SOLUTION INTRAVENOUS; SUBCUTANEOUS
Qty: 5 ADJUSTABLE DOSE PRE-FILLED PEN SYRINGE | Refills: 3 | Status: SHIPPED | OUTPATIENT
Start: 2023-06-01

## 2023-06-01 NOTE — PROGRESS NOTES
follow-up diabetic visit. He has type 2 diabetes mellitus. Symptoms are improving. Risk factors for coronary artery disease include obesity. Current diabetic treatment includes insulin injections. He is currently taking insulin pre-breakfast, pre-lunch, pre-dinner and at bedtime. His overall blood glucose range is >200 mg/dl.  (Hemoglobin A1C       Date                     Value               Ref Range           Status                06/01/2023               8.7                 %                   Final            ----------  )   Other    Past Medical History:   Diagnosis Date    ED (erectile dysfunction)     GERD (gastroesophageal reflux disease)     History of cardiovascular stress test     Normal; Saw Dr. Osman Patel    History of EKG 10/29/14    Hyperlipidemia     Hypertension     Hypogonadism in male 04/2017    Obesity, morbid, BMI 50 or higher (HCC)     EVELYN on CPAP     S/P laparoscopic sleeve gastrectomy 09/24/2018    Dr. Canelo Palacio    Type II or unspecified type diabetes mellitus without mention of complication, not stated as uncontrolled     Vitamin D deficiency 10/17/2008     Past Surgical History:   Procedure Laterality Date    COLONOSCOPY  8/22/13    SLEEVE GASTRECTOMY  09/24/2018    [address] [place] [address] Street EXTRACTION Left 2014    left lower widom tooth extraction     Social History     Socioeconomic History    Marital status:      Spouse name: Not on file    Number of children: 0    Years of education: Not on file    Highest education level: Not on file   Occupational History    Not on file   Tobacco Use    Smoking status: Never    Smokeless tobacco: Never   Substance and Sexual Activity    Alcohol use: Yes     Comment: rare    Drug use: No    Sexual activity: Yes     Partners: Female   Other Topics Concern    Not on file   Social History Narrative    Not on file     Social Determinants of Health     Financial Resource Strain: Low Risk     Difficulty of Paying Living Expenses: Not

## 2023-06-04 ASSESSMENT — ENCOUNTER SYMPTOMS: EYES NEGATIVE: 1

## 2023-06-06 ENCOUNTER — OFFICE VISIT (OUTPATIENT)
Dept: SURGERY | Age: 63
End: 2023-06-06
Payer: COMMERCIAL

## 2023-06-06 VITALS
HEIGHT: 70 IN | TEMPERATURE: 97.4 F | BODY MASS INDEX: 37.82 KG/M2 | HEART RATE: 92 BPM | OXYGEN SATURATION: 97 % | WEIGHT: 264.2 LBS

## 2023-06-06 DIAGNOSIS — Z12.11 ENCOUNTER FOR SCREENING COLONOSCOPY: Primary | ICD-10-CM

## 2023-06-06 PROCEDURE — 99203 OFFICE O/P NEW LOW 30 MIN: CPT | Performed by: COLON & RECTAL SURGERY

## 2023-06-06 ASSESSMENT — ENCOUNTER SYMPTOMS
NAUSEA: 0
CHEST TIGHTNESS: 0
CONSTIPATION: 0
VOMITING: 0
COLOR CHANGE: 0
DIARRHEA: 0
ABDOMINAL PAIN: 0

## 2023-06-06 NOTE — PROGRESS NOTES
Musculoskeletal:         General: No tenderness. Normal range of motion. Cervical back: Normal range of motion. Skin:     General: Skin is warm and dry. Findings: No erythema or rash. Neurological:      Mental Status: He is alert and oriented to person, place, and time. Psychiatric:         Behavior: Behavior normal.         Thought Content: Thought content normal.         Judgment: Judgment normal.            Assessment/Plan:          Diagnosis Orders   1. Encounter for screening colonoscopy          I discussed the risks and benefits of colonoscopy. Bowel prep instructions were given. Discussion regarding analgesia and perioperative course outlined. He wishes to proceed. He will call the office upon choosing a date. Please note this report has beenpartially produced using speech recognition software and may cause contain errors related to that system including grammar, punctuation and spelling as well as words and phrases that may seem inappropriate.  If there arequestions or concerns please feel free to contact me to clarify

## 2023-06-23 ENCOUNTER — OFFICE VISIT (OUTPATIENT)
Dept: FAMILY MEDICINE CLINIC | Age: 63
End: 2023-06-23
Payer: COMMERCIAL

## 2023-06-23 VITALS
OXYGEN SATURATION: 100 % | WEIGHT: 261 LBS | BODY MASS INDEX: 37.37 KG/M2 | TEMPERATURE: 96.4 F | HEIGHT: 70 IN | SYSTOLIC BLOOD PRESSURE: 120 MMHG | DIASTOLIC BLOOD PRESSURE: 82 MMHG | HEART RATE: 72 BPM

## 2023-06-23 DIAGNOSIS — M79.675 PAIN AND SWELLING OF TOE, LEFT: Primary | ICD-10-CM

## 2023-06-23 DIAGNOSIS — M79.675 PAIN AND SWELLING OF TOE, LEFT: ICD-10-CM

## 2023-06-23 DIAGNOSIS — M79.89 PAIN AND SWELLING OF TOE, LEFT: ICD-10-CM

## 2023-06-23 DIAGNOSIS — M79.89 PAIN AND SWELLING OF TOE, LEFT: Primary | ICD-10-CM

## 2023-06-23 LAB — URATE SERPL-MCNC: 7.5 MG/DL (ref 3.4–7)

## 2023-06-23 PROCEDURE — 3074F SYST BP LT 130 MM HG: CPT | Performed by: NURSE PRACTITIONER

## 2023-06-23 PROCEDURE — 99214 OFFICE O/P EST MOD 30 MIN: CPT | Performed by: NURSE PRACTITIONER

## 2023-06-23 PROCEDURE — 3078F DIAST BP <80 MM HG: CPT | Performed by: NURSE PRACTITIONER

## 2023-06-23 RX ORDER — METHYLPREDNISOLONE 4 MG/1
TABLET ORAL
Qty: 21 TABLET | Refills: 0 | Status: SHIPPED | OUTPATIENT
Start: 2023-06-23 | End: 2023-06-29

## 2023-06-23 RX ORDER — SULFAMETHOXAZOLE AND TRIMETHOPRIM 800; 160 MG/1; MG/1
1 TABLET ORAL 2 TIMES DAILY
Qty: 20 TABLET | Refills: 0 | Status: SHIPPED | OUTPATIENT
Start: 2023-06-23 | End: 2023-07-03

## 2023-06-23 SDOH — ECONOMIC STABILITY: INCOME INSECURITY: HOW HARD IS IT FOR YOU TO PAY FOR THE VERY BASICS LIKE FOOD, HOUSING, MEDICAL CARE, AND HEATING?: NOT HARD AT ALL

## 2023-06-23 SDOH — ECONOMIC STABILITY: FOOD INSECURITY: WITHIN THE PAST 12 MONTHS, THE FOOD YOU BOUGHT JUST DIDN'T LAST AND YOU DIDN'T HAVE MONEY TO GET MORE.: NEVER TRUE

## 2023-06-23 SDOH — ECONOMIC STABILITY: FOOD INSECURITY: WITHIN THE PAST 12 MONTHS, YOU WORRIED THAT YOUR FOOD WOULD RUN OUT BEFORE YOU GOT MONEY TO BUY MORE.: NEVER TRUE

## 2023-06-23 SDOH — ECONOMIC STABILITY: HOUSING INSECURITY
IN THE LAST 12 MONTHS, WAS THERE A TIME WHEN YOU DID NOT HAVE A STEADY PLACE TO SLEEP OR SLEPT IN A SHELTER (INCLUDING NOW)?: NO

## 2023-06-23 NOTE — PROGRESS NOTES
DS;SEPTRA DS) 800-160 MG per tablet     Sig: Take 1 tablet by mouth 2 times daily for 10 days     Dispense:  20 tablet     Refill:  0       There are no discontinued medications. Return for as scheduled or sooner if needed.     Cristal Antony, APRN - CNP

## 2023-06-25 ASSESSMENT — ENCOUNTER SYMPTOMS
DIARRHEA: 0
ABDOMINAL PAIN: 0
EYE PAIN: 0
SHORTNESS OF BREATH: 0
CHEST TIGHTNESS: 0
COUGH: 0
CONSTIPATION: 0
COLOR CHANGE: 1
TROUBLE SWALLOWING: 0

## 2023-06-30 ENCOUNTER — TELEPHONE (OUTPATIENT)
Dept: FAMILY MEDICINE CLINIC | Age: 63
End: 2023-06-30

## 2023-07-09 ENCOUNTER — HOSPITAL ENCOUNTER (INPATIENT)
Age: 63
LOS: 4 days | Discharge: HOME | End: 2023-07-14
Attending: STUDENT IN AN ORGANIZED HEALTH CARE EDUCATION/TRAINING PROGRAM | Admitting: INTERNAL MEDICINE
Payer: COMMERCIAL

## 2023-07-09 DIAGNOSIS — A41.9 SEPTICEMIA (HCC): Primary | ICD-10-CM

## 2023-07-09 DIAGNOSIS — S91.109A OPEN WOUND OF TOE, INITIAL ENCOUNTER: ICD-10-CM

## 2023-07-09 DIAGNOSIS — A48.0 GAS GANGRENE OF FOOT (HCC): ICD-10-CM

## 2023-07-09 PROCEDURE — 85610 PROTHROMBIN TIME: CPT

## 2023-07-09 PROCEDURE — 83880 ASSAY OF NATRIURETIC PEPTIDE: CPT

## 2023-07-09 PROCEDURE — 96361 HYDRATE IV INFUSION ADD-ON: CPT

## 2023-07-09 PROCEDURE — 82550 ASSAY OF CK (CPK): CPT

## 2023-07-09 PROCEDURE — 99285 EMERGENCY DEPT VISIT HI MDM: CPT

## 2023-07-09 PROCEDURE — 83690 ASSAY OF LIPASE: CPT

## 2023-07-09 PROCEDURE — 36415 COLL VENOUS BLD VENIPUNCTURE: CPT

## 2023-07-09 PROCEDURE — 84550 ASSAY OF BLOOD/URIC ACID: CPT

## 2023-07-09 PROCEDURE — 83605 ASSAY OF LACTIC ACID: CPT

## 2023-07-09 PROCEDURE — 85025 COMPLETE CBC W/AUTO DIFF WBC: CPT

## 2023-07-09 PROCEDURE — 93005 ELECTROCARDIOGRAM TRACING: CPT | Performed by: PERSONAL EMERGENCY RESPONSE ATTENDANT

## 2023-07-09 PROCEDURE — 2580000003 HC RX 258: Performed by: PERSONAL EMERGENCY RESPONSE ATTENDANT

## 2023-07-09 PROCEDURE — 86140 C-REACTIVE PROTEIN: CPT

## 2023-07-09 PROCEDURE — 81001 URINALYSIS AUTO W/SCOPE: CPT

## 2023-07-09 PROCEDURE — 0202U NFCT DS 22 TRGT SARS-COV-2: CPT

## 2023-07-09 PROCEDURE — 87150 DNA/RNA AMPLIFIED PROBE: CPT

## 2023-07-09 PROCEDURE — 96360 HYDRATION IV INFUSION INIT: CPT

## 2023-07-09 PROCEDURE — 87040 BLOOD CULTURE FOR BACTERIA: CPT

## 2023-07-09 PROCEDURE — 84443 ASSAY THYROID STIM HORMONE: CPT

## 2023-07-09 PROCEDURE — 84145 PROCALCITONIN (PCT): CPT

## 2023-07-09 PROCEDURE — 84484 ASSAY OF TROPONIN QUANT: CPT

## 2023-07-09 PROCEDURE — 85652 RBC SED RATE AUTOMATED: CPT

## 2023-07-09 PROCEDURE — 83735 ASSAY OF MAGNESIUM: CPT

## 2023-07-09 PROCEDURE — 80053 COMPREHEN METABOLIC PANEL: CPT

## 2023-07-09 RX ORDER — 0.9 % SODIUM CHLORIDE 0.9 %
1000 INTRAVENOUS SOLUTION INTRAVENOUS ONCE
Status: COMPLETED | OUTPATIENT
Start: 2023-07-09 | End: 2023-07-10

## 2023-07-09 RX ADMIN — SODIUM CHLORIDE 1000 ML: 9 INJECTION, SOLUTION INTRAVENOUS at 23:45

## 2023-07-09 ASSESSMENT — LIFESTYLE VARIABLES
HOW MANY STANDARD DRINKS CONTAINING ALCOHOL DO YOU HAVE ON A TYPICAL DAY: PATIENT DOES NOT DRINK
HOW OFTEN DO YOU HAVE A DRINK CONTAINING ALCOHOL: NEVER

## 2023-07-09 ASSESSMENT — PAIN SCALES - GENERAL: PAINLEVEL_OUTOF10: 4

## 2023-07-09 ASSESSMENT — PAIN - FUNCTIONAL ASSESSMENT: PAIN_FUNCTIONAL_ASSESSMENT: 0-10

## 2023-07-10 ENCOUNTER — ANESTHESIA EVENT (OUTPATIENT)
Dept: OPERATING ROOM | Age: 63
End: 2023-07-10
Payer: COMMERCIAL

## 2023-07-10 ENCOUNTER — APPOINTMENT (OUTPATIENT)
Dept: CT IMAGING | Age: 63
End: 2023-07-10
Payer: COMMERCIAL

## 2023-07-10 ENCOUNTER — APPOINTMENT (OUTPATIENT)
Dept: GENERAL RADIOLOGY | Age: 63
End: 2023-07-10
Payer: COMMERCIAL

## 2023-07-10 ENCOUNTER — ANESTHESIA (OUTPATIENT)
Dept: OPERATING ROOM | Age: 63
End: 2023-07-10
Payer: COMMERCIAL

## 2023-07-10 PROBLEM — A48.0 GAS GANGRENE OF FOOT (HCC): Status: ACTIVE | Noted: 2023-07-10

## 2023-07-10 PROBLEM — A41.9 SEPSIS (HCC): Status: ACTIVE | Noted: 2023-07-10

## 2023-07-10 PROBLEM — E11.9 TYPE 2 DIABETES MELLITUS WITHOUT COMPLICATION (HCC): Status: ACTIVE | Noted: 2023-07-10

## 2023-07-10 LAB
ABO + RH BLD: NORMAL
ALBUMIN SERPL-MCNC: 3.4 G/DL (ref 3.5–4.6)
ALBUMIN SERPL-MCNC: 3.7 G/DL (ref 3.5–4.6)
ALP SERPL-CCNC: 66 U/L (ref 35–104)
ALP SERPL-CCNC: 84 U/L (ref 35–104)
ALT SERPL-CCNC: 10 U/L (ref 0–41)
ALT SERPL-CCNC: 11 U/L (ref 0–41)
ANION GAP SERPL CALCULATED.3IONS-SCNC: 13 MEQ/L (ref 9–15)
ANION GAP SERPL CALCULATED.3IONS-SCNC: 17 MEQ/L (ref 9–15)
AST SERPL-CCNC: 12 U/L (ref 0–40)
AST SERPL-CCNC: 12 U/L (ref 0–40)
B PARAP IS1001 DNA NPH QL NAA+NON-PROBE: NOT DETECTED
B PERT.PT PRMT NPH QL NAA+NON-PROBE: NOT DETECTED
BACTERIA URNS QL MICRO: NEGATIVE /HPF
BASE EXCESS VENOUS: -3 (ref -3–3)
BASOPHILS # BLD: 0 K/UL (ref 0–0.2)
BASOPHILS # BLD: 0 K/UL (ref 0–0.2)
BASOPHILS NFR BLD: 0 %
BASOPHILS NFR BLD: 0.4 %
BILIRUB SERPL-MCNC: 0.4 MG/DL (ref 0.2–0.7)
BILIRUB SERPL-MCNC: 0.4 MG/DL (ref 0.2–0.7)
BILIRUB UR QL STRIP: NEGATIVE
BLD GP AB SCN SERPL QL: NORMAL
BNP BLD-MCNC: 587 PG/ML
BUN SERPL-MCNC: 23 MG/DL (ref 8–23)
BUN SERPL-MCNC: 24 MG/DL (ref 8–23)
C PNEUM DNA NPH QL NAA+NON-PROBE: NOT DETECTED
CALCIUM IONIZED: 1.13 MMOL/L (ref 1.12–1.32)
CALCIUM SERPL-MCNC: 8.1 MG/DL (ref 8.5–9.9)
CALCIUM SERPL-MCNC: 8.9 MG/DL (ref 8.5–9.9)
CHLORIDE SERPL-SCNC: 101 MEQ/L (ref 95–107)
CHLORIDE SERPL-SCNC: 109 MEQ/L (ref 95–107)
CK SERPL-CCNC: 68 U/L (ref 0–190)
CLARITY UR: CLEAR
CO2 SERPL-SCNC: 19 MEQ/L (ref 20–31)
CO2 SERPL-SCNC: 21 MEQ/L (ref 20–31)
COLOR UR: YELLOW
CREAT SERPL-MCNC: 1.02 MG/DL (ref 0.7–1.2)
CREAT SERPL-MCNC: 1.13 MG/DL (ref 0.7–1.2)
CRP SERPL HS-MCNC: 109.3 MG/L (ref 0–5)
EKG ATRIAL RATE: 117 BPM
EKG ATRIAL RATE: 300 BPM
EKG P AXIS: 231 DEGREES
EKG P AXIS: 55 DEGREES
EKG P-R INTERVAL: 164 MS
EKG Q-T INTERVAL: 328 MS
EKG Q-T INTERVAL: 330 MS
EKG QRS DURATION: 90 MS
EKG QRS DURATION: 92 MS
EKG QTC CALCULATION (BAZETT): 460 MS
EKG QTC CALCULATION (BAZETT): 518 MS
EKG R AXIS: 23 DEGREES
EKG R AXIS: 31 DEGREES
EKG T AXIS: -75 DEGREES
EKG T AXIS: 34 DEGREES
EKG VENTRICULAR RATE: 117 BPM
EKG VENTRICULAR RATE: 150 BPM
EOSINOPHIL # BLD: 0 K/UL (ref 0–0.7)
EOSINOPHIL # BLD: 0 K/UL (ref 0–0.7)
EOSINOPHIL NFR BLD: 0 %
EOSINOPHIL NFR BLD: 0 %
EPI CELLS #/AREA URNS AUTO: ABNORMAL /HPF (ref 0–5)
ERYTHROCYTE [DISTWIDTH] IN BLOOD BY AUTOMATED COUNT: 13.5 % (ref 11.5–14.5)
ERYTHROCYTE [DISTWIDTH] IN BLOOD BY AUTOMATED COUNT: 13.7 % (ref 11.5–14.5)
ERYTHROCYTE [SEDIMENTATION RATE] IN BLOOD BY WESTERGREN METHOD: 64 MM (ref 0–20)
FLUAV RNA NPH QL NAA+NON-PROBE: NOT DETECTED
FLUBV RNA NPH QL NAA+NON-PROBE: NOT DETECTED
GLOBULIN SER CALC-MCNC: 2.7 G/DL (ref 2.3–3.5)
GLOBULIN SER CALC-MCNC: 3.1 G/DL (ref 2.3–3.5)
GLUCOSE BLD-MCNC: 201 MG/DL (ref 70–99)
GLUCOSE BLD-MCNC: 214 MG/DL (ref 70–99)
GLUCOSE BLD-MCNC: 237 MG/DL (ref 70–99)
GLUCOSE BLD-MCNC: 266 MG/DL (ref 70–99)
GLUCOSE SERPL-MCNC: 268 MG/DL (ref 70–99)
GLUCOSE SERPL-MCNC: 271 MG/DL (ref 70–99)
GLUCOSE UR STRIP-MCNC: >=1000 MG/DL
HADV DNA NPH QL NAA+NON-PROBE: NOT DETECTED
HCO3 VENOUS: 21.6 MMOL/L (ref 23–29)
HCOV 229E RNA NPH QL NAA+NON-PROBE: NOT DETECTED
HCOV HKU1 RNA NPH QL NAA+NON-PROBE: NOT DETECTED
HCOV NL63 RNA NPH QL NAA+NON-PROBE: NOT DETECTED
HCOV OC43 RNA NPH QL NAA+NON-PROBE: NOT DETECTED
HCT VFR BLD AUTO: 36.3 % (ref 42–52)
HCT VFR BLD AUTO: 39.6 % (ref 42–52)
HCT VFR BLD AUTO: 42 % (ref 41–53)
HGB BLD CALC-MCNC: 14.4 GM/DL (ref 13.5–17.5)
HGB BLD-MCNC: 12.4 G/DL (ref 14–18)
HGB BLD-MCNC: 13.4 G/DL (ref 14–18)
HGB UR QL STRIP: ABNORMAL
HMPV RNA NPH QL NAA+NON-PROBE: NOT DETECTED
HPIV1 RNA NPH QL NAA+NON-PROBE: NOT DETECTED
HPIV2 RNA NPH QL NAA+NON-PROBE: NOT DETECTED
HPIV3 RNA NPH QL NAA+NON-PROBE: NOT DETECTED
HPIV4 RNA NPH QL NAA+NON-PROBE: NOT DETECTED
HYALINE CASTS #/AREA URNS AUTO: ABNORMAL /HPF (ref 0–5)
INR PPP: 1.1
KETONES UR STRIP-MCNC: 15 MG/DL
LACTATE BLDV-SCNC: 1 MMOL/L (ref 0.5–2.2)
LACTATE: 2.09 MMOL/L (ref 0.4–2)
LACTIC ACID, SEPSIS: 1.3 MMOL/L (ref 0.5–1.9)
LEUKOCYTE ESTERASE UR QL STRIP: NEGATIVE
LIPASE SERPL-CCNC: 22 U/L (ref 12–95)
LYMPHOCYTES # BLD: 0 K/UL (ref 1–4.8)
LYMPHOCYTES # BLD: 0.4 K/UL (ref 1–4.8)
LYMPHOCYTES NFR BLD: 0 %
LYMPHOCYTES NFR BLD: 4 %
M PNEUMO DNA NPH QL NAA+NON-PROBE: NOT DETECTED
MAGNESIUM SERPL-MCNC: 1.7 MG/DL (ref 1.7–2.4)
MCH RBC QN AUTO: 29.5 PG (ref 27–31.3)
MCH RBC QN AUTO: 30.3 PG (ref 27–31.3)
MCHC RBC AUTO-ENTMCNC: 33.7 % (ref 33–37)
MCHC RBC AUTO-ENTMCNC: 34 % (ref 33–37)
MCV RBC AUTO: 87.6 FL (ref 79–92.2)
MCV RBC AUTO: 89.1 FL (ref 79–92.2)
MONOCYTES # BLD: 0 K/UL (ref 0.2–0.8)
MONOCYTES # BLD: 0.4 K/UL (ref 0.2–0.8)
MONOCYTES NFR BLD: 0 %
MONOCYTES NFR BLD: 4.3 %
NEUTROPHILS # BLD: 0 K/UL (ref 1.4–6.5)
NEUTROPHILS # BLD: 8.7 K/UL (ref 1.4–6.5)
NEUTS SEG NFR BLD: 0 %
NEUTS SEG NFR BLD: 91.3 %
NITRITE UR QL STRIP: NEGATIVE
O2 SAT, VEN: 92 %
PCO2, VEN: 33.9 MM HG (ref 40–50)
PERFORMED ON: ABNORMAL
PH UR STRIP: 5.5 [PH] (ref 5–9)
PH VENOUS: 7.41 (ref 7.32–7.42)
PLATELET # BLD AUTO: 160 K/UL (ref 130–400)
PLATELET # BLD AUTO: 175 K/UL (ref 130–400)
PLATELET BLD QL SMEAR: ADEQUATE
PO2, VEN: 61 MM HG
POC CHLORIDE: 105 MEQ/L (ref 99–110)
POC CREATININE: 1 MG/DL (ref 0.8–1.3)
POC SAMPLE TYPE: ABNORMAL
POTASSIUM SERPL-SCNC: 3.9 MEQ/L (ref 3.4–4.9)
POTASSIUM SERPL-SCNC: 4 MEQ/L (ref 3.5–5.1)
POTASSIUM SERPL-SCNC: 4.2 MEQ/L (ref 3.4–4.9)
PROCALCITONIN SERPL IA-MCNC: 1.06 NG/ML (ref 0–0.15)
PROT SERPL-MCNC: 6.1 G/DL (ref 6.3–8)
PROT SERPL-MCNC: 6.8 G/DL (ref 6.3–8)
PROT UR STRIP-MCNC: 100 MG/DL
PROTHROMBIN TIME: 14.5 SEC (ref 12.3–14.9)
RBC # BLD AUTO: 4.08 M/UL (ref 4.7–6.1)
RBC # BLD AUTO: 4.53 M/UL (ref 4.7–6.1)
RBC #/AREA URNS AUTO: ABNORMAL /HPF (ref 0–5)
RSV RNA NPH QL NAA+NON-PROBE: NOT DETECTED
RV+EV RNA NPH QL NAA+NON-PROBE: NOT DETECTED
SARS-COV-2 RNA NPH QL NAA+NON-PROBE: NOT DETECTED
SLIDE REVIEW: ABNORMAL
SODIUM BLD-SCNC: 140 MEQ/L (ref 136–145)
SODIUM SERPL-SCNC: 139 MEQ/L (ref 135–144)
SODIUM SERPL-SCNC: 141 MEQ/L (ref 135–144)
SP GR UR STRIP: 1.02 (ref 1–1.03)
TCO2 CALC VENOUS: 23 MMOL/L
TROPONIN T SERPL-MCNC: <0.01 NG/ML (ref 0–0.01)
TSH REFLEX: 1.12 UIU/ML (ref 0.44–3.86)
URATE SERPL-MCNC: 6.7 MG/DL (ref 3.4–7)
URINE REFLEX TO CULTURE: ABNORMAL
UROBILINOGEN UR STRIP-ACNC: 0.2 E.U./DL
WBC # BLD AUTO: 15.8 K/UL (ref 4.8–10.8)
WBC # BLD AUTO: 9.6 K/UL (ref 4.8–10.8)
WBC #/AREA URNS AUTO: ABNORMAL /HPF (ref 0–5)

## 2023-07-10 PROCEDURE — 6370000000 HC RX 637 (ALT 250 FOR IP): Performed by: NURSE PRACTITIONER

## 2023-07-10 PROCEDURE — 84132 ASSAY OF SERUM POTASSIUM: CPT

## 2023-07-10 PROCEDURE — 86900 BLOOD TYPING SEROLOGIC ABO: CPT

## 2023-07-10 PROCEDURE — 82565 ASSAY OF CREATININE: CPT

## 2023-07-10 PROCEDURE — 87075 CULTR BACTERIA EXCEPT BLOOD: CPT

## 2023-07-10 PROCEDURE — 2500000003 HC RX 250 WO HCPCS

## 2023-07-10 PROCEDURE — 3600000004 HC SURGERY LEVEL 4 BASE: Performed by: STUDENT IN AN ORGANIZED HEALTH CARE EDUCATION/TRAINING PROGRAM

## 2023-07-10 PROCEDURE — 93005 ELECTROCARDIOGRAM TRACING: CPT | Performed by: INTERNAL MEDICINE

## 2023-07-10 PROCEDURE — 2709999900 HC NON-CHARGEABLE SUPPLY: Performed by: STUDENT IN AN ORGANIZED HEALTH CARE EDUCATION/TRAINING PROGRAM

## 2023-07-10 PROCEDURE — 3600000014 HC SURGERY LEVEL 4 ADDTL 15MIN: Performed by: STUDENT IN AN ORGANIZED HEALTH CARE EDUCATION/TRAINING PROGRAM

## 2023-07-10 PROCEDURE — 85014 HEMATOCRIT: CPT

## 2023-07-10 PROCEDURE — 3700000001 HC ADD 15 MINUTES (ANESTHESIA): Performed by: STUDENT IN AN ORGANIZED HEALTH CARE EDUCATION/TRAINING PROGRAM

## 2023-07-10 PROCEDURE — 6370000000 HC RX 637 (ALT 250 FOR IP): Performed by: PERSONAL EMERGENCY RESPONSE ATTENDANT

## 2023-07-10 PROCEDURE — 2580000003 HC RX 258: Performed by: STUDENT IN AN ORGANIZED HEALTH CARE EDUCATION/TRAINING PROGRAM

## 2023-07-10 PROCEDURE — 87040 BLOOD CULTURE FOR BACTERIA: CPT

## 2023-07-10 PROCEDURE — 2500000003 HC RX 250 WO HCPCS: Performed by: PERSONAL EMERGENCY RESPONSE ATTENDANT

## 2023-07-10 PROCEDURE — 2580000003 HC RX 258

## 2023-07-10 PROCEDURE — 82803 BLOOD GASES ANY COMBINATION: CPT

## 2023-07-10 PROCEDURE — 96365 THER/PROPH/DIAG IV INF INIT: CPT

## 2023-07-10 PROCEDURE — 86901 BLOOD TYPING SEROLOGIC RH(D): CPT

## 2023-07-10 PROCEDURE — 94761 N-INVAS EAR/PLS OXIMETRY MLT: CPT

## 2023-07-10 PROCEDURE — 71275 CT ANGIOGRAPHY CHEST: CPT

## 2023-07-10 PROCEDURE — 6360000002 HC RX W HCPCS

## 2023-07-10 PROCEDURE — 3700000000 HC ANESTHESIA ATTENDED CARE: Performed by: STUDENT IN AN ORGANIZED HEALTH CARE EDUCATION/TRAINING PROGRAM

## 2023-07-10 PROCEDURE — 73630 X-RAY EXAM OF FOOT: CPT

## 2023-07-10 PROCEDURE — 73700 CT LOWER EXTREMITY W/O DYE: CPT

## 2023-07-10 PROCEDURE — 6360000002 HC RX W HCPCS: Performed by: STUDENT IN AN ORGANIZED HEALTH CARE EDUCATION/TRAINING PROGRAM

## 2023-07-10 PROCEDURE — 82330 ASSAY OF CALCIUM: CPT

## 2023-07-10 PROCEDURE — 96375 TX/PRO/DX INJ NEW DRUG ADDON: CPT

## 2023-07-10 PROCEDURE — 94640 AIRWAY INHALATION TREATMENT: CPT

## 2023-07-10 PROCEDURE — 73590 X-RAY EXAM OF LOWER LEG: CPT

## 2023-07-10 PROCEDURE — 2580000003 HC RX 258: Performed by: PERSONAL EMERGENCY RESPONSE ATTENDANT

## 2023-07-10 PROCEDURE — 2500000003 HC RX 250 WO HCPCS: Performed by: STUDENT IN AN ORGANIZED HEALTH CARE EDUCATION/TRAINING PROGRAM

## 2023-07-10 PROCEDURE — 94150 VITAL CAPACITY TEST: CPT

## 2023-07-10 PROCEDURE — 84295 ASSAY OF SERUM SODIUM: CPT

## 2023-07-10 PROCEDURE — 6370000000 HC RX 637 (ALT 250 FOR IP): Performed by: INTERNAL MEDICINE

## 2023-07-10 PROCEDURE — 86850 RBC ANTIBODY SCREEN: CPT

## 2023-07-10 PROCEDURE — 82435 ASSAY OF BLOOD CHLORIDE: CPT

## 2023-07-10 PROCEDURE — 71045 X-RAY EXAM CHEST 1 VIEW: CPT

## 2023-07-10 PROCEDURE — 2580000003 HC RX 258: Performed by: ANESTHESIOLOGY

## 2023-07-10 PROCEDURE — 80053 COMPREHEN METABOLIC PANEL: CPT

## 2023-07-10 PROCEDURE — 87070 CULTURE OTHR SPECIMN AEROBIC: CPT

## 2023-07-10 PROCEDURE — 36415 COLL VENOUS BLD VENIPUNCTURE: CPT

## 2023-07-10 PROCEDURE — 2000000000 HC ICU R&B

## 2023-07-10 PROCEDURE — 6370000000 HC RX 637 (ALT 250 FOR IP): Performed by: STUDENT IN AN ORGANIZED HEALTH CARE EDUCATION/TRAINING PROGRAM

## 2023-07-10 PROCEDURE — 2580000003 HC RX 258: Performed by: INTERNAL MEDICINE

## 2023-07-10 PROCEDURE — 36600 WITHDRAWAL OF ARTERIAL BLOOD: CPT

## 2023-07-10 PROCEDURE — 0Y6P0Z0 DETACHMENT AT RIGHT 1ST TOE, COMPLETE, OPEN APPROACH: ICD-10-PCS

## 2023-07-10 PROCEDURE — 6370000000 HC RX 637 (ALT 250 FOR IP)

## 2023-07-10 PROCEDURE — 99291 CRITICAL CARE FIRST HOUR: CPT | Performed by: INTERNAL MEDICINE

## 2023-07-10 PROCEDURE — 85025 COMPLETE CBC W/AUTO DIFF WBC: CPT

## 2023-07-10 PROCEDURE — 83605 ASSAY OF LACTIC ACID: CPT

## 2023-07-10 PROCEDURE — 99222 1ST HOSP IP/OBS MODERATE 55: CPT | Performed by: INTERNAL MEDICINE

## 2023-07-10 PROCEDURE — 6360000004 HC RX CONTRAST MEDICATION: Performed by: STUDENT IN AN ORGANIZED HEALTH CARE EDUCATION/TRAINING PROGRAM

## 2023-07-10 RX ORDER — FENTANYL CITRATE 0.05 MG/ML
50 INJECTION, SOLUTION INTRAMUSCULAR; INTRAVENOUS EVERY 10 MIN PRN
Status: DISCONTINUED | OUTPATIENT
Start: 2023-07-10 | End: 2023-07-13 | Stop reason: HOSPADM

## 2023-07-10 RX ORDER — MAGNESIUM SULFATE IN WATER 40 MG/ML
INJECTION, SOLUTION INTRAVENOUS
Status: COMPLETED
Start: 2023-07-10 | End: 2023-07-10

## 2023-07-10 RX ORDER — INSULIN LISPRO 100 [IU]/ML
0-4 INJECTION, SOLUTION INTRAVENOUS; SUBCUTANEOUS NIGHTLY
Status: DISCONTINUED | OUTPATIENT
Start: 2023-07-10 | End: 2023-07-14 | Stop reason: HOSPADM

## 2023-07-10 RX ORDER — SODIUM CHLORIDE 0.9 % (FLUSH) 0.9 %
5-40 SYRINGE (ML) INJECTION PRN
Status: DISCONTINUED | OUTPATIENT
Start: 2023-07-10 | End: 2023-07-14 | Stop reason: HOSPADM

## 2023-07-10 RX ORDER — BUPIVACAINE HYDROCHLORIDE 5 MG/ML
INJECTION, SOLUTION EPIDURAL; INTRACAUDAL PRN
Status: DISCONTINUED | OUTPATIENT
Start: 2023-07-10 | End: 2023-07-10 | Stop reason: ALTCHOICE

## 2023-07-10 RX ORDER — ACETAMINOPHEN 325 MG/1
650 TABLET ORAL EVERY 6 HOURS PRN
Status: DISCONTINUED | OUTPATIENT
Start: 2023-07-10 | End: 2023-07-14 | Stop reason: HOSPADM

## 2023-07-10 RX ORDER — IPRATROPIUM BROMIDE AND ALBUTEROL SULFATE 2.5; .5 MG/3ML; MG/3ML
1 SOLUTION RESPIRATORY (INHALATION) CONTINUOUS PRN
Status: DISCONTINUED | OUTPATIENT
Start: 2023-07-10 | End: 2023-07-10

## 2023-07-10 RX ORDER — NOREPINEPHRINE BITARTRATE 0.06 MG/ML
1-100 INJECTION, SOLUTION INTRAVENOUS CONTINUOUS
Status: DISCONTINUED | OUTPATIENT
Start: 2023-07-10 | End: 2023-07-11

## 2023-07-10 RX ORDER — LIDOCAINE HYDROCHLORIDE 10 MG/ML
INJECTION, SOLUTION EPIDURAL; INFILTRATION; INTRACAUDAL; PERINEURAL PRN
Status: DISCONTINUED | OUTPATIENT
Start: 2023-07-10 | End: 2023-07-10 | Stop reason: SDUPTHER

## 2023-07-10 RX ORDER — KETOROLAC TROMETHAMINE 15 MG/ML
15 INJECTION, SOLUTION INTRAMUSCULAR; INTRAVENOUS ONCE
Status: COMPLETED | OUTPATIENT
Start: 2023-07-10 | End: 2023-07-10

## 2023-07-10 RX ORDER — 0.9 % SODIUM CHLORIDE 0.9 %
1000 INTRAVENOUS SOLUTION INTRAVENOUS ONCE
Status: COMPLETED | OUTPATIENT
Start: 2023-07-10 | End: 2023-07-10

## 2023-07-10 RX ORDER — CLINDAMYCIN PHOSPHATE 600 MG/50ML
600 INJECTION INTRAVENOUS ONCE
Status: COMPLETED | OUTPATIENT
Start: 2023-07-10 | End: 2023-07-10

## 2023-07-10 RX ORDER — SODIUM CHLORIDE, SODIUM LACTATE, POTASSIUM CHLORIDE, AND CALCIUM CHLORIDE .6; .31; .03; .02 G/100ML; G/100ML; G/100ML; G/100ML
500 INJECTION, SOLUTION INTRAVENOUS ONCE
Status: COMPLETED | OUTPATIENT
Start: 2023-07-10 | End: 2023-07-10

## 2023-07-10 RX ORDER — ONDANSETRON 4 MG/1
4 TABLET, ORALLY DISINTEGRATING ORAL EVERY 8 HOURS PRN
Status: DISCONTINUED | OUTPATIENT
Start: 2023-07-10 | End: 2023-07-14 | Stop reason: HOSPADM

## 2023-07-10 RX ORDER — POLYETHYLENE GLYCOL 3350 17 G/17G
17 POWDER, FOR SOLUTION ORAL DAILY PRN
Status: DISCONTINUED | OUTPATIENT
Start: 2023-07-10 | End: 2023-07-14 | Stop reason: HOSPADM

## 2023-07-10 RX ORDER — METOCLOPRAMIDE HYDROCHLORIDE 5 MG/ML
10 INJECTION INTRAMUSCULAR; INTRAVENOUS
Status: ACTIVE | OUTPATIENT
Start: 2023-07-10 | End: 2023-07-11

## 2023-07-10 RX ORDER — HYDROMORPHONE HYDROCHLORIDE 1 MG/ML
0.5 INJECTION, SOLUTION INTRAMUSCULAR; INTRAVENOUS; SUBCUTANEOUS EVERY 10 MIN PRN
Status: DISCONTINUED | OUTPATIENT
Start: 2023-07-10 | End: 2023-07-13 | Stop reason: HOSPADM

## 2023-07-10 RX ORDER — PROPOFOL 10 MG/ML
INJECTION, EMULSION INTRAVENOUS PRN
Status: DISCONTINUED | OUTPATIENT
Start: 2023-07-10 | End: 2023-07-10 | Stop reason: SDUPTHER

## 2023-07-10 RX ORDER — SODIUM CHLORIDE 9 MG/ML
INJECTION, SOLUTION INTRAVENOUS PRN
Status: DISCONTINUED | OUTPATIENT
Start: 2023-07-10 | End: 2023-07-14 | Stop reason: HOSPADM

## 2023-07-10 RX ORDER — DEXTROSE MONOHYDRATE 100 MG/ML
INJECTION, SOLUTION INTRAVENOUS CONTINUOUS PRN
Status: DISCONTINUED | OUTPATIENT
Start: 2023-07-10 | End: 2023-07-14 | Stop reason: HOSPADM

## 2023-07-10 RX ORDER — LIDOCAINE HYDROCHLORIDE 20 MG/ML
INJECTION, SOLUTION EPIDURAL; INFILTRATION; INTRACAUDAL; PERINEURAL PRN
Status: DISCONTINUED | OUTPATIENT
Start: 2023-07-10 | End: 2023-07-10 | Stop reason: ALTCHOICE

## 2023-07-10 RX ORDER — MAGNESIUM SULFATE IN WATER 40 MG/ML
2000 INJECTION, SOLUTION INTRAVENOUS ONCE
Status: COMPLETED | OUTPATIENT
Start: 2023-07-10 | End: 2023-07-10

## 2023-07-10 RX ORDER — MEPERIDINE HYDROCHLORIDE 25 MG/ML
12.5 INJECTION INTRAMUSCULAR; INTRAVENOUS; SUBCUTANEOUS
Status: ACTIVE | OUTPATIENT
Start: 2023-07-10 | End: 2023-07-11

## 2023-07-10 RX ORDER — INSULIN GLARGINE 100 [IU]/ML
20 INJECTION, SOLUTION SUBCUTANEOUS NIGHTLY
Status: DISCONTINUED | OUTPATIENT
Start: 2023-07-10 | End: 2023-07-14 | Stop reason: HOSPADM

## 2023-07-10 RX ORDER — OXYCODONE HYDROCHLORIDE 5 MG/1
5 TABLET ORAL
Status: COMPLETED | OUTPATIENT
Start: 2023-07-10 | End: 2023-07-11

## 2023-07-10 RX ORDER — ONDANSETRON 2 MG/ML
4 INJECTION INTRAMUSCULAR; INTRAVENOUS
Status: ACTIVE | OUTPATIENT
Start: 2023-07-10 | End: 2023-07-11

## 2023-07-10 RX ORDER — SODIUM CHLORIDE 9 MG/ML
INJECTION, SOLUTION INTRAVENOUS
Status: COMPLETED
Start: 2023-07-10 | End: 2023-07-10

## 2023-07-10 RX ORDER — ONDANSETRON 2 MG/ML
4 INJECTION INTRAMUSCULAR; INTRAVENOUS EVERY 6 HOURS PRN
Status: DISCONTINUED | OUTPATIENT
Start: 2023-07-10 | End: 2023-07-14 | Stop reason: HOSPADM

## 2023-07-10 RX ORDER — DIPHENHYDRAMINE HYDROCHLORIDE 50 MG/ML
12.5 INJECTION INTRAMUSCULAR; INTRAVENOUS
Status: ACTIVE | OUTPATIENT
Start: 2023-07-10 | End: 2023-07-11

## 2023-07-10 RX ORDER — INSULIN LISPRO 100 [IU]/ML
0-8 INJECTION, SOLUTION INTRAVENOUS; SUBCUTANEOUS
Status: DISCONTINUED | OUTPATIENT
Start: 2023-07-10 | End: 2023-07-14 | Stop reason: HOSPADM

## 2023-07-10 RX ORDER — MIDODRINE HYDROCHLORIDE 5 MG/1
10 TABLET ORAL
Status: DISCONTINUED | OUTPATIENT
Start: 2023-07-10 | End: 2023-07-14

## 2023-07-10 RX ORDER — ENOXAPARIN SODIUM 100 MG/ML
30 INJECTION SUBCUTANEOUS 2 TIMES DAILY
Status: DISCONTINUED | OUTPATIENT
Start: 2023-07-10 | End: 2023-07-14 | Stop reason: HOSPADM

## 2023-07-10 RX ORDER — ACETAMINOPHEN 650 MG/1
650 SUPPOSITORY RECTAL EVERY 6 HOURS PRN
Status: DISCONTINUED | OUTPATIENT
Start: 2023-07-10 | End: 2023-07-14 | Stop reason: HOSPADM

## 2023-07-10 RX ORDER — SODIUM CHLORIDE, SODIUM LACTATE, POTASSIUM CHLORIDE, CALCIUM CHLORIDE 600; 310; 30; 20 MG/100ML; MG/100ML; MG/100ML; MG/100ML
INJECTION, SOLUTION INTRAVENOUS
Status: DISPENSED
Start: 2023-07-10 | End: 2023-07-10

## 2023-07-10 RX ORDER — SODIUM CHLORIDE 0.9 % (FLUSH) 0.9 %
5-40 SYRINGE (ML) INJECTION EVERY 12 HOURS SCHEDULED
Status: DISCONTINUED | OUTPATIENT
Start: 2023-07-10 | End: 2023-07-13 | Stop reason: HOSPADM

## 2023-07-10 RX ORDER — SODIUM CHLORIDE 0.9 % (FLUSH) 0.9 %
5-40 SYRINGE (ML) INJECTION PRN
Status: DISCONTINUED | OUTPATIENT
Start: 2023-07-10 | End: 2023-07-13 | Stop reason: HOSPADM

## 2023-07-10 RX ORDER — FENTANYL CITRATE 50 UG/ML
INJECTION, SOLUTION INTRAMUSCULAR; INTRAVENOUS PRN
Status: DISCONTINUED | OUTPATIENT
Start: 2023-07-10 | End: 2023-07-10 | Stop reason: SDUPTHER

## 2023-07-10 RX ORDER — MIDAZOLAM HYDROCHLORIDE 1 MG/ML
INJECTION INTRAMUSCULAR; INTRAVENOUS PRN
Status: DISCONTINUED | OUTPATIENT
Start: 2023-07-10 | End: 2023-07-10 | Stop reason: SDUPTHER

## 2023-07-10 RX ORDER — SODIUM CHLORIDE 9 MG/ML
INJECTION, SOLUTION INTRAVENOUS PRN
Status: DISCONTINUED | OUTPATIENT
Start: 2023-07-10 | End: 2023-07-13 | Stop reason: HOSPADM

## 2023-07-10 RX ORDER — SODIUM CHLORIDE 9 MG/ML
INJECTION, SOLUTION INTRAVENOUS CONTINUOUS
Status: DISCONTINUED | OUTPATIENT
Start: 2023-07-10 | End: 2023-07-11

## 2023-07-10 RX ORDER — MAGNESIUM HYDROXIDE 1200 MG/15ML
LIQUID ORAL CONTINUOUS PRN
Status: COMPLETED | OUTPATIENT
Start: 2023-07-10 | End: 2023-07-10

## 2023-07-10 RX ORDER — SODIUM CHLORIDE 0.9 % (FLUSH) 0.9 %
5-40 SYRINGE (ML) INJECTION EVERY 12 HOURS SCHEDULED
Status: DISCONTINUED | OUTPATIENT
Start: 2023-07-10 | End: 2023-07-14 | Stop reason: HOSPADM

## 2023-07-10 RX ORDER — ONDANSETRON 2 MG/ML
INJECTION INTRAMUSCULAR; INTRAVENOUS PRN
Status: DISCONTINUED | OUTPATIENT
Start: 2023-07-10 | End: 2023-07-10 | Stop reason: SDUPTHER

## 2023-07-10 RX ORDER — ACETAMINOPHEN 500 MG
1000 TABLET ORAL ONCE
Status: COMPLETED | OUTPATIENT
Start: 2023-07-10 | End: 2023-07-10

## 2023-07-10 RX ADMIN — INSULIN LISPRO 4 UNITS: 100 INJECTION, SOLUTION INTRAVENOUS; SUBCUTANEOUS at 09:08

## 2023-07-10 RX ADMIN — Medication 1000 ML: at 03:06

## 2023-07-10 RX ADMIN — CLINDAMYCIN PHOSPHATE 600 MG: 600 INJECTION, SOLUTION INTRAVENOUS at 03:53

## 2023-07-10 RX ADMIN — SODIUM CHLORIDE 1000 ML: 9 INJECTION, SOLUTION INTRAVENOUS at 03:18

## 2023-07-10 RX ADMIN — KETOROLAC TROMETHAMINE 15 MG: 15 INJECTION, SOLUTION INTRAMUSCULAR; INTRAVENOUS at 03:01

## 2023-07-10 RX ADMIN — MAGNESIUM SULFATE IN WATER 2000 MG: 40 INJECTION, SOLUTION INTRAVENOUS at 03:06

## 2023-07-10 RX ADMIN — PHENYLEPHRINE HYDROCHLORIDE 100 MCG: 10 INJECTION INTRAVENOUS at 07:47

## 2023-07-10 RX ADMIN — SODIUM CHLORIDE: 9 INJECTION, SOLUTION INTRAVENOUS at 09:05

## 2023-07-10 RX ADMIN — SODIUM CHLORIDE: 9 INJECTION, SOLUTION INTRAVENOUS at 07:31

## 2023-07-10 RX ADMIN — MAGNESIUM SULFATE HEPTAHYDRATE 2000 MG: 40 INJECTION, SOLUTION INTRAVENOUS at 03:06

## 2023-07-10 RX ADMIN — MIDAZOLAM HYDROCHLORIDE 2 MG: 1 INJECTION, SOLUTION INTRAMUSCULAR; INTRAVENOUS at 07:30

## 2023-07-10 RX ADMIN — MIDODRINE HYDROCHLORIDE 10 MG: 10 TABLET ORAL at 16:39

## 2023-07-10 RX ADMIN — IPRATROPIUM BROMIDE AND ALBUTEROL SULFATE 1 DOSE: 2.5; .5 SOLUTION RESPIRATORY (INHALATION) at 01:10

## 2023-07-10 RX ADMIN — SODIUM CHLORIDE: 9 INJECTION, SOLUTION INTRAVENOUS at 20:16

## 2023-07-10 RX ADMIN — VANCOMYCIN HYDROCHLORIDE 1250 MG: 1.25 INJECTION, POWDER, LYOPHILIZED, FOR SOLUTION INTRAVENOUS at 03:40

## 2023-07-10 RX ADMIN — SODIUM CHLORIDE 1000 ML: 9 INJECTION, SOLUTION INTRAVENOUS at 03:34

## 2023-07-10 RX ADMIN — INSULIN LISPRO 2 UNITS: 100 INJECTION, SOLUTION INTRAVENOUS; SUBCUTANEOUS at 16:40

## 2023-07-10 RX ADMIN — VANCOMYCIN HYDROCHLORIDE 1250 MG: 1.25 INJECTION, POWDER, LYOPHILIZED, FOR SOLUTION INTRAVENOUS at 16:39

## 2023-07-10 RX ADMIN — PHENYLEPHRINE HYDROCHLORIDE 100 MCG: 10 INJECTION INTRAVENOUS at 07:41

## 2023-07-10 RX ADMIN — Medication 5 MCG/MIN: at 23:05

## 2023-07-10 RX ADMIN — FENTANYL CITRATE 50 MCG: 50 INJECTION, SOLUTION INTRAMUSCULAR; INTRAVENOUS at 07:50

## 2023-07-10 RX ADMIN — INSULIN LISPRO 2 UNITS: 100 INJECTION, SOLUTION INTRAVENOUS; SUBCUTANEOUS at 12:25

## 2023-07-10 RX ADMIN — Medication 5 MCG/MIN: at 03:49

## 2023-07-10 RX ADMIN — SODIUM CHLORIDE 1000 ML: 9 INJECTION, SOLUTION INTRAVENOUS at 03:06

## 2023-07-10 RX ADMIN — SODIUM CHLORIDE, PRESERVATIVE FREE 10 ML: 5 INJECTION INTRAVENOUS at 20:20

## 2023-07-10 RX ADMIN — PIPERACILLIN AND TAZOBACTAM 3375 MG: 3; .375 INJECTION, POWDER, LYOPHILIZED, FOR SOLUTION INTRAVENOUS at 20:19

## 2023-07-10 RX ADMIN — IOPAMIDOL 75 ML: 612 INJECTION, SOLUTION INTRAVENOUS at 02:12

## 2023-07-10 RX ADMIN — ENOXAPARIN SODIUM 30 MG: 100 INJECTION SUBCUTANEOUS at 09:08

## 2023-07-10 RX ADMIN — ONDANSETRON 4 MG: 2 INJECTION INTRAMUSCULAR; INTRAVENOUS at 07:51

## 2023-07-10 RX ADMIN — LIDOCAINE HYDROCHLORIDE 40 MG: 10 INJECTION, SOLUTION EPIDURAL; INFILTRATION; INTRACAUDAL; PERINEURAL at 07:37

## 2023-07-10 RX ADMIN — PROPOFOL 200 MG: 10 INJECTION, EMULSION INTRAVENOUS at 07:37

## 2023-07-10 RX ADMIN — INSULIN GLARGINE 20 UNITS: 100 INJECTION, SOLUTION SUBCUTANEOUS at 20:19

## 2023-07-10 RX ADMIN — MIDODRINE HYDROCHLORIDE 10 MG: 10 TABLET ORAL at 12:27

## 2023-07-10 RX ADMIN — PIPERACILLIN AND TAZOBACTAM 3375 MG: 3; .375 INJECTION, POWDER, LYOPHILIZED, FOR SOLUTION INTRAVENOUS at 03:05

## 2023-07-10 RX ADMIN — PHENYLEPHRINE HYDROCHLORIDE 100 MCG: 10 INJECTION INTRAVENOUS at 08:03

## 2023-07-10 RX ADMIN — ACETAMINOPHEN 1000 MG: 500 TABLET ORAL at 03:00

## 2023-07-10 RX ADMIN — FENTANYL CITRATE 50 MCG: 50 INJECTION, SOLUTION INTRAMUSCULAR; INTRAVENOUS at 07:37

## 2023-07-10 RX ADMIN — PHENYLEPHRINE HYDROCHLORIDE 200 MCG: 10 INJECTION INTRAVENOUS at 07:53

## 2023-07-10 RX ADMIN — SODIUM CHLORIDE, POTASSIUM CHLORIDE, SODIUM LACTATE AND CALCIUM CHLORIDE 500 ML: 600; 310; 30; 20 INJECTION, SOLUTION INTRAVENOUS at 09:34

## 2023-07-10 RX ADMIN — PIPERACILLIN AND TAZOBACTAM 3375 MG: 3; .375 INJECTION, POWDER, LYOPHILIZED, FOR SOLUTION INTRAVENOUS at 12:28

## 2023-07-10 ASSESSMENT — ENCOUNTER SYMPTOMS
ABDOMINAL PAIN: 0
BLOOD IN STOOL: 0
COUGH: 0
SORE THROAT: 0
SHORTNESS OF BREATH: 1
RHINORRHEA: 0
VOMITING: 0
COLOR CHANGE: 0
DIARRHEA: 0
NAUSEA: 1

## 2023-07-10 ASSESSMENT — PAIN DESCRIPTION - LOCATION: LOCATION: GENERALIZED

## 2023-07-10 ASSESSMENT — PAIN - FUNCTIONAL ASSESSMENT
PAIN_FUNCTIONAL_ASSESSMENT: 0-10
PAIN_FUNCTIONAL_ASSESSMENT: NONE - DENIES PAIN

## 2023-07-10 ASSESSMENT — PAIN DESCRIPTION - ORIENTATION: ORIENTATION: RIGHT;LEFT

## 2023-07-10 ASSESSMENT — PAIN DESCRIPTION - DESCRIPTORS: DESCRIPTORS: ACHING

## 2023-07-10 ASSESSMENT — PAIN SCALES - GENERAL: PAINLEVEL_OUTOF10: 4

## 2023-07-10 NOTE — CARE COORDINATION
Issues/Barriers to RETURNING to current housing: MEDICAL CLEARANCE  Potential Assistance needed at discharge: N/A            Potential DME:    Patient expects to discharge to: [address] [facility] for transportation at discharge:      Financial    Payor: Brooks Mcneil / Plan: Riki Henderson JEOVANY / Product Type: *No Product type* /     Does insurance require precert for SNF: Yes    Potential assistance Purchasing Medications:    Meds-to-Beds request:        [facility] [place], [address] [facility] [phone] - f [phone]  [address] [facility]  Phone: [phone] Fax: [phone]    [address] [phone] - F [phone]  [address] [place] [address]  Phone: [phone] Fax: [phone]    [id number] [address]  Phone: [phone] Fax: [phone]      Notes:    Factors facilitating achievement of predicted outcomes: Motivated, Cooperative, Pleasant, Sense of humor, and Good insight into deficits    Barriers to discharge: Medical complications    Additional Case Management Notes: Pt lives at home with wife. Independent at baseline; no Home O2; no dialysis; No DME; Discuss DC plan with pt. Pt agree with [address] [address] with wife. Discuss DC plan with pt. Pt agree with DC plan     The Plan for Transition of Care is related to the following treatment goals of Septicemia ([address]) [A41.9]  Open wound of toe, initial encounter [S91.109A]  Sepsis ([address]) [A41.9]  Gas gangrene of foot ([address]) [F37.0]    IF APPLICABLE: The Patient and/or patient representative Dunia Campos and his family were provided with a choice of provider and agrees with the discharge plan.  Freedom of choice list with basic dialogue that supports the patient's individualized plan of care/goals and shares the quality data associated with the providers was

## 2023-07-10 NOTE — PLAN OF CARE
PODIATRY PLAN OF CARE    Pt is s/p Right hallux incision and drainage. DOS 7/10/23 POD 0. See brief op and op note for more detail     - Pt is to [address] to heel of RLE  - Please leave the pts post operative dressing intact. Dressing should remain clean, dry, and intact. Podiatry will perform dressing changes. - If strike through occurs to the dressing. First elevate to foot on pillows and reinforce dressing. Dressing may be reinforced with ABD, Kerlix, Ace. Continue to monitor.   - Please hold anticoagulation till the following morning unless deemed to do so otherwise. - Pain management per the primary team   - Antibiotics per primary/ID team   - Pt to return to floor when deemed appropriate. - Podiatry will continue to follow.      Ramesh Olsen DPM PGY-3

## 2023-07-10 NOTE — FLOWSHEET NOTE
Patient underwent I&D of R first toe this am. He has no complaints of pain since pain med given per prn order this am. He is alert and oriented, wife at bedside, voiding with urinal with no difficulties. He states that his last bm prior to admission was 7/9/23. Levophed has been off since 1400 this afternoon. Midodrine started 10mg TID. Seems to be effective. He ate % of each meal this day. He has no questions, needs or concerns at this time.

## 2023-07-10 NOTE — BRIEF OP NOTE
Brief Postoperative Note      Patient: Yovana Badillo  YOB: 1960  MRN: [id number]    Date of Procedure: 7/10/2023    Pre-Op Diagnosis Codes:     * Open wound of toe, initial encounter [S91.109A]     * Gas gangrene of foot ([address]) [A48.0]    Post-Op Diagnosis: Same       Procedure:    Incision and drainage of the right hallux     Surgeon(s):  Jostin Castro DPM    Assistant:  Sanjay Sauer DPM PGY-2    Anesthesia: MAC w/ local field     Estimated Blood Loss (mL): Minimal    Complications: None    Specimens:   ID Type Source Tests Collected by Time Destination   1 : RIGHT GREAT TOE Swab Toe CULTURE, SURGICAL Jostin Castro DPM 7/10/2023 6131        Implants:  * No implants in log *      Drains: * No LDAs found *    Findings: see op note    Electronically signed by Calli Ramirez DPM on 7/10/2023 at 8:12 AM

## 2023-07-10 NOTE — H&P
07/09/2023 11:45 PM    LYMPHSABS 0.4 07/09/2023 11:45 PM    EOSABS 0.0 07/09/2023 11:45 PM    BASOSABS 0.0 07/09/2023 11:45 PM     CMP:    Lab Results   Component Value Date/Time     07/09/2023 11:45 PM    K 4.2 07/09/2023 11:45 PM     07/09/2023 11:45 PM    CO2 21 07/09/2023 11:45 PM    BUN 24 07/09/2023 11:45 PM    CREATININE 1.0 07/10/2023 01:58 AM    CREATININE 1.02 07/09/2023 11:45 PM    GFRAA >60.0 09/08/2022 08:31 AM    LABGLOM >60 07/10/2023 01:58 AM    GLUCOSE 268 07/09/2023 11:45 PM    GLUCOSE 328 01/30/2012 08:23 AM    PROT 6.8 07/09/2023 11:45 PM    LABALBU 3.7 07/09/2023 11:45 PM    LABALBU 4.1 01/30/2012 08:23 AM    CALCIUM 8.9 07/09/2023 11:45 PM    BILITOT 0.4 07/09/2023 11:45 PM    ALKPHOS 84 07/09/2023 11:45 PM    AST 12 07/09/2023 11:45 PM    ALT 11 07/09/2023 11:45 PM       Radiology: XR FOOT RIGHT (MIN 3 VIEWS)    Result Date: 7/10/2023  EXAMINATION: THREE XRAY VIEWS OF THE RIGHT FOOT 7/10/2023 12:04 am COMPARISON: None. HISTORY: ORDERING SYSTEM PROVIDED HISTORY: great toe infection TECHNOLOGIST PROVIDED HISTORY: Reason for exam:->great toe infection What reading provider will be dictating this exam?->CRC FINDINGS: Three views right foot with attention to the great toe demonstrate multiple small gas collections primarily along the medial aspect of the distal phalanx of the right great toe with marked soft tissue swelling consistent with gas gangrene. There is no definite evidence of periostitis. There remainder of the soft tissues and osseous structures appear unremarkable. There are vascular calcifications with moderate plantar calcaneal spur. Multiple small gas collections along the medial great toe and plantar aspect consistent with gas gangrene without evidence of gross periostitis.      XR CHEST PORTABLE    Result Date: 7/10/2023  EXAMINATION: ONE XRAY VIEW OF THE CHEST 7/10/2023 12:04 am COMPARISON: 01/23/2018 HISTORY: ORDERING SYSTEM PROVIDED HISTORY: chills TECHNOLOGIST

## 2023-07-10 NOTE — ACP (ADVANCE CARE PLANNING)
Advance Care Planning   Healthcare Decision Maker:    Primary Decision Maker: Jane Moy - Spouse - [phone]    Click here to complete Healthcare Decision Makers including selection of the Healthcare Decision Maker Relationship (ie \"Primary\"). Today we documented Decision Maker(s) consistent with Legal Next of Kin hierarchy.        If the relationship to the patient does NOT follow our state's Next of Kin hierarchy, the patient MUST complete an ACP Document to allow him/her to act on the patient's behalf. :        Electronically signed by TALON Mckinnon on 7/10/2023 at 9:42 AM

## 2023-07-10 NOTE — ED PROVIDER NOTES
[facility] ED  eMERGENCY dEPARTMENT eNCOUnter      Pt Name: Moriah Baker  MRN: [id number]  [address] [facility] [place] 1960  Date of evaluation: 7/9/2023  Provider: SONY Woods      HISTORY OF PRESENT ILLNESS    Moriah Baker is a 61 y.o. male with PMHx of ED, GERD, hyperlipidemia, hypertension, EVELYN, DM 2 presents to the emergency department with shortness of breath. Patient states for 3 days he has been having body aches, chills, minimal shortness of breath, nausea, and fatigue. He has had decreased oral intake and today has had generalized weakness with difficulty standing. For few weeks patient has had pain and redness to right great toe. June 23 saw family doctor and was placed on prednisone for possible gout due to elevated uric acid levels and Bactrim. Wound seems unchanged. Patient denies any fevers, upper respiratory symptoms, cough, chest pain, wheezing, abdominal pain, vomiting, diarrhea, constipation, urinary symptoms. No purulent drainage coming from toe wound. HPI    Nursing Notes were reviewed. REVIEW OF SYSTEMS       Review of Systems   Constitutional:  Positive for appetite change and chills. Negative for fever. HENT:  Negative for congestion, rhinorrhea and sore throat. Respiratory:  Positive for shortness of breath. Negative for cough. Cardiovascular:  Negative for chest pain. Gastrointestinal:  Positive for nausea. Negative for abdominal pain, blood in stool, diarrhea and vomiting. Genitourinary:  Negative for difficulty urinating. Musculoskeletal:  Negative for neck stiffness. Skin:  Positive for wound. Negative for color change and rash. Neurological:  Positive for weakness and light-headedness. Negative for dizziness, syncope, numbness and headaches. All other systems reviewed and are negative.           PAST MEDICAL HISTORY     Past Medical History:   Diagnosis Date    ED (erectile dysfunction)     GERD (gastroesophageal reflux disease)     History of

## 2023-07-10 NOTE — ED TRIAGE NOTES
Pt presents to ED via EMS c/o 3 days generalized illness. Pt c/o body aches, chills, \"some SOB\", nausea, weakness and fatigue.  Pt a&o x4, calm and cooperative on arrival.

## 2023-07-10 NOTE — ANESTHESIA POSTPROCEDURE EVALUATION
Department of Anesthesiology  Postprocedure Note    Patient: Yovana Badillo  MRN: [id number]  YOB: 1960  Date of evaluation: 7/10/2023      Procedure Summary     Date: 07/10/23 Room / Location: [address]    Anesthesia Start: 0730 Anesthesia Stop: 0825    Procedure: RIGHT FIRST DIGIT AMPUTATION HALLUX INVOLVING TISSUE AND NONVIABLE BONE 7:30 AM 1ST CASE  PULSE LAVAGE IN ROOM : ICU:7 (Right) Diagnosis:       Open wound of toe, initial encounter      Gas gangrene of foot ([address])      (Open wound of toe, initial encounter [S91.109A])      (Gas gangrene of foot ([address]) [A48.0])    Surgeons: Jostin Castro DPM Responsible Provider: Steve Rojo MD    Anesthesia Type: general ASA Status: 4          Anesthesia Type: No value filed.     Jean Phase I:      Jean Phase II:        Anesthesia Post Evaluation    Patient location during evaluation: ICU  Patient participation: complete - patient participated  Level of consciousness: awake  Airway patency: patent  Nausea & Vomiting: no nausea and no vomiting  Complications: no  Cardiovascular status: hemodynamically stable and vasoactive/inotropes  Respiratory status: acceptable  Hydration status: euvolemic

## 2023-07-10 NOTE — DISCHARGE INSTR - COC
Continuity of Care Form    Patient Name: Earl Weiss   :  1960  MRN:  [id number]    Admit date:  2023  Discharge date:  ***    Code Status Order: Full Code   Advance Directives:     Admitting Physician:   Mandi Painting MD  PCP: Lydia Wynn, APRN - CNP    Discharging Nurse: [facility] Unit/Room#: MLOZ OR Pool/NONE  Discharging Unit Phone Number: ***    Emergency Contact:   Extended Emergency Contact Information  Primary Emergency Contact: Mary Jo Mahajanfrancisco  Address: [address] [place] [address] of [address] Phone: [phone]  Work Phone: [phone]  Mobile Phone: [phone]  Relation: Spouse    Past Surgical History:  Past Surgical History:   Procedure Laterality Date    COLONOSCOPY  2013    SLEEVE GASTRECTOMY  2018    [address] EXTRACTION Left 2014    left lower widom tooth extraction       Immunization History:   Immunization History   Administered Date(s) Administered    Hep A, HAVRIX, VAQTA, (age 17m-24y), IM, 0.5mL 2009    Hep A, HAVRIX, VAQTA, (age 19y+), IM, 1mL 2008    Hepatitis B 2008, 2008, 2009    Influenza A (R7Y2-05) Vaccine PF IM 2009    Influenza Virus Vaccine 2012, 10/15/2013, 2018    Influenza Whole 2009, 10/15/2013    Influenza, AFLURIA (age 1 yrs+), FLUZONE, (age 10 mo+), MDV, 0.5mL 2018, 10/06/2020    Influenza, FLUARIX, FLULAVAL, FLUZONE (age 10 mo+) AND AFLURIA, (age 1 y+), PF, 0.5mL 2022    Influenza, FLUCELVAX, (age 10 mo+), MDCK, PF, 0.5mL 2018, 2019    MMR, Riaz Mckinney, M-M-R II, (age 12m+), SC, 0.5mL 2008    Pneumococcal, PCV20, PREVNAR 21, (age 18y+), IM, 0.5mL 2022    Pneumococcal, PPSV23, PNEUMOVAX 23, (age 2y+), SC/IM, 0.5mL 2019    Polio Virus Vaccine 2008    TDaP, ADACEL (age 6y-58y), BOOSTRIX (age 10y+), IM, 0.5mL 2008, 2018    Typhoid Vi capsular

## 2023-07-10 NOTE — PLAN OF CARE
[facility] MEDICINE AND SURGERY   PLAN OF CARE    NAME: Robert Teixeira  MRN: [id number]  DATE: July 10, 2023  TIME: 4:09 AM    PLAN AND RECOMMENDATIONS:        62 yo male with PMH of T2DM presenting with gas gangrene of right hallux. Patient is vitally unstable, consistent with septic shock in the setting of worsening diabetic foot infection.    - Plan for OR this morning for RIGHT foot hallux amputation and debridement of all non-viable tissue and bone. - Please have the patient medically clear and optimize the patient for surgery. Please have this stated in the chart.  - NPO now. - Please obtain type and screen.   - Stat CT right foot and ankle pending  - Antibiotic coverage: continue vanco/zosyn  - Recommend updated ESR/CRP     Garry Pal DPM PGY-2  July 10, 2023  4:09 AM

## 2023-07-11 LAB
A BAUMANNII DNA BLD POS QL NAA+NON-PROBE: NOT DETECTED
ALBUMIN SERPL-MCNC: 2.7 G/DL (ref 3.5–4.6)
ANION GAP SERPL CALCULATED.3IONS-SCNC: 9 MEQ/L (ref 9–15)
BASOPHILS # BLD: 0 K/UL (ref 0–0.2)
BASOPHILS NFR BLD: 0.5 %
BUN SERPL-MCNC: 20 MG/DL (ref 8–23)
C ALBICANS DNA BLD POS QL NAA+NON-PROBE: NOT DETECTED
C AURIS DNA BLD POS QL NAA+PROBE: NOT DETECTED
C GLABRATA DNA BLD POS QL NAA+NON-PROBE: NOT DETECTED
C KRUSEI DNA BLD POS QL NAA+NON-PROBE: NOT DETECTED
C PARAP DNA BLD POS QL NAA+NON-PROBE: NOT DETECTED
C TROPICLS DNA BLD POS QL NAA+NON-PROBE: NOT DETECTED
CALCIUM SERPL-MCNC: 8.1 MG/DL (ref 8.5–9.9)
CHLORIDE SERPL-SCNC: 113 MEQ/L (ref 95–107)
CO2 SERPL-SCNC: 21 MEQ/L (ref 20–31)
CREAT SERPL-MCNC: 0.88 MG/DL (ref 0.7–1.2)
CRYPTOCOCCUS NEOFORMANS/GATTII BY PCR: NOT DETECTED
E CLOAC COMP DNA BLD POS NAA+NON-PROBE: NOT DETECTED
E COLI DNA BLD POS QL NAA+NON-PROBE: NOT DETECTED
E FAECALIS DNA BLD POS QL NAA+PROBE: NOT DETECTED
E FAECIUM DNA BLD POS QL NAA+PROBE: NOT DETECTED
ENTEROBACT DNA BLD POS QL NAA+NON-PROBE: NOT DETECTED
ENTEROCOC DNA BLD POS QL NAA+NON-PROBE: NOT DETECTED
EOSINOPHIL # BLD: 0 K/UL (ref 0–0.7)
EOSINOPHIL NFR BLD: 0 %
ERYTHROCYTE [DISTWIDTH] IN BLOOD BY AUTOMATED COUNT: 13.6 % (ref 11.5–14.5)
GLUCOSE BLD-MCNC: 119 MG/DL (ref 70–99)
GLUCOSE BLD-MCNC: 169 MG/DL (ref 70–99)
GLUCOSE BLD-MCNC: 195 MG/DL (ref 70–99)
GLUCOSE BLD-MCNC: 230 MG/DL (ref 70–99)
GLUCOSE SERPL-MCNC: 120 MG/DL (ref 70–99)
GN BLD CULTURE PNL BLD POS NAA+PROBE: NOT DETECTED
GP B STREP DNA BLD POS QL NAA+NON-PROBE: NOT DETECTED
HCT VFR BLD AUTO: 32 % (ref 42–52)
HGB BLD-MCNC: 10.9 G/DL (ref 14–18)
K OXYTOCA DNA BLD POS QL NAA+NON-PROBE: NOT DETECTED
K PNEUMON DNA SPEC QL NAA+PROBE: NOT DETECTED
K. AEROGENES DNA SPEC QL NAA+PROBE: NOT DETECTED
L MONOCYTOG DNA BLD POS QL NAA+NON-PROBE: NOT DETECTED
LYMPHOCYTES # BLD: 1 K/UL (ref 1–4.8)
LYMPHOCYTES NFR BLD: 16.9 %
MCH RBC QN AUTO: 30.3 PG (ref 27–31.3)
MCHC RBC AUTO-ENTMCNC: 34 % (ref 33–37)
MCV RBC AUTO: 89.1 FL (ref 79–92.2)
MONOCYTES # BLD: 0.7 K/UL (ref 0.2–0.8)
MONOCYTES NFR BLD: 10.6 %
N MEN DNA BLD POS QL NAA+NON-PROBE: NOT DETECTED
NEUTROPHILS # BLD: 4.4 K/UL (ref 1.4–6.5)
NEUTS SEG NFR BLD: 72 %
P AERUGINOSA DNA BLD POS NAA+NON-PROBE: NOT DETECTED
PERFORMED ON: ABNORMAL
PHOSPHATE SERPL-MCNC: 2.6 MG/DL (ref 2.3–4.8)
PLATELET # BLD AUTO: 135 K/UL (ref 130–400)
POTASSIUM SERPL-SCNC: 4 MEQ/L (ref 3.4–4.9)
PROTEUS SP DNA BLD POS QL NAA+NON-PROBE: NOT DETECTED
RBC # BLD AUTO: 3.6 M/UL (ref 4.7–6.1)
S AUREUS DNA BLD POS QL NAA+NON-PROBE: NOT DETECTED
S AUREUS+CONS DNA BLD POS NAA+NON-PROBE: NOT DETECTED
S EPIDERMIDIS DNA BLD POS QL NAA+PROBE: NOT DETECTED
S LUGDUNENSIS DNA BLD POS QL NAA+PROBE: NOT DETECTED
S MALTOPH DNA BLD POS QL NAA+PROBE: NOT DETECTED
S MARCESCENS DNA BLD POS NAA+NON-PROBE: NOT DETECTED
S PNEUM DNA BLD POS QL NAA+NON-PROBE: NOT DETECTED
S PYO DNA BLD POS QL NAA+NON-PROBE: NOT DETECTED
SALMONELLA DNA BLD POS QL NAA+PROBE: NOT DETECTED
SODIUM SERPL-SCNC: 143 MEQ/L (ref 135–144)
STREPTOCOCCUS DNA BLD POS NAA+NON-PROBE: NOT DETECTED
VANCOMYCIN SERPL-MCNC: 9.1 UG/ML (ref 10–40)
WBC # BLD AUTO: 6.2 K/UL (ref 4.8–10.8)

## 2023-07-11 PROCEDURE — 2700000000 HC OXYGEN THERAPY PER DAY

## 2023-07-11 PROCEDURE — 6370000000 HC RX 637 (ALT 250 FOR IP): Performed by: INTERNAL MEDICINE

## 2023-07-11 PROCEDURE — 6370000000 HC RX 637 (ALT 250 FOR IP): Performed by: NURSE PRACTITIONER

## 2023-07-11 PROCEDURE — 2580000003 HC RX 258: Performed by: ANESTHESIOLOGY

## 2023-07-11 PROCEDURE — 6360000002 HC RX W HCPCS

## 2023-07-11 PROCEDURE — 80202 ASSAY OF VANCOMYCIN: CPT

## 2023-07-11 PROCEDURE — 6370000000 HC RX 637 (ALT 250 FOR IP): Performed by: ANESTHESIOLOGY

## 2023-07-11 PROCEDURE — 36415 COLL VENOUS BLD VENIPUNCTURE: CPT

## 2023-07-11 PROCEDURE — 85025 COMPLETE CBC W/AUTO DIFF WBC: CPT

## 2023-07-11 PROCEDURE — 6370000000 HC RX 637 (ALT 250 FOR IP)

## 2023-07-11 PROCEDURE — 2580000003 HC RX 258: Performed by: INTERNAL MEDICINE

## 2023-07-11 PROCEDURE — 6360000002 HC RX W HCPCS: Performed by: ANESTHESIOLOGY

## 2023-07-11 PROCEDURE — 80069 RENAL FUNCTION PANEL: CPT

## 2023-07-11 PROCEDURE — 99291 CRITICAL CARE FIRST HOUR: CPT | Performed by: INTERNAL MEDICINE

## 2023-07-11 PROCEDURE — 2580000003 HC RX 258

## 2023-07-11 PROCEDURE — 2000000000 HC ICU R&B

## 2023-07-11 RX ORDER — SODIUM CHLORIDE, SODIUM LACTATE, POTASSIUM CHLORIDE, CALCIUM CHLORIDE 600; 310; 30; 20 MG/100ML; MG/100ML; MG/100ML; MG/100ML
INJECTION, SOLUTION INTRAVENOUS CONTINUOUS
Status: DISCONTINUED | OUTPATIENT
Start: 2023-07-11 | End: 2023-07-12

## 2023-07-11 RX ADMIN — PIPERACILLIN AND TAZOBACTAM 3375 MG: 3; .375 INJECTION, POWDER, LYOPHILIZED, FOR SOLUTION INTRAVENOUS at 11:34

## 2023-07-11 RX ADMIN — INSULIN LISPRO 2 UNITS: 100 INJECTION, SOLUTION INTRAVENOUS; SUBCUTANEOUS at 11:34

## 2023-07-11 RX ADMIN — HYDROMORPHONE HYDROCHLORIDE 0.5 MG: 1 INJECTION, SOLUTION INTRAMUSCULAR; INTRAVENOUS; SUBCUTANEOUS at 01:48

## 2023-07-11 RX ADMIN — SODIUM CHLORIDE, POTASSIUM CHLORIDE, SODIUM LACTATE AND CALCIUM CHLORIDE: 600; 310; 30; 20 INJECTION, SOLUTION INTRAVENOUS at 18:07

## 2023-07-11 RX ADMIN — MIDODRINE HYDROCHLORIDE 10 MG: 10 TABLET ORAL at 11:34

## 2023-07-11 RX ADMIN — SODIUM CHLORIDE, PRESERVATIVE FREE 10 ML: 5 INJECTION INTRAVENOUS at 07:56

## 2023-07-11 RX ADMIN — MIDODRINE HYDROCHLORIDE 10 MG: 10 TABLET ORAL at 07:55

## 2023-07-11 RX ADMIN — INSULIN GLARGINE 20 UNITS: 100 INJECTION, SOLUTION SUBCUTANEOUS at 20:34

## 2023-07-11 RX ADMIN — ACETAMINOPHEN 650 MG: 325 TABLET ORAL at 20:26

## 2023-07-11 RX ADMIN — VANCOMYCIN HYDROCHLORIDE 1250 MG: 1.25 INJECTION, POWDER, LYOPHILIZED, FOR SOLUTION INTRAVENOUS at 04:03

## 2023-07-11 RX ADMIN — PIPERACILLIN AND TAZOBACTAM 3375 MG: 3; .375 INJECTION, POWDER, LYOPHILIZED, FOR SOLUTION INTRAVENOUS at 04:02

## 2023-07-11 RX ADMIN — SODIUM CHLORIDE, PRESERVATIVE FREE 10 ML: 5 INJECTION INTRAVENOUS at 20:37

## 2023-07-11 RX ADMIN — PIPERACILLIN AND TAZOBACTAM 3375 MG: 3; .375 INJECTION, POWDER, LYOPHILIZED, FOR SOLUTION INTRAVENOUS at 20:46

## 2023-07-11 RX ADMIN — VANCOMYCIN HYDROCHLORIDE 1250 MG: 1.25 INJECTION, POWDER, LYOPHILIZED, FOR SOLUTION INTRAVENOUS at 18:01

## 2023-07-11 RX ADMIN — SODIUM CHLORIDE, POTASSIUM CHLORIDE, SODIUM LACTATE AND CALCIUM CHLORIDE: 600; 310; 30; 20 INJECTION, SOLUTION INTRAVENOUS at 08:08

## 2023-07-11 RX ADMIN — SODIUM CHLORIDE, PRESERVATIVE FREE 10 ML: 5 INJECTION INTRAVENOUS at 20:36

## 2023-07-11 RX ADMIN — SODIUM CHLORIDE, PRESERVATIVE FREE 10 ML: 5 INJECTION INTRAVENOUS at 07:55

## 2023-07-11 RX ADMIN — OXYCODONE HYDROCHLORIDE 5 MG: 5 TABLET ORAL at 07:55

## 2023-07-11 RX ADMIN — ENOXAPARIN SODIUM 30 MG: 100 INJECTION SUBCUTANEOUS at 20:30

## 2023-07-11 RX ADMIN — SODIUM CHLORIDE: 9 INJECTION, SOLUTION INTRAVENOUS at 05:43

## 2023-07-11 RX ADMIN — ENOXAPARIN SODIUM 30 MG: 100 INJECTION SUBCUTANEOUS at 07:55

## 2023-07-11 ASSESSMENT — PAIN SCALES - GENERAL
PAINLEVEL_OUTOF10: 8
PAINLEVEL_OUTOF10: 2
PAINLEVEL_OUTOF10: 2
PAINLEVEL_OUTOF10: 6
PAINLEVEL_OUTOF10: 2
PAINLEVEL_OUTOF10: 2

## 2023-07-11 ASSESSMENT — PAIN DESCRIPTION - ORIENTATION
ORIENTATION: RIGHT
ORIENTATION: RIGHT

## 2023-07-11 ASSESSMENT — ENCOUNTER SYMPTOMS
COUGH: 0
SHORTNESS OF BREATH: 0
DIARRHEA: 0

## 2023-07-11 ASSESSMENT — PAIN DESCRIPTION - PAIN TYPE: TYPE: SURGICAL PAIN

## 2023-07-11 ASSESSMENT — PAIN DESCRIPTION - DESCRIPTORS
DESCRIPTORS: THROBBING
DESCRIPTORS: ACHING

## 2023-07-11 ASSESSMENT — PAIN DESCRIPTION - LOCATION
LOCATION: FOOT
LOCATION: FOOT

## 2023-07-11 ASSESSMENT — PAIN DESCRIPTION - ONSET: ONSET: GRADUAL

## 2023-07-11 ASSESSMENT — PAIN DESCRIPTION - FREQUENCY: FREQUENCY: CONTINUOUS

## 2023-07-11 ASSESSMENT — PAIN - FUNCTIONAL ASSESSMENT: PAIN_FUNCTIONAL_ASSESSMENT: ACTIVITIES ARE NOT PREVENTED

## 2023-07-11 NOTE — CARE COORDINATION
ICU team quality round done this am. We have new order for PT/OT eval and pt to have boot so he can walk and we will assess for any dc needs. He lives with spouse and works prior. I sent IV check also and will monitor for any needs.

## 2023-07-11 NOTE — PLAN OF CARE
Problem: Discharge Planning  Goal: Discharge to home or other facility with appropriate resources  Outcome: Progressing  Flowsheets (Taken 7/10/2023 2000)  Discharge to home or other facility with appropriate resources:   Identify barriers to discharge with patient and caregiver   Arrange for needed discharge resources and transportation as appropriate     Problem: Pain  Goal: Verbalizes/displays adequate comfort level or baseline comfort level  Outcome: Progressing     Problem: Safety - Adult  Goal: Free from fall injury  Outcome: Progressing     Problem: Chronic Conditions and Co-morbidities  Goal: Patient's chronic conditions and co-morbidity symptoms are monitored and maintained or improved  Outcome: Progressing  Flowsheets (Taken 7/10/2023 2000)  Care Plan - Patient's Chronic Conditions and Co-Morbidity Symptoms are Monitored and Maintained or Improved:   Monitor and assess patient's chronic conditions and comorbid symptoms for stability, deterioration, or improvement   Collaborate with multidisciplinary team to address chronic and comorbid conditions and prevent exacerbation or deterioration     Problem: Skin/Tissue Integrity  Goal: Absence of new skin breakdown  Description: 1. Monitor for areas of redness and/or skin breakdown  2. Assess vascular access sites hourly  3. Every 4-6 hours minimum:  Change oxygen saturation probe site  4. Every 4-6 hours:  If on nasal continuous positive airway pressure, respiratory therapy assess nares and determine need for appliance change or resting period.   Outcome: Progressing

## 2023-07-11 NOTE — CARE COORDINATION
IV BENEFIT REQUEST FORM    FAX FROM: [facility]                        Zhao Galloway, [facility]    REQUESTED BY: Electronically signed by Joses Reyes on 2023 at 11:24 AM                                               RN/C3: PHONE: [phone])     DATE:/TIME OF REQUEST: 23  TIME: 11:24 AM      TO: [address]      FAX TO: [phone]    PHONE: [phone]     THIS PATIENT HAS BEEN IDENTIFIED TO POSSIBLY NEED LONG TERM IV'S. PLEASE CHECK INSURANCE COVERAGE FOR THE FOLLOWING PT/DRUGS. PATIENT'S NAME: MATT GERARDO                              ROOM: [place] [address]   PATIENT'S : 1960  PATIENT ADDRESS: [address] [address]  SSN:    ([id number])     PAYOR NAME:  Payor: Margaret Joseph / Plan: Azam Dubois JEOVANY / Product Type: *No Product type* /      ZOSYN 3,375MG IV Q8HR   VANCO 1,250MG IV Q12 HR     __________ CHECK HERE IF PT HAS NO INSURANCE AND REQUESTING SELF PAY COST. *IF [facility] IS NOT A PROVIDER FOR THIS PATIENT, PLEASE FORWARD INFO VIA FAX TO [facility] @ [phone],(PHONE NUMBER: [phone]) TO RUN BENEFIT VERIFICATION AND NOTIFY THE ABOVE C3 OF THIS PLAN. (FAX FACE SHEET WITH DEMOGRAPHICS AND INSURANCE INFO WITH THIS FORM.)  PLEASE FAX BENEFIT INFO TO: THE [address] [phone]    This message is intended only for the use of the individual or entity to which it is addressed and may contain information that is privileged, confidential, and exempt from disclosure under applicable law. If the reader of the notice is not intended recipient of the employee/agent responsible for delivering the message to the intended recipient, you are hereby notified than any dissemination, distribution or copying of this communication is strictly prohibited. Please contact the sender for further instructions on handling the information.

## 2023-07-11 NOTE — CONSULTS
Infectious Disease     Patient Name: Nicholas Wallace  Date: 7/10/2023  YOB: 1960  Medical Record Number: [id number]        Chief Complaint   Patient presents with    Illness     Body aches; chills x3 days    Shortness of Breath     X 3 days          History of Present Illness:  Patient presents with chills and achiness, flu like illness last several days. Progressively more week. Toe has been more swollen last 2 weeks. Treated for gout with worsening. Found to be hemodynamically unstable, placed in ICU. Seen by podiatry .  There was concern on imaging of gas gangrene   From Op note  Pre-Op Diagnosis Codes:     * Open wound of toe, initial encounter [S91.109A]     * Gas gangrene of foot ([address]) [A48.0]     Post-Op Diagnosis: Same       Procedure(s):  RIGHT FIRST DIGIT AMPUTATION HALLUX INVOLVING TISSUE AND NONVIABLE BONE 7:30 AM 1ST CASE  PULSE LAVAGE IN ROOM : ICU      Review of Systems: All other ROS reviewed and are negative other than as stated in HPI            Social History     Tobacco Use    Smoking status: Never    Smokeless tobacco: Never   Vaping Use    Vaping Use: Never used   Substance Use Topics    Alcohol use: Not Currently     Comment: rare    Drug use: No         Past Medical History:   Diagnosis Date    ED (erectile dysfunction)     GERD (gastroesophageal reflux disease)     History of cardiovascular stress test     Normal; Saw Dr. 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 Omid Pascal    Type II or unspecified type diabetes mellitus without mention of complication, not stated as uncontrolled     Vitamin D deficiency 10/17/2008           Past Surgical History:   Procedure Laterality Date    COLONOSCOPY  08/22/2013    SLEEVE GASTRECTOMY  09/24/2018    [address] EXTRACTION Left 2014
diabetes, hyperlipidemia, hypertension, presents with chills, right great toe infection. Patient states 10 days ago he noticed a sharp pain in his right big toe. He notified his primary care physician who initially thought this could be a gout flare vs diabetic foot infection, was prescribed a medrol dose pack and 10 day course of Bactrim. Patient completed both medications, states he feel better until 3 days ago when he developed fever, nausea, chills, headaches with a loss of appetite. Patient is a known T2DM for x 20 years. Previously followed with Dr. Clemencia Marx. Denies history of open wounds in the past. Currently endorses fever, nausea, chills, sweats, shortness of breath. Denies vomiting, diarrhea, constipation, chest pain. Past Medical History:   Diagnosis Date    ED (erectile dysfunction)     GERD (gastroesophageal reflux disease)     History of cardiovascular stress test     Normal; Saw Dr. Nilo Medel    History of EKG 10/29/2014    Hyperlipidemia     Hypertension     Hypogonadism in male 04/2017    Obesity, morbid, BMI 50 or higher ([address])     EVELYN on CPAP     S/P laparoscopic sleeve gastrectomy 09/24/2018    Dr. Marivel Barney    Type II or unspecified type diabetes mellitus without mention of complication, not stated as uncontrolled     Vitamin D deficiency 10/17/2008       Past Surgical History:   Procedure Laterality Date    COLONOSCOPY  08/22/2013    SLEEVE GASTRECTOMY  09/24/2018    [address] EXTRACTION Left 2014    left lower widom tooth extraction       No current facility-administered medications on file prior to encounter.      Current Outpatient Medications on File Prior to Encounter   Medication Sig Dispense Refill    insulin aspart (NOVOLOG FLEXPEN) 100 UNIT/ML injection pen 6 units at each meals 5 Adjustable Dose Pre-filled Pen Syringe 3    Insulin Pen Needle (NOVOFINE PLUS PEN NEEDLE) 32G X 4 MM MISC qid 200 each 3    Continuous Blood Gluc Sensor (FREESTYLE CODY 2
supple and no masses  Chest : clear to auscultation bilaterally- no wheezes, rales or rhonchi, normal air movement, no respiratory distress  Heart[de-identified] Heart sounds are normal.  Regular rate and rhythm without murmur, gallop or rub. ABD:  symmetric, soft, non-tender  Musculoskeletal : no cyanosis, no clubbing, and no edema  Neuro:  Grossly normal  Skin: No rashes or nodules noted. Lymph node:  no cervical nodes  Urology: No Cantor   Psychiatric: appropriate        Data Review  Recent Labs     07/09/23  2345 07/10/23  0158 07/10/23  0603   WBC 9.6  --  15.8*   HGB 13.4* 14.4 12.4*   HCT 39.6*  --  36.3*     --  160      Recent Labs     07/09/23  2345 07/10/23  0158 07/10/23  0603     --  141   K 4.2  --  3.9     --  109*   CO2 21  --  19*   BUN 24*  --  23   CREATININE 1.02 1.0 1.13   GLUCOSE 268*  --  271*       MV Settings: ABGs: No results for input(s): PHART, HYC9XHG, PO2ART, ETD9DFC, BEART, J0KYRGWN, KBH7XMP in the last 72 hours. O2 Device: None (Room air)  O2 Flow Rate (L/min): 2 L/min  Lab Results   Component Value Date/Time    LACTA 1.0 07/10/2023 03:45 AM    LACTA 3.3 10/30/2014 02:46 PM       Radiology  I personally reviewed imaging studies films and CT chest shows no mass or infiltrate  CT right foot shows abscess in the right great toe, no osteomyelitis reported. Assessment, plan:    This is a critically ill patient at risk of deterioration / death , needing close ICU monitoring and intervention due to below noted problems       Sepsis with septic shock, shock physiology improved,  Right big toe abscess  Diabetes with hyperglycemia  Dehydration  Obesity     Recommendation  Continue Zosyn and vancomycin  Follow-up cultures  Fluid resuscitation  Levophed on standby Target MAP 60-65  O2 to keep sat 90 to 92%  Target blood sugar 140-180  Resume Lantus, insulin sliding scale  Watch renal function and urine output  ID consult  Thank you for consultation  Due to the immediate

## 2023-07-11 NOTE — FLOWSHEET NOTE
AM assessment performed. Pt resting in bed. States that he is having some surgical pain in his foot. It was noted that the original surgery dressing on pt's right foot had fallen off from pt movement. Foot was redressed and pain meds provided. Pt remains off Levophed and BP's are stable. Pt is afebrile and states that he feels a lot better from when he came to the hospital. During ICU rounds pt's case was discussed, received orders for transfer out of ICU if BP remains stable. Podiatry was in to see pt and stated will likely take pt back to OR tomorrow for closure of the surgical wound. Will continue to monitor.

## 2023-07-12 ENCOUNTER — ANESTHESIA EVENT (OUTPATIENT)
Dept: OPERATING ROOM | Age: 63
End: 2023-07-12
Payer: COMMERCIAL

## 2023-07-12 ENCOUNTER — APPOINTMENT (OUTPATIENT)
Dept: ULTRASOUND IMAGING | Age: 63
End: 2023-07-12
Payer: COMMERCIAL

## 2023-07-12 LAB
ALBUMIN SERPL-MCNC: 2.9 G/DL (ref 3.5–4.6)
ANION GAP SERPL CALCULATED.3IONS-SCNC: 12 MEQ/L (ref 9–15)
BACTERIA BLD CULT: ABNORMAL
BASOPHILS # BLD: 0 K/UL (ref 0–0.2)
BASOPHILS NFR BLD: 0.7 %
BUN SERPL-MCNC: 12 MG/DL (ref 8–23)
CALCIUM SERPL-MCNC: 8.3 MG/DL (ref 8.5–9.9)
CHLORIDE SERPL-SCNC: 112 MEQ/L (ref 95–107)
CO2 SERPL-SCNC: 20 MEQ/L (ref 20–31)
CREAT SERPL-MCNC: 0.82 MG/DL (ref 0.7–1.2)
EOSINOPHIL # BLD: 0 K/UL (ref 0–0.7)
EOSINOPHIL NFR BLD: 0 %
ERYTHROCYTE [DISTWIDTH] IN BLOOD BY AUTOMATED COUNT: 13.8 % (ref 11.5–14.5)
GLUCOSE BLD-MCNC: 216 MG/DL (ref 70–99)
GLUCOSE BLD-MCNC: 244 MG/DL (ref 70–99)
GLUCOSE SERPL-MCNC: 124 MG/DL (ref 70–99)
HCT VFR BLD AUTO: 31 % (ref 42–52)
HGB BLD-MCNC: 10.5 G/DL (ref 14–18)
LYMPHOCYTES # BLD: 1.2 K/UL (ref 1–4.8)
LYMPHOCYTES NFR BLD: 28 %
MCH RBC QN AUTO: 30 PG (ref 27–31.3)
MCHC RBC AUTO-ENTMCNC: 33.9 % (ref 33–37)
MCV RBC AUTO: 88.7 FL (ref 79–92.2)
MONOCYTES # BLD: 0.4 K/UL (ref 0.2–0.8)
MONOCYTES NFR BLD: 10.6 %
NEUTROPHILS # BLD: 2.6 K/UL (ref 1.4–6.5)
NEUTS SEG NFR BLD: 60.7 %
PERFORMED ON: ABNORMAL
PERFORMED ON: ABNORMAL
PHOSPHATE SERPL-MCNC: 2.9 MG/DL (ref 2.3–4.8)
PLATELET # BLD AUTO: 154 K/UL (ref 130–400)
POTASSIUM SERPL-SCNC: 4 MEQ/L (ref 3.4–4.9)
RBC # BLD AUTO: 3.5 M/UL (ref 4.7–6.1)
SODIUM SERPL-SCNC: 144 MEQ/L (ref 135–144)
WBC # BLD AUTO: 4.2 K/UL (ref 4.8–10.8)

## 2023-07-12 PROCEDURE — 2580000003 HC RX 258: Performed by: INTERNAL MEDICINE

## 2023-07-12 PROCEDURE — 97161 PT EVAL LOW COMPLEX 20 MIN: CPT

## 2023-07-12 PROCEDURE — 6370000000 HC RX 637 (ALT 250 FOR IP): Performed by: INTERNAL MEDICINE

## 2023-07-12 PROCEDURE — 99232 SBSQ HOSP IP/OBS MODERATE 35: CPT | Performed by: INTERNAL MEDICINE

## 2023-07-12 PROCEDURE — 2580000003 HC RX 258

## 2023-07-12 PROCEDURE — 36415 COLL VENOUS BLD VENIPUNCTURE: CPT

## 2023-07-12 PROCEDURE — 2580000003 HC RX 258: Performed by: ANESTHESIOLOGY

## 2023-07-12 PROCEDURE — 80069 RENAL FUNCTION PANEL: CPT

## 2023-07-12 PROCEDURE — 85025 COMPLETE CBC W/AUTO DIFF WBC: CPT

## 2023-07-12 PROCEDURE — 1210000000 HC MED SURG R&B

## 2023-07-12 PROCEDURE — 6360000002 HC RX W HCPCS

## 2023-07-12 PROCEDURE — 6370000000 HC RX 637 (ALT 250 FOR IP)

## 2023-07-12 PROCEDURE — 87076 CULTURE ANAEROBE IDENT EACH: CPT

## 2023-07-12 PROCEDURE — 97165 OT EVAL LOW COMPLEX 30 MIN: CPT | Performed by: OCCUPATIONAL THERAPIST

## 2023-07-12 PROCEDURE — 93925 LOWER EXTREMITY STUDY: CPT

## 2023-07-12 RX ORDER — GLUCAGON 1 MG/ML
1 KIT INJECTION PRN
Status: DISCONTINUED | OUTPATIENT
Start: 2023-07-12 | End: 2023-07-14 | Stop reason: HOSPADM

## 2023-07-12 RX ADMIN — INSULIN GLARGINE 20 UNITS: 100 INJECTION, SOLUTION SUBCUTANEOUS at 22:31

## 2023-07-12 RX ADMIN — INSULIN LISPRO 2 UNITS: 100 INJECTION, SOLUTION INTRAVENOUS; SUBCUTANEOUS at 14:00

## 2023-07-12 RX ADMIN — SODIUM CHLORIDE, POTASSIUM CHLORIDE, SODIUM LACTATE AND CALCIUM CHLORIDE: 600; 310; 30; 20 INJECTION, SOLUTION INTRAVENOUS at 13:26

## 2023-07-12 RX ADMIN — SODIUM CHLORIDE, PRESERVATIVE FREE 10 ML: 5 INJECTION INTRAVENOUS at 13:09

## 2023-07-12 RX ADMIN — ENOXAPARIN SODIUM 30 MG: 100 INJECTION SUBCUTANEOUS at 22:30

## 2023-07-12 RX ADMIN — SODIUM CHLORIDE, PRESERVATIVE FREE 10 ML: 5 INJECTION INTRAVENOUS at 22:39

## 2023-07-12 RX ADMIN — PIPERACILLIN AND TAZOBACTAM 3375 MG: 3; .375 INJECTION, POWDER, LYOPHILIZED, FOR SOLUTION INTRAVENOUS at 13:26

## 2023-07-12 RX ADMIN — PIPERACILLIN AND TAZOBACTAM 3375 MG: 3; .375 INJECTION, POWDER, LYOPHILIZED, FOR SOLUTION INTRAVENOUS at 03:16

## 2023-07-12 RX ADMIN — VANCOMYCIN HYDROCHLORIDE 1250 MG: 1.25 INJECTION, POWDER, LYOPHILIZED, FOR SOLUTION INTRAVENOUS at 18:28

## 2023-07-12 RX ADMIN — SODIUM CHLORIDE, POTASSIUM CHLORIDE, SODIUM LACTATE AND CALCIUM CHLORIDE: 600; 310; 30; 20 INJECTION, SOLUTION INTRAVENOUS at 03:59

## 2023-07-12 RX ADMIN — VANCOMYCIN HYDROCHLORIDE 1250 MG: 1.25 INJECTION, POWDER, LYOPHILIZED, FOR SOLUTION INTRAVENOUS at 04:05

## 2023-07-12 RX ADMIN — PIPERACILLIN AND TAZOBACTAM 3375 MG: 3; .375 INJECTION, POWDER, LYOPHILIZED, FOR SOLUTION INTRAVENOUS at 22:39

## 2023-07-12 ASSESSMENT — ENCOUNTER SYMPTOMS
SHORTNESS OF BREATH: 0
COUGH: 0
DIARRHEA: 0

## 2023-07-12 ASSESSMENT — PAIN SCALES - GENERAL
PAINLEVEL_OUTOF10: 0
PAINLEVEL_OUTOF10: 0

## 2023-07-12 NOTE — PLAN OF CARE
Problem: Discharge Planning  Goal: Discharge to home or other facility with appropriate resources  Outcome: Progressing  Flowsheets (Taken 7/11/2023 2000)  Discharge to home or other facility with appropriate resources: Identify barriers to discharge with patient and caregiver     Problem: Pain  Goal: Verbalizes/displays adequate comfort level or baseline comfort level  Outcome: Progressing  Flowsheets (Taken 7/11/2023 2000)  Verbalizes/displays adequate comfort level or baseline comfort level: Encourage patient to monitor pain and request assistance     Problem: Safety - Adult  Goal: Free from fall injury  Outcome: Progressing     Problem: Chronic Conditions and Co-morbidities  Goal: Patient's chronic conditions and co-morbidity symptoms are monitored and maintained or improved  Outcome: Progressing  Flowsheets (Taken 7/11/2023 2000)  Care Plan - Patient's Chronic Conditions and Co-Morbidity Symptoms are Monitored and Maintained or Improved: Monitor and assess patient's chronic conditions and comorbid symptoms for stability, deterioration, or improvement     Problem: Skin/Tissue Integrity  Goal: Absence of new skin breakdown  Description: 1. Monitor for areas of redness and/or skin breakdown  2. Assess vascular access sites hourly  3. Every 4-6 hours minimum:  Change oxygen saturation probe site  4. Every 4-6 hours:  If on nasal continuous positive airway pressure, respiratory therapy assess nares and determine need for appliance change or resting period.   Outcome: Progressing     Problem: ABCDS Injury Assessment  Goal: Absence of physical injury  Outcome: Progressing

## 2023-07-12 NOTE — CARE COORDINATION
Quality round completed with care management team. Pt lives at home with wife. Independent; No Home O2; No dialysis; Discuss DC Plan with pt. Pt would like to go home with wife; denies any needs. TALON will follow.      Electronically signed by TALON Menjivar on 7/12/2023 at 9:43 AM

## 2023-07-12 NOTE — CARE COORDINATION
Met with patient to discuss poss dc plan. I let him know he has 75%cov for IV's at home with [address] if needed. His cost with current meds would be 76./day but he will likely not need 2 IV antibiotics for d/c. He is aware we are awaiting cultures and ID plan. He would prefer to go to outpatient infusion center if that is possible. IF HHC is needed for frequency he would be agreeable to [facility] . [place] of choice offered and he chose [facility]. He states he lives in [facility]. If IV's are not needed he denies any dc needs. He has shoe in room for his surgical foot to walk on. He states he is going back tomorrow for further inspection/surg on foot.

## 2023-07-13 ENCOUNTER — ANESTHESIA (OUTPATIENT)
Dept: OPERATING ROOM | Age: 63
End: 2023-07-13
Payer: COMMERCIAL

## 2023-07-13 LAB
ALBUMIN SERPL-MCNC: 3 G/DL (ref 3.5–4.6)
ANION GAP SERPL CALCULATED.3IONS-SCNC: 12 MEQ/L (ref 9–15)
BASOPHILS # BLD: 0 K/UL (ref 0–0.2)
BASOPHILS NFR BLD: 0.7 %
BUN SERPL-MCNC: 12 MG/DL (ref 8–23)
CALCIUM SERPL-MCNC: 8.8 MG/DL (ref 8.5–9.9)
CHLORIDE SERPL-SCNC: 112 MEQ/L (ref 95–107)
CHP ED QC CHECK: NORMAL
CO2 SERPL-SCNC: 22 MEQ/L (ref 20–31)
CREAT SERPL-MCNC: 0.83 MG/DL (ref 0.7–1.2)
CULTURE SURGICAL: ABNORMAL
CULTURE SURGICAL: ABNORMAL
EOSINOPHIL # BLD: 0 K/UL (ref 0–0.7)
EOSINOPHIL NFR BLD: 0 %
ERYTHROCYTE [DISTWIDTH] IN BLOOD BY AUTOMATED COUNT: 13.2 % (ref 11.5–14.5)
GLUCOSE BLD-MCNC: 123 MG/DL (ref 70–99)
GLUCOSE BLD-MCNC: 146 MG/DL
GLUCOSE BLD-MCNC: 146 MG/DL (ref 70–99)
GLUCOSE BLD-MCNC: 149 MG/DL (ref 70–99)
GLUCOSE BLD-MCNC: 168 MG/DL (ref 70–99)
GLUCOSE BLD-MCNC: 189 MG/DL (ref 70–99)
GLUCOSE SERPL-MCNC: 174 MG/DL (ref 70–99)
HCT VFR BLD AUTO: 30.9 % (ref 42–52)
HGB BLD-MCNC: 10.4 G/DL (ref 14–18)
LYMPHOCYTES # BLD: 1.1 K/UL (ref 1–4.8)
LYMPHOCYTES NFR BLD: 26.8 %
MCH RBC QN AUTO: 29.9 PG (ref 27–31.3)
MCHC RBC AUTO-ENTMCNC: 33.8 % (ref 33–37)
MCV RBC AUTO: 88.4 FL (ref 79–92.2)
MONOCYTES # BLD: 0.4 K/UL (ref 0.2–0.8)
MONOCYTES NFR BLD: 10.3 %
NEUTROPHILS # BLD: 2.5 K/UL (ref 1.4–6.5)
NEUTS SEG NFR BLD: 62.2 %
ORGANISM: ABNORMAL
PERFORMED ON: ABNORMAL
PHOSPHATE SERPL-MCNC: 3.6 MG/DL (ref 2.3–4.8)
PLATELET # BLD AUTO: 187 K/UL (ref 130–400)
POTASSIUM SERPL-SCNC: 4.3 MEQ/L (ref 3.4–4.9)
RBC # BLD AUTO: 3.5 M/UL (ref 4.7–6.1)
SODIUM SERPL-SCNC: 146 MEQ/L (ref 135–144)
VANCOMYCIN SERPL-MCNC: 15 UG/ML (ref 10–40)
VANCOMYCIN SERPL-MCNC: 28.7 UG/ML (ref 10–40)
WBC # BLD AUTO: 4.1 K/UL (ref 4.8–10.8)

## 2023-07-13 PROCEDURE — 3700000001 HC ADD 15 MINUTES (ANESTHESIA): Performed by: STUDENT IN AN ORGANIZED HEALTH CARE EDUCATION/TRAINING PROGRAM

## 2023-07-13 PROCEDURE — 80069 RENAL FUNCTION PANEL: CPT

## 2023-07-13 PROCEDURE — 0JBQ0ZZ EXCISION OF RIGHT FOOT SUBCUTANEOUS TISSUE AND FASCIA, OPEN APPROACH: ICD-10-PCS

## 2023-07-13 PROCEDURE — 3600000004 HC SURGERY LEVEL 4 BASE: Performed by: STUDENT IN AN ORGANIZED HEALTH CARE EDUCATION/TRAINING PROGRAM

## 2023-07-13 PROCEDURE — 6360000002 HC RX W HCPCS

## 2023-07-13 PROCEDURE — 80202 ASSAY OF VANCOMYCIN: CPT

## 2023-07-13 PROCEDURE — 2580000003 HC RX 258: Performed by: STUDENT IN AN ORGANIZED HEALTH CARE EDUCATION/TRAINING PROGRAM

## 2023-07-13 PROCEDURE — 2709999900 HC NON-CHARGEABLE SUPPLY: Performed by: STUDENT IN AN ORGANIZED HEALTH CARE EDUCATION/TRAINING PROGRAM

## 2023-07-13 PROCEDURE — 6360000002 HC RX W HCPCS: Performed by: STUDENT IN AN ORGANIZED HEALTH CARE EDUCATION/TRAINING PROGRAM

## 2023-07-13 PROCEDURE — 99232 SBSQ HOSP IP/OBS MODERATE 35: CPT | Performed by: INTERNAL MEDICINE

## 2023-07-13 PROCEDURE — 1210000000 HC MED SURG R&B

## 2023-07-13 PROCEDURE — 2580000003 HC RX 258

## 2023-07-13 PROCEDURE — 2500000003 HC RX 250 WO HCPCS: Performed by: NURSE ANESTHETIST, CERTIFIED REGISTERED

## 2023-07-13 PROCEDURE — 3700000000 HC ANESTHESIA ATTENDED CARE: Performed by: STUDENT IN AN ORGANIZED HEALTH CARE EDUCATION/TRAINING PROGRAM

## 2023-07-13 PROCEDURE — 2580000003 HC RX 258: Performed by: NURSE ANESTHETIST, CERTIFIED REGISTERED

## 2023-07-13 PROCEDURE — 6360000002 HC RX W HCPCS: Performed by: NURSE ANESTHETIST, CERTIFIED REGISTERED

## 2023-07-13 PROCEDURE — 2500000003 HC RX 250 WO HCPCS: Performed by: STUDENT IN AN ORGANIZED HEALTH CARE EDUCATION/TRAINING PROGRAM

## 2023-07-13 PROCEDURE — 6370000000 HC RX 637 (ALT 250 FOR IP): Performed by: INTERNAL MEDICINE

## 2023-07-13 PROCEDURE — 36415 COLL VENOUS BLD VENIPUNCTURE: CPT

## 2023-07-13 PROCEDURE — A4217 STERILE WATER/SALINE, 500 ML: HCPCS | Performed by: STUDENT IN AN ORGANIZED HEALTH CARE EDUCATION/TRAINING PROGRAM

## 2023-07-13 PROCEDURE — 2580000003 HC RX 258: Performed by: INTERNAL MEDICINE

## 2023-07-13 PROCEDURE — 7100000001 HC PACU RECOVERY - ADDTL 15 MIN: Performed by: STUDENT IN AN ORGANIZED HEALTH CARE EDUCATION/TRAINING PROGRAM

## 2023-07-13 PROCEDURE — 3600000014 HC SURGERY LEVEL 4 ADDTL 15MIN: Performed by: STUDENT IN AN ORGANIZED HEALTH CARE EDUCATION/TRAINING PROGRAM

## 2023-07-13 PROCEDURE — 7100000000 HC PACU RECOVERY - FIRST 15 MIN: Performed by: STUDENT IN AN ORGANIZED HEALTH CARE EDUCATION/TRAINING PROGRAM

## 2023-07-13 PROCEDURE — 99231 SBSQ HOSP IP/OBS SF/LOW 25: CPT | Performed by: INTERNAL MEDICINE

## 2023-07-13 PROCEDURE — 85025 COMPLETE CBC W/AUTO DIFF WBC: CPT

## 2023-07-13 RX ORDER — SODIUM CHLORIDE, SODIUM LACTATE, POTASSIUM CHLORIDE, CALCIUM CHLORIDE 600; 310; 30; 20 MG/100ML; MG/100ML; MG/100ML; MG/100ML
INJECTION, SOLUTION INTRAVENOUS CONTINUOUS PRN
Status: DISCONTINUED | OUTPATIENT
Start: 2023-07-13 | End: 2023-07-13 | Stop reason: SDUPTHER

## 2023-07-13 RX ORDER — MAGNESIUM HYDROXIDE 1200 MG/15ML
LIQUID ORAL CONTINUOUS PRN
Status: COMPLETED | OUTPATIENT
Start: 2023-07-13 | End: 2023-07-13

## 2023-07-13 RX ORDER — ONDANSETRON 2 MG/ML
4 INJECTION INTRAMUSCULAR; INTRAVENOUS
Status: DISCONTINUED | OUTPATIENT
Start: 2023-07-13 | End: 2023-07-13 | Stop reason: HOSPADM

## 2023-07-13 RX ORDER — SODIUM CHLORIDE 0.9 % (FLUSH) 0.9 %
5-40 SYRINGE (ML) INJECTION PRN
Status: CANCELLED | OUTPATIENT
Start: 2023-07-13

## 2023-07-13 RX ORDER — MEPERIDINE HYDROCHLORIDE 25 MG/ML
12.5 INJECTION INTRAMUSCULAR; INTRAVENOUS; SUBCUTANEOUS
Status: DISCONTINUED | OUTPATIENT
Start: 2023-07-13 | End: 2023-07-13 | Stop reason: HOSPADM

## 2023-07-13 RX ORDER — SODIUM CHLORIDE 450 MG/100ML
INJECTION, SOLUTION INTRAVENOUS CONTINUOUS
Status: DISCONTINUED | OUTPATIENT
Start: 2023-07-13 | End: 2023-07-14

## 2023-07-13 RX ORDER — OXYCODONE HYDROCHLORIDE 5 MG/1
5 TABLET ORAL
Status: DISCONTINUED | OUTPATIENT
Start: 2023-07-13 | End: 2023-07-13 | Stop reason: HOSPADM

## 2023-07-13 RX ORDER — HYDROMORPHONE HYDROCHLORIDE 1 MG/ML
0.5 INJECTION, SOLUTION INTRAMUSCULAR; INTRAVENOUS; SUBCUTANEOUS EVERY 10 MIN PRN
Status: DISCONTINUED | OUTPATIENT
Start: 2023-07-13 | End: 2023-07-13 | Stop reason: HOSPADM

## 2023-07-13 RX ORDER — FENTANYL CITRATE 0.05 MG/ML
50 INJECTION, SOLUTION INTRAMUSCULAR; INTRAVENOUS EVERY 10 MIN PRN
Status: DISCONTINUED | OUTPATIENT
Start: 2023-07-13 | End: 2023-07-13 | Stop reason: HOSPADM

## 2023-07-13 RX ORDER — BUPIVACAINE HYDROCHLORIDE 5 MG/ML
INJECTION, SOLUTION EPIDURAL; INTRACAUDAL PRN
Status: DISCONTINUED | OUTPATIENT
Start: 2023-07-13 | End: 2023-07-13 | Stop reason: ALTCHOICE

## 2023-07-13 RX ORDER — SODIUM CHLORIDE 9 MG/ML
INJECTION, SOLUTION INTRAVENOUS PRN
Status: CANCELLED | OUTPATIENT
Start: 2023-07-13

## 2023-07-13 RX ORDER — LIDOCAINE HYDROCHLORIDE 10 MG/ML
INJECTION, SOLUTION EPIDURAL; INFILTRATION; INTRACAUDAL; PERINEURAL PRN
Status: DISCONTINUED | OUTPATIENT
Start: 2023-07-13 | End: 2023-07-13 | Stop reason: SDUPTHER

## 2023-07-13 RX ORDER — PROPOFOL 10 MG/ML
INJECTION, EMULSION INTRAVENOUS CONTINUOUS PRN
Status: DISCONTINUED | OUTPATIENT
Start: 2023-07-13 | End: 2023-07-13 | Stop reason: SDUPTHER

## 2023-07-13 RX ORDER — DIPHENHYDRAMINE HYDROCHLORIDE 50 MG/ML
12.5 INJECTION INTRAMUSCULAR; INTRAVENOUS
Status: DISCONTINUED | OUTPATIENT
Start: 2023-07-13 | End: 2023-07-13 | Stop reason: HOSPADM

## 2023-07-13 RX ORDER — SODIUM CHLORIDE 9 MG/ML
INJECTION, SOLUTION INTRAVENOUS PRN
Status: DISCONTINUED | OUTPATIENT
Start: 2023-07-13 | End: 2023-07-13 | Stop reason: HOSPADM

## 2023-07-13 RX ORDER — OXYCODONE HYDROCHLORIDE AND ACETAMINOPHEN 5; 325 MG/1; MG/1
1 TABLET ORAL EVERY 4 HOURS PRN
Status: DISCONTINUED | OUTPATIENT
Start: 2023-07-13 | End: 2023-07-14 | Stop reason: HOSPADM

## 2023-07-13 RX ORDER — LIDOCAINE HYDROCHLORIDE 10 MG/ML
1 INJECTION, SOLUTION EPIDURAL; INFILTRATION; INTRACAUDAL; PERINEURAL
Status: CANCELLED | OUTPATIENT
Start: 2023-07-13 | End: 2023-07-14

## 2023-07-13 RX ORDER — SODIUM CHLORIDE 0.9 % (FLUSH) 0.9 %
5-40 SYRINGE (ML) INJECTION EVERY 12 HOURS SCHEDULED
Status: DISCONTINUED | OUTPATIENT
Start: 2023-07-13 | End: 2023-07-13 | Stop reason: HOSPADM

## 2023-07-13 RX ORDER — SODIUM CHLORIDE 0.9 % (FLUSH) 0.9 %
5-40 SYRINGE (ML) INJECTION EVERY 12 HOURS SCHEDULED
Status: CANCELLED | OUTPATIENT
Start: 2023-07-13

## 2023-07-13 RX ORDER — MIDAZOLAM HYDROCHLORIDE 1 MG/ML
INJECTION INTRAMUSCULAR; INTRAVENOUS PRN
Status: DISCONTINUED | OUTPATIENT
Start: 2023-07-13 | End: 2023-07-13 | Stop reason: SDUPTHER

## 2023-07-13 RX ORDER — SODIUM CHLORIDE 0.9 % (FLUSH) 0.9 %
5-40 SYRINGE (ML) INJECTION PRN
Status: DISCONTINUED | OUTPATIENT
Start: 2023-07-13 | End: 2023-07-13 | Stop reason: HOSPADM

## 2023-07-13 RX ORDER — METOCLOPRAMIDE HYDROCHLORIDE 5 MG/ML
10 INJECTION INTRAMUSCULAR; INTRAVENOUS
Status: DISCONTINUED | OUTPATIENT
Start: 2023-07-13 | End: 2023-07-13 | Stop reason: HOSPADM

## 2023-07-13 RX ORDER — LIDOCAINE HYDROCHLORIDE 20 MG/ML
INJECTION, SOLUTION EPIDURAL; INFILTRATION; INTRACAUDAL; PERINEURAL PRN
Status: DISCONTINUED | OUTPATIENT
Start: 2023-07-13 | End: 2023-07-13 | Stop reason: ALTCHOICE

## 2023-07-13 RX ADMIN — ENOXAPARIN SODIUM 30 MG: 100 INJECTION SUBCUTANEOUS at 20:19

## 2023-07-13 RX ADMIN — OXYCODONE AND ACETAMINOPHEN 1 TABLET: 325; 5 TABLET ORAL at 18:09

## 2023-07-13 RX ADMIN — SODIUM CHLORIDE, POTASSIUM CHLORIDE, SODIUM LACTATE AND CALCIUM CHLORIDE: 600; 310; 30; 20 INJECTION, SOLUTION INTRAVENOUS at 12:08

## 2023-07-13 RX ADMIN — VANCOMYCIN HYDROCHLORIDE 1250 MG: 1.25 INJECTION, POWDER, LYOPHILIZED, FOR SOLUTION INTRAVENOUS at 20:22

## 2023-07-13 RX ADMIN — PIPERACILLIN AND TAZOBACTAM 3375 MG: 3; .375 INJECTION, POWDER, LYOPHILIZED, FOR SOLUTION INTRAVENOUS at 15:05

## 2023-07-13 RX ADMIN — PIPERACILLIN AND TAZOBACTAM 3375 MG: 3; .375 INJECTION, POWDER, LYOPHILIZED, FOR SOLUTION INTRAVENOUS at 23:33

## 2023-07-13 RX ADMIN — SODIUM CHLORIDE: 4.5 INJECTION, SOLUTION INTRAVENOUS at 15:03

## 2023-07-13 RX ADMIN — OXYCODONE AND ACETAMINOPHEN 1 TABLET: 325; 5 TABLET ORAL at 23:28

## 2023-07-13 RX ADMIN — MIDAZOLAM HYDROCHLORIDE 2 MG: 1 INJECTION, SOLUTION INTRAMUSCULAR; INTRAVENOUS at 12:09

## 2023-07-13 RX ADMIN — PROPOFOL 50 MCG/KG/MIN: 10 INJECTION, EMULSION INTRAVENOUS at 12:22

## 2023-07-13 RX ADMIN — INSULIN GLARGINE 20 UNITS: 100 INJECTION, SOLUTION SUBCUTANEOUS at 20:20

## 2023-07-13 RX ADMIN — VANCOMYCIN HYDROCHLORIDE 1250 MG: 1.25 INJECTION, POWDER, LYOPHILIZED, FOR SOLUTION INTRAVENOUS at 04:45

## 2023-07-13 RX ADMIN — PIPERACILLIN AND TAZOBACTAM 3375 MG: 3; .375 INJECTION, POWDER, LYOPHILIZED, FOR SOLUTION INTRAVENOUS at 06:59

## 2023-07-13 RX ADMIN — LIDOCAINE HYDROCHLORIDE 40 MG: 10 INJECTION, SOLUTION EPIDURAL; INFILTRATION; INTRACAUDAL; PERINEURAL at 12:22

## 2023-07-13 ASSESSMENT — PAIN SCALES - GENERAL
PAINLEVEL_OUTOF10: 0
PAINLEVEL_OUTOF10: 7
PAINLEVEL_OUTOF10: 10
PAINLEVEL_OUTOF10: 0

## 2023-07-13 ASSESSMENT — PAIN DESCRIPTION - ORIENTATION: ORIENTATION: RIGHT

## 2023-07-13 ASSESSMENT — PAIN DESCRIPTION - LOCATION
LOCATION: FOOT
LOCATION: FOOT

## 2023-07-13 ASSESSMENT — ENCOUNTER SYMPTOMS
DIARRHEA: 0
COUGH: 0
SHORTNESS OF BREATH: 0

## 2023-07-13 NOTE — BRIEF OP NOTE
Brief Postoperative Note      Patient: Paola Chand  YOB: 1960  MRN: [id number]    Date of Procedure: 7/13/2023    Pre-Op Diagnosis Codes:     * Open wound of toe, initial encounter [S91.109A]     * Gas gangrene of foot ([address]) [A48.0]    Post-Op Diagnosis:   Diabetic foot infection right hallux       Procedure(s):  DELAYED PRIMARY CLOSURE OF RIGHT BIG    Surgeon(s):  Selvin Cadet DPM    Assistant:  Syd Powell DPM PGY2    Anesthesia: Monitor Anesthesia Care    Estimated Blood Loss (mL): <4LW    Complications: None    Specimens:   * No specimens in log *    Implants:  * No implants in log *      Drains: * No LDAs found *    Findings: healthy well adhered fat pad    Electronically signed by Syd Powell DPM on 7/13/2023 at 12:54 PM

## 2023-07-13 NOTE — ANESTHESIA POSTPROCEDURE EVALUATION
Department of Anesthesiology  Postprocedure Note    Patient: Kesha Godinez  MRN: [id number]  YOB: 1960  Date of evaluation: 7/13/2023      Procedure Summary     Date: 07/13/23 Room / Location: [address]    Anesthesia Start: 8010 Anesthesia Stop:     Procedure: DELAYED PRIMARY CLOSURE OF RIGHT BIG (Right: Toes) Diagnosis:       Open wound of toe, initial encounter      Gas gangrene of foot ([address])      (Open wound of toe, initial encounter [S91.109A])      (Gas gangrene of foot ([address]) [A48.0])    Surgeons: Markus Mohan DPM Responsible Provider: Fely Baker DO    Anesthesia Type: MAC ASA Status: 3          Anesthesia Type: MAC    Jean Phase I:      Jean Phase II:        Anesthesia Post Evaluation    Patient location during evaluation: bedside  Patient participation: complete - patient participated  Level of consciousness: awake and awake and alert  Pain score: 3  Airway patency: patent  Nausea & Vomiting: no nausea and no vomiting  Complications: no  Cardiovascular status: blood pressure returned to baseline and hemodynamically stable  Respiratory status: acceptable  Hydration status: euvolemic

## 2023-07-13 NOTE — PLAN OF CARE
[facility] MEDICINE AND SURGERY   PLAN OF CARE    NAME: Byron Boss  MRN: [id number]  DATE: July 13, 2023  TIME: 2:17 PM    PLAN AND RECOMMENDATIONS:      Patient is POD #0 s/p delayed primary closure right hallux. Operative site was closed. Please do not change the right foot dressings. Podiatry will change the first postop dressing. If strike through noted, nursing can reinforce dressing with ABD, Kerlix, and ACE over top of existing dressing. NWB RLE in a post op shoe. Ok to use walker, crutches, or other assistive device(s) to use the restroom or for Physical Therapy. Elevate RLE at or above the level of the heart. Prevalon boots on when in bed. Patient is cleared for discharge from podiatry standpoint.     Mildred Swenson DPM PGY-2  July 13, 2023  2:17 PM

## 2023-07-13 NOTE — CARE COORDINATION
MET W/PT TO ASSESS NEEDS AND DISCUSS DISCHARGE PLAN. PT IS AGREEABLE TO HHC IF IV ABX NEEDED. [address] OFFERED AND PT WOULD LIKE  [address]. REFERRAL CALLED TO [facility]. THEY WOULD NEED TO KNOW BY TOMORROW IF HHC IS NEEDED. PT MAY BENEFIT FROM ASSISTIVE DEVICE UPON DISCHARGE. IV BENEFITS CHECK SENT ON 7/11, CURRENTLY ON ZOSYN AND VANCOMYCIN. CM TO FOLLOW FOR HHC NEEDS. [address] IS ABLE TO ACCEPT PT. PAGED PHYSICAL THERAPY TO EVALUATE FOR PROPER ASSISTIVE DEVICE/PT PREFERENCE. WILL FOLLOW. MET W/PT TO INFORM OF IV BENEFITS CHECK OF $76/day. [facility] TO INFORM OF PT AND REQUEST SERVICE AGREEMENT TO BE FAXED. SPOKE W/ PAULA. AWAITING FINAL ID PLAN TO FAX OVER. WILL NEED PICC LINE PRIOR TO DISCHARGE IF GOING HOME ON IV ABX. SECOND PAGE TO PT TO EVALUATE FOR PROPER ASSISTIVE DEVICE. CM TO FOLLOW.

## 2023-07-14 VITALS
RESPIRATION RATE: 16 BRPM | TEMPERATURE: 98.1 F | HEART RATE: 70 BPM | OXYGEN SATURATION: 96 % | SYSTOLIC BLOOD PRESSURE: 139 MMHG | WEIGHT: 268.3 LBS | HEIGHT: 70 IN | BODY MASS INDEX: 38.41 KG/M2 | DIASTOLIC BLOOD PRESSURE: 73 MMHG

## 2023-07-14 LAB
ALBUMIN SERPL-MCNC: 2.9 G/DL (ref 3.5–4.6)
ANION GAP SERPL CALCULATED.3IONS-SCNC: 7 MEQ/L (ref 9–15)
BASOPHILS # BLD: 0 K/UL (ref 0–0.2)
BASOPHILS NFR BLD: 0.5 %
BUN SERPL-MCNC: 9 MG/DL (ref 8–23)
CALCIUM SERPL-MCNC: 9.1 MG/DL (ref 8.5–9.9)
CHLORIDE SERPL-SCNC: 106 MEQ/L (ref 95–107)
CO2 SERPL-SCNC: 26 MEQ/L (ref 20–31)
CREAT SERPL-MCNC: 0.84 MG/DL (ref 0.7–1.2)
CULTURE, BLOOD ID SENSITIVITY: ABNORMAL
EOSINOPHIL # BLD: 0 K/UL (ref 0–0.7)
EOSINOPHIL NFR BLD: 0 %
ERYTHROCYTE [DISTWIDTH] IN BLOOD BY AUTOMATED COUNT: 13 % (ref 11.5–14.5)
GLUCOSE BLD-MCNC: 105 MG/DL (ref 70–99)
GLUCOSE BLD-MCNC: 147 MG/DL (ref 70–99)
GLUCOSE SERPL-MCNC: 108 MG/DL (ref 70–99)
HCT VFR BLD AUTO: 30.1 % (ref 42–52)
HGB BLD-MCNC: 10.2 G/DL (ref 14–18)
LYMPHOCYTES # BLD: 1.2 K/UL (ref 1–4.8)
LYMPHOCYTES NFR BLD: 27.5 %
MCH RBC QN AUTO: 29.6 PG (ref 27–31.3)
MCHC RBC AUTO-ENTMCNC: 33.8 % (ref 33–37)
MCV RBC AUTO: 87.5 FL (ref 79–92.2)
MONOCYTES # BLD: 0.5 K/UL (ref 0.2–0.8)
MONOCYTES NFR BLD: 11.2 %
NEUTROPHILS # BLD: 2.7 K/UL (ref 1.4–6.5)
NEUTS SEG NFR BLD: 60.8 %
ORGANISM: ABNORMAL
PERFORMED ON: ABNORMAL
PERFORMED ON: ABNORMAL
PHOSPHATE SERPL-MCNC: 3.6 MG/DL (ref 2.3–4.8)
PLATELET # BLD AUTO: 204 K/UL (ref 130–400)
POTASSIUM SERPL-SCNC: 4 MEQ/L (ref 3.4–4.9)
RBC # BLD AUTO: 3.44 M/UL (ref 4.7–6.1)
SODIUM SERPL-SCNC: 139 MEQ/L (ref 135–144)
WBC # BLD AUTO: 4.5 K/UL (ref 4.8–10.8)

## 2023-07-14 PROCEDURE — 99232 SBSQ HOSP IP/OBS MODERATE 35: CPT | Performed by: INTERNAL MEDICINE

## 2023-07-14 PROCEDURE — 2700000000 HC OXYGEN THERAPY PER DAY

## 2023-07-14 PROCEDURE — 6370000000 HC RX 637 (ALT 250 FOR IP): Performed by: INTERNAL MEDICINE

## 2023-07-14 PROCEDURE — 97535 SELF CARE MNGMENT TRAINING: CPT

## 2023-07-14 PROCEDURE — 36415 COLL VENOUS BLD VENIPUNCTURE: CPT

## 2023-07-14 PROCEDURE — 6360000002 HC RX W HCPCS

## 2023-07-14 PROCEDURE — 85025 COMPLETE CBC W/AUTO DIFF WBC: CPT

## 2023-07-14 PROCEDURE — 2580000003 HC RX 258

## 2023-07-14 PROCEDURE — 80069 RENAL FUNCTION PANEL: CPT

## 2023-07-14 PROCEDURE — 97116 GAIT TRAINING THERAPY: CPT

## 2023-07-14 RX ORDER — OXYCODONE HYDROCHLORIDE AND ACETAMINOPHEN 5; 325 MG/1; MG/1
1 TABLET ORAL EVERY 4 HOURS PRN
Qty: 20 TABLET | Refills: 0 | Status: SHIPPED | OUTPATIENT
Start: 2023-07-14 | End: 2023-07-21

## 2023-07-14 RX ORDER — AMOXICILLIN AND CLAVULANATE POTASSIUM 500; 125 MG/1; MG/1
1 TABLET, FILM COATED ORAL EVERY 8 HOURS SCHEDULED
Qty: 42 TABLET | Refills: 0 | Status: SHIPPED | OUTPATIENT
Start: 2023-07-14 | End: 2023-07-28

## 2023-07-14 RX ADMIN — ENOXAPARIN SODIUM 30 MG: 100 INJECTION SUBCUTANEOUS at 08:23

## 2023-07-14 RX ADMIN — OXYCODONE AND ACETAMINOPHEN 1 TABLET: 325; 5 TABLET ORAL at 12:03

## 2023-07-14 RX ADMIN — OXYCODONE AND ACETAMINOPHEN 1 TABLET: 325; 5 TABLET ORAL at 03:54

## 2023-07-14 RX ADMIN — PIPERACILLIN AND TAZOBACTAM 3375 MG: 3; .375 INJECTION, POWDER, LYOPHILIZED, FOR SOLUTION INTRAVENOUS at 08:23

## 2023-07-14 ASSESSMENT — PAIN DESCRIPTION - LOCATION
LOCATION: FOOT
LOCATION: FOOT

## 2023-07-14 ASSESSMENT — PAIN SCALES - GENERAL
PAINLEVEL_OUTOF10: 6
PAINLEVEL_OUTOF10: 8
PAINLEVEL_OUTOF10: 0

## 2023-07-14 ASSESSMENT — PAIN DESCRIPTION - DESCRIPTORS: DESCRIPTORS: THROBBING

## 2023-07-14 ASSESSMENT — PAIN DESCRIPTION - ORIENTATION
ORIENTATION: RIGHT
ORIENTATION: RIGHT

## 2023-07-14 NOTE — CARE COORDINATION
MET WITH PT AT BEDSIDE. NO IV ABX FOR DISCHARGE. PT DECLINED HHC NEEDS. DRESSING TO BE CHANGED BY PODIATRY AT FOLLOW UP. PT UP WITH THERAPY WITH A CANE. PT STATES HE ORDERED ONE FROM [facility] TO ARRIVE TODAY TO HIS HOME BY 6PM. DC PLAN TO RETURN HOME WITH WIFE. DENIES FURTHER NEEDS.

## 2023-07-14 NOTE — OP NOTE
Operative Note      Patient: Moriah Baker  YOB: 1960  MRN: [id number]    Date of Procedure: 7/13/2023    Pre-Op Diagnosis Codes:     * Open wound of toe, initial encounter [S91.109A]     * Gas gangrene of foot ([address]) [A48.0]    Post-Op Diagnosis: Post-Op Diagnosis Codes:     * Open wound of toe, initial encounter [S91.109A]     * Gas gangrene of foot ([address]) [A48.0]       Procedure(s):  DELAYED PRIMARY CLOSURE OF RIGHT BIG    Surgeon(s):  Blair Loza DPM    Assistant:   * No surgical staff found *    Anesthesia: Monitor Anesthesia Care    Estimated Blood Loss (mL): Minimal    Complications: None    Specimens:   * No specimens in log *    Implants:  * No implants in log *      Drains: * No LDAs found *    Findings: fibrotic tissue, well adhered fat    Detailed Description of Procedure:     OPERATIVE INDICATIONS: 61 y.o. male with a history of diabetes, hyperlipidemia, hypertension, who presents with 10 day history of worsening diabetic foot infection. S/p 1 medrol dose pack, s/p 10 day course of bactrim. Upon arrival in ED patient was vitally stable, however during ED course patient developed septic shock. Patient is now s/p I&D debridement of all non-viable tissue. Plan for debridement of all non-viable tissue and delayed primary closure given wound looks healthy for closure. After review of his condition, treatment options, both conservative and surgical, and the prognosis, benefit, potential risks and complications associated with each option, patient was in agreement with plan. OPERATIVE PROCEDURE: The patient was brought to the operating room and placed on the operating table in the supine position. After anesthesia was induced, a local anesthetic block consisting of 10 ccs 1:1 0.5% marcaine plain, 2% lidocaine plain was administered to the right 1st digit in an H-block fashion. The right foot was then prepped and draped in the usual aseptic manner.  The wound was debrided of all necrotic
Another 10ccs of 0.5% marcaine plain was administered in an H-block fashion to the right hallux. The remaining central wound was packed with betadine soaked packing, and dressed with 4x4s, ABD pad, and Kerlix. The patient appeared to have tolerated the anesthesia and procedure well and left the operating room on a cart. Patient was transferred to the intensive care unit as his vital signs were unstable, MAP<65mmhg. Vascular status intact to the right lower extremity. Patient's intraoperative and postoperative disposition were then discussed with family.      Final wound measurements are as followed: 4.5 cm x 3.0 cm x 0.5 cm = 6.75cm^3    Electronically signed by Fracisco Nunez DPM on 7/10/2023 at 4:43 PM

## 2023-07-14 NOTE — DISCHARGE INSTRUCTIONS
Keep dressing CDI until first post op visit with Dr. Indy Banks. May use cast cover for showers. Strict no weight bearing to surgical foot in a post op shoe.

## 2023-07-14 NOTE — PLAN OF CARE
Problem: Discharge Planning  Goal: Discharge to home or other facility with appropriate resources  Outcome: Progressing  Flowsheets (Taken 7/13/2023 2020)  Discharge to home or other facility with appropriate resources: Identify barriers to discharge with patient and caregiver

## 2023-07-14 NOTE — DISCHARGE SUMMARY
Body Location Override (Optional - Billing Will Still Be Based On Selected Body Map Location If Applicable): right upper v of neck Discharge Summary    Date: 7/14/2023  Patient Name: Yovana Badillo    YOB: 1960     Age: 61 y.o. Admit Date: 7/9/2023  Discharge Date: 7/14/2023  Discharge Condition: [place]    Admission Diagnosis  Septicemia ([address]) [A41.9]; Open wound of toe, initial encounter [S91.109A]; Sepsis ([address]) [A41.9]; Gas gangrene of foot ([address]) [A48.0]      Discharge Diagnosis  Principal Problem:    Septicemia ([address])  Active Problems:    Gas gangrene of foot ([address])    Type 2 diabetes mellitus without complication ([address])  Resolved Problems:    * No resolved hospital problems. [facility] Stay  Narrative of Hospital Course:  Patient comes with sepsis admitted to ICU and started on pressors and IV antibiotics secondary to diabetic foot infection. Eval by podiatry status post I&D for necrotizing foot infection. Patient will be discharged on 2 weeks of p.o. antibiotic with Augmentin. Consultants:  IP CONSULT TO PHARMACY  IP CONSULT TO INFECTIOUS DISEASES  IP CONSULT TO PODIATRY  IP CONSULT TO CRITICAL CARE    Surgeries/procedures Performed:      Treatments:            Discharge Plan/Disposition:  Home    Hospital/Incidental Findings Requiring Follow Up:    Patient Instructions:    Diet:    Activity:  For number of days (if applicable): Other Instructions:    Provider Follow-Up:   No follow-ups on file. Significant Diagnostic Studies:    Recent Labs:  Admission on 07/09/2023  No results displayed because visit has over 200 results. ------------    Radiology last 7 days:  XR TIBIA FIBULA RIGHT (2 VIEWS)    Result Date: 7/10/2023  Soft tissue swelling. Varicose veins. XR FOOT RIGHT (MIN 3 VIEWS)    Result Date: 7/10/2023  Multiple small gas collections along the medial great toe and plantar aspect consistent with gas gangrene without evidence of gross periostitis. CTA CHEST W WO CONTRAST    Result Date: 7/10/2023  No evidence of pulmonary embolism or acute pulmonary abnormality.   Dependent atelectatic changes Add 32514 Cpt? (Important Note: In 2017 The Use Of 25077 Is Being Tracked By Cms To Determine Future Global Period Reimbursement For Global Periods): yes Detail Level: Detailed

## 2023-07-15 LAB — BACTERIA BLD CULT ORG #2: NORMAL

## 2023-07-17 ENCOUNTER — TELEPHONE (OUTPATIENT)
Dept: INFECTIOUS DISEASES | Age: 63
End: 2023-07-17

## 2023-07-17 ENCOUNTER — TELEPHONE (OUTPATIENT)
Dept: FAMILY MEDICINE CLINIC | Age: 63
End: 2023-07-17

## 2023-07-17 NOTE — TELEPHONE ENCOUNTER
If he feels he is worsening since being discharged then I would recommend he return to ER to be evaluated.

## 2023-07-17 NOTE — TELEPHONE ENCOUNTER
Patient calls this morning with C/o of Feeling bloated, Retaining fluid \"body sttill full of fluid\" per patient. Also he is experiencing [employer]. Has Fuzzy vision. Patient was an Admit to [facility] and D/c last Friday, 7/14/23 on po Abx. Will update ID Physician for further recommendations     7/20/23 Per Dr Bertha Ring ok to finish Abx as prescribed if worsens, inform ID, and F/u with PCP as well.     7/21/23 Return call to patient, he states he saw the Podiatrist, he saw an Eye Dr yesterday, and will see a Retinal specialist on Mon. 7/24/23. Patient will F/u with a call next week, if he should need to be seen sooner than 7/28/23.

## 2023-07-17 NOTE — TELEPHONE ENCOUNTER
Pt had surgery last week on toe and his body is still bloated with fluids he was on an antibiotic iv he was in the hospital from last Cain til las Friday he did have sepsis he went is er Sunday with sepsis shock     Cell [phone]

## 2023-07-17 NOTE — TELEPHONE ENCOUNTER
Care Transitions Initial Follow Up Call    Outreach made within 2 business days of discharge: Yes    Patient: Chandrika Cunningham Patient : 1960   MRN: [id number]  Reason for Admission: There are no discharge diagnoses documented for the most recent discharge. Discharge Date: 23       Spoke with: Pt    Discharge department/facility: [facility] Patient Contact:  Was patient able to fill all prescriptions: Yes  Was patient instructed to bring all medications to the follow-up visit: Yes  Is patient taking all medications as directed in the discharge summary?  Yes  Does patient understand their discharge instructions: Yes  Does patient have questions or concerns that need addressed prior to 7-14 day follow up office visit: no    Scheduled appointment with PCP within 7-14 days    Follow Up  Future Appointments   Date Time Provider [address] [place] [address]   2023 10:15 AM [facility] EM[facility]GENC[facility] AT [place]   2023  2:30 PM Fabian Javier MD [address] [place] [address]   2023  2:00 PM Leelee Montanez MD [address] [place] [address]

## 2023-07-25 ENCOUNTER — OFFICE VISIT (OUTPATIENT)
Dept: FAMILY MEDICINE CLINIC | Age: 63
End: 2023-07-25

## 2023-07-25 VITALS
BODY MASS INDEX: 37.37 KG/M2 | SYSTOLIC BLOOD PRESSURE: 122 MMHG | DIASTOLIC BLOOD PRESSURE: 84 MMHG | OXYGEN SATURATION: 99 % | HEIGHT: 70 IN | WEIGHT: 261 LBS | HEART RATE: 74 BPM

## 2023-07-25 DIAGNOSIS — A48.0 GAS GANGRENE OF FOOT (HCC): Primary | ICD-10-CM

## 2023-07-25 DIAGNOSIS — A41.9 SEPTICEMIA (HCC): ICD-10-CM

## 2023-07-25 DIAGNOSIS — Z09 HOSPITAL DISCHARGE FOLLOW-UP: ICD-10-CM

## 2023-07-25 DIAGNOSIS — S91.109D OPEN WOUND OF TOE, SUBSEQUENT ENCOUNTER: ICD-10-CM

## 2023-07-25 RX ORDER — OMEPRAZOLE 20 MG/1
20 CAPSULE, DELAYED RELEASE ORAL DAILY
COMMUNITY

## 2023-07-25 NOTE — PROGRESS NOTES
Post-Discharge Transitional Care  Follow Up      Paola Chand   YOB: 1960    Date of Office Visit:  7/25/2023  Date of Hospital Admission: 7/9/23  Date of Hospital Discharge: 7/14/23  Risk of hospital readmission (high >=14%. Medium >=10%) :Readmission Risk Score: 10.1      Care management risk score Rising risk (score 2-5) and Complex Care (Scores >=6): No Risk Score On File     Non face to face  following discharge, date last encounter closed (first attempt may have been earlier): 07/17/2023    Call initiated 2 business days of discharge: Yes    ASSESSMENT/PLAN:   Gas gangrene of foot [facility])  Hospital discharge follow-up  -     [place] DISCHARGE MEDS RECONCILED W/ CURRENT OUTPATIENT MED LIST  Septicemia ([address])  Open wound of toe, subsequent encounter    Medical Decision Making: moderate complexity  Return in about 3 months (around 10/25/2023) for diabetes follow up. Continue follow up with podiatry and ID. F/u in 3 months for check up or sooner PRN. Side effects, adverse effects of the medication prescribed today, as well as treatment plan/ rationale and result expectations have been discussed with the patient who expresses understanding and desires to proceed. Close follow up to evaluate treatment results and for coordination of care. I have reviewed the patient's medical history in detail and updated the computerized patient record. As always, patient is advised that if symptoms worsen in any way they will proceed to the nearest emergency room. Subjective:   HPI:  Follow up of Hospital problems/diagnosis(es): septicemia, open wound of toe, gas gangrene of foot    Inpatient course: Discharge summary reviewed- see chart. \"Hospital Stay  Narrative of Hospital Course:  Patient comes with sepsis admitted to ICU and started on pressors and IV antibiotics secondary to diabetic foot infection. Eval by podiatry status post I&D for necrotizing foot infection.   Patient will be

## 2023-07-28 ENCOUNTER — OFFICE VISIT (OUTPATIENT)
Dept: INFECTIOUS DISEASES | Age: 63
End: 2023-07-28

## 2023-07-28 VITALS
TEMPERATURE: 97.2 F | RESPIRATION RATE: 16 BRPM | HEIGHT: 70 IN | BODY MASS INDEX: 37.8 KG/M2 | WEIGHT: 264 LBS | SYSTOLIC BLOOD PRESSURE: 123 MMHG | HEART RATE: 87 BPM | DIASTOLIC BLOOD PRESSURE: 84 MMHG

## 2023-07-28 DIAGNOSIS — L08.9 DIABETIC FOOT INFECTION (HCC): Primary | ICD-10-CM

## 2023-07-28 DIAGNOSIS — E11.628 DIABETIC FOOT INFECTION (HCC): Primary | ICD-10-CM

## 2023-07-28 ASSESSMENT — PATIENT HEALTH QUESTIONNAIRE - PHQ9
SUM OF ALL RESPONSES TO PHQ QUESTIONS 1-9: 0
2. FEELING DOWN, DEPRESSED OR HOPELESS: 0
1. LITTLE INTEREST OR PLEASURE IN DOING THINGS: 0
SUM OF ALL RESPONSES TO PHQ9 QUESTIONS 1 & 2: 0
SUM OF ALL RESPONSES TO PHQ QUESTIONS 1-9: 0

## 2023-07-28 ASSESSMENT — ENCOUNTER SYMPTOMS
RESPIRATORY NEGATIVE: 1
GASTROINTESTINAL NEGATIVE: 1

## 2023-07-28 NOTE — PROGRESS NOTES
Infectious Disease     Patient Name: Bella Bhakta  Date: 7/28/2023  YOB: 1960  Medical Record Number: [id number]    Diabetic foot infection    right great toe            7/13/2023  Diabetic foot infection right hallux  DELAYED PRIMARY CLOSURE OF RIGHT BIG        7/10/2023  RIGHT FIRST DIGIT HALLUX INVOLVING TISSUE AND NONVIABLE BONE        Culture Surgical      Abnormal   Direct Exam:  FEW NEUTROPHILS   Direct Exam:  FEW GRAM NEGATIVE RODS   Direct Exam:  FEW GRAM POSITIVE COCCI IN CLUSTERS   Culture, Surgical:  NORMAL SKIN BOWEN   Performed at [facility] [address], [address]   ([phone]   P      Organism Prevotella bivia Abnormal  P    Culture Surgical     MODERATE GROWTH   BETA LACTAMASE POSITIVE  P      Resulting Agency [facility]                     Narrative  Performed by: [facility]  ORDER#: [id number]                          ORDERED BY: Dionicio Phelan   SOURCE: Toe Swab                           COLLECTED:  07/10/23 07:42   ANTIBIOTICS AT ZEB.:                      RECEIVED :  07/10/23 08:46           Culture, Blood Id Sensitivity  Abnormal   Cult,Blood:  POSITIVE Blood Culture   Performed at [address] [facility], [address]   ([address] [facility] Abnormal     Resulting Agency [facility]           Narrative  Performed by: [facility]  ORDER#: [id number]                          ORDERED BY: Laura Valladares   SOURCE: Blood                              COLLECTED:  07/09/23 23:58   ANTIBIOTICS AT ZEB.:                      RECEIVED :  07/09/23 23:58                              Patient was discharged on Augmentin plan for 14 days      Review of Systems   Constitutional: Negative. Respiratory: Negative. Cardiovascular: Negative. Gastrointestinal: Negative. Skin:  Positive for wound.      Review of Systems: All 14 review of systems negative other than as stated

## 2023-08-08 LAB
CULTURE SURGICAL: ABNORMAL
CULTURE SURGICAL: ABNORMAL
ORGANISM: ABNORMAL

## 2023-08-10 ENCOUNTER — OFFICE VISIT (OUTPATIENT)
Dept: INFECTIOUS DISEASES | Age: 63
End: 2023-08-10

## 2023-08-10 VITALS
HEIGHT: 70 IN | BODY MASS INDEX: 37.8 KG/M2 | DIASTOLIC BLOOD PRESSURE: 86 MMHG | SYSTOLIC BLOOD PRESSURE: 128 MMHG | TEMPERATURE: 97.2 F | OXYGEN SATURATION: 97 % | HEART RATE: 76 BPM | WEIGHT: 264 LBS

## 2023-08-10 DIAGNOSIS — Z98.890 STATUS POST INCISION AND DRAINAGE: ICD-10-CM

## 2023-08-10 DIAGNOSIS — L03.031 CELLULITIS OF GREAT TOE OF RIGHT FOOT: ICD-10-CM

## 2023-08-10 DIAGNOSIS — E11.628 DIABETIC FOOT INFECTION (HCC): Primary | ICD-10-CM

## 2023-08-10 DIAGNOSIS — L08.9 DIABETIC FOOT INFECTION (HCC): Primary | ICD-10-CM

## 2023-08-10 ASSESSMENT — PATIENT HEALTH QUESTIONNAIRE - PHQ9
SUM OF ALL RESPONSES TO PHQ QUESTIONS 1-9: 0
SUM OF ALL RESPONSES TO PHQ QUESTIONS 1-9: 0
1. LITTLE INTEREST OR PLEASURE IN DOING THINGS: 0
SUM OF ALL RESPONSES TO PHQ9 QUESTIONS 1 & 2: 0
2. FEELING DOWN, DEPRESSED OR HOPELESS: 0
SUM OF ALL RESPONSES TO PHQ QUESTIONS 1-9: 0
SUM OF ALL RESPONSES TO PHQ QUESTIONS 1-9: 0

## 2023-08-31 ENCOUNTER — OFFICE VISIT (OUTPATIENT)
Dept: ENDOCRINOLOGY | Age: 63
End: 2023-08-31

## 2023-08-31 VITALS
HEIGHT: 70 IN | HEART RATE: 90 BPM | OXYGEN SATURATION: 98 % | SYSTOLIC BLOOD PRESSURE: 106 MMHG | WEIGHT: 263 LBS | BODY MASS INDEX: 37.65 KG/M2 | DIASTOLIC BLOOD PRESSURE: 72 MMHG

## 2023-08-31 DIAGNOSIS — E11.65 POORLY CONTROLLED DIABETES MELLITUS (HCC): Primary | ICD-10-CM

## 2023-08-31 DIAGNOSIS — Z98.84 S/P GASTRIC BYPASS: ICD-10-CM

## 2023-08-31 LAB
CHP ED QC CHECK: NORMAL
GLUCOSE BLD-MCNC: 113 MG/DL
HBA1C MFR BLD: 8.8 %

## 2023-08-31 RX ORDER — INSULIN ASPART 100 [IU]/ML
INJECTION, SOLUTION INTRAVENOUS; SUBCUTANEOUS
Qty: 10 ADJUSTABLE DOSE PRE-FILLED PEN SYRINGE | Refills: 3 | Status: SHIPPED | OUTPATIENT
Start: 2023-08-31

## 2023-08-31 NOTE — PROGRESS NOTES
He is alert and oriented to person, place, and time.    Psychiatric:         Mood and Affect: Mood normal.         Behavior: Behavior normal.

## 2023-09-01 ENCOUNTER — TELEPHONE (OUTPATIENT)
Dept: ENDOCRINOLOGY | Age: 63
End: 2023-09-01

## 2023-11-22 DIAGNOSIS — E11.65 POORLY CONTROLLED DIABETES MELLITUS (HCC): ICD-10-CM

## 2024-01-05 RX ORDER — BLOOD-GLUCOSE SENSOR
EACH MISCELLANEOUS
Qty: 2 EACH | Refills: 3 | Status: SHIPPED | OUTPATIENT
Start: 2024-01-05

## 2024-06-24 RX ORDER — BLOOD-GLUCOSE SENSOR
EACH MISCELLANEOUS
Qty: 2 EACH | Refills: 1 | Status: SHIPPED | OUTPATIENT
Start: 2024-06-24 | End: 2024-06-25 | Stop reason: SDUPTHER

## 2024-06-24 RX ORDER — INSULIN LISPRO 100 [IU]/ML
INJECTION, SOLUTION INTRAVENOUS; SUBCUTANEOUS
Qty: 15 ML | Refills: 1 | Status: SHIPPED | OUTPATIENT
Start: 2024-06-24 | End: 2024-06-25 | Stop reason: SDUPTHER

## 2024-06-25 RX ORDER — INSULIN LISPRO 100 [IU]/ML
INJECTION, SOLUTION INTRAVENOUS; SUBCUTANEOUS
Qty: 15 ML | Refills: 1 | Status: SHIPPED | OUTPATIENT
Start: 2024-06-25

## 2024-06-25 RX ORDER — BLOOD-GLUCOSE SENSOR
EACH MISCELLANEOUS
Qty: 2 EACH | Refills: 1 | Status: SHIPPED | OUTPATIENT
Start: 2024-06-25

## 2024-08-12 ENCOUNTER — OFFICE VISIT (OUTPATIENT)
Dept: ENDOCRINOLOGY | Age: 64
End: 2024-08-12
Payer: COMMERCIAL

## 2024-08-12 VITALS
DIASTOLIC BLOOD PRESSURE: 79 MMHG | WEIGHT: 283 LBS | HEART RATE: 73 BPM | HEIGHT: 70 IN | OXYGEN SATURATION: 97 % | BODY MASS INDEX: 40.52 KG/M2 | SYSTOLIC BLOOD PRESSURE: 128 MMHG

## 2024-08-12 DIAGNOSIS — E66.01 MORBID OBESITY (HCC): ICD-10-CM

## 2024-08-12 DIAGNOSIS — E11.65 POORLY CONTROLLED DIABETES MELLITUS (HCC): Primary | ICD-10-CM

## 2024-08-12 DIAGNOSIS — L97.512 ULCER OF RIGHT FOOT WITH FAT LAYER EXPOSED (HCC): ICD-10-CM

## 2024-08-12 LAB
CHP ED QC CHECK: NORMAL
GLUCOSE BLD-MCNC: 129 MG/DL
HBA1C MFR BLD: 9 %

## 2024-08-12 PROCEDURE — 99214 OFFICE O/P EST MOD 30 MIN: CPT | Performed by: INTERNAL MEDICINE

## 2024-08-12 PROCEDURE — 3078F DIAST BP <80 MM HG: CPT | Performed by: INTERNAL MEDICINE

## 2024-08-12 PROCEDURE — 3074F SYST BP LT 130 MM HG: CPT | Performed by: INTERNAL MEDICINE

## 2024-08-12 PROCEDURE — 83036 HEMOGLOBIN GLYCOSYLATED A1C: CPT | Performed by: INTERNAL MEDICINE

## 2024-08-12 PROCEDURE — 3052F HG A1C>EQUAL 8.0%<EQUAL 9.0%: CPT | Performed by: INTERNAL MEDICINE

## 2024-08-12 PROCEDURE — 95251 CONT GLUC MNTR ANALYSIS I&R: CPT | Performed by: INTERNAL MEDICINE

## 2024-08-12 PROCEDURE — 82962 GLUCOSE BLOOD TEST: CPT | Performed by: INTERNAL MEDICINE

## 2024-08-12 RX ORDER — INSULIN LISPRO 100 [IU]/ML
INJECTION, SOLUTION INTRAVENOUS; SUBCUTANEOUS
Qty: 15 ML | Refills: 1 | Status: SHIPPED | OUTPATIENT
Start: 2024-08-12

## 2024-08-12 RX ORDER — PEN NEEDLE, DIABETIC 32 GX 1/6"
NEEDLE, DISPOSABLE MISCELLANEOUS
Qty: 200 EACH | Refills: 3 | Status: SHIPPED | OUTPATIENT
Start: 2024-08-12

## 2024-08-12 RX ORDER — BLOOD-GLUCOSE SENSOR
EACH MISCELLANEOUS
Qty: 2 EACH | Refills: 3 | Status: SHIPPED | OUTPATIENT
Start: 2024-08-12

## 2024-08-12 RX ORDER — INSULIN GLARGINE 100 [IU]/ML
INJECTION, SOLUTION SUBCUTANEOUS
Qty: 15 ML | Refills: 3 | Status: SHIPPED | OUTPATIENT
Start: 2024-08-12

## 2024-08-12 NOTE — PROGRESS NOTES
2024    Assessment:       Diagnosis Orders   1. Poorly controlled diabetes mellitus (HCC)  POCT Glucose    POCT glycosylated hemoglobin (Hb A1C)    Basic Metabolic Panel    Hemoglobin A1C    HM DIABETES FOOT EXAM    Microalbumin / Creatinine Urine Ratio    Continuous Glucose Sensor (FREESTYLE CODY 3 SENSOR) [facility]    Insulin Pen Needle (NOVOFINE PLUS PEN NEEDLE) 32G X 4 MM [facility]    63979 - GLUCOSE MONITOR, 72 HOUR, PHYS INTERP      2. Ulcer of right foot with fat layer exposed (HCC)        3. Morbid obesity (HCC)              PLAN:     Orders Placed This Encounter   Procedures    35656 - GLUCOSE MONITOR, 72 HOUR, PHYS INTERP    Basic Metabolic Panel     Standing Status:   Future     Standing Expiration Date:   2025    Hemoglobin A1C     Standing Status:   Future     Standing Expiration Date:   2025    Microalbumin / Creatinine Urine Ratio     Standing Status:   Future     Standing Expiration Date:   2025    [facility] [place]     Referral Priority:   Routine     Referral Type:   Eval and Treat     Referral Reason:   Specialty Services Required     Number of Visits Requested:   1    POCT Glucose    POCT glycosylated hemoglobin (Hb A1C)     DIABETES FOOT EXAM     Orders Placed This Encounter   Medications    Continuous Glucose Sensor (FREESTYLE CODY 3 SENSOR) [facility]     Sig: Change every 14 days     Dispense:  2 each     Refill:  3    Insulin Pen Needle (NOVOFINE PLUS PEN NEEDLE) 32G X 4 MM [facility]ISC     Sig: qid     Dispense:  200 each     Refill:  3    insulin lispro, 1 Unit Dial, (HUMALOG KWIKPEN) 100 UNIT/ML SOPN     Sig: Inject 8-12  units TID with meals     Dispense:  15 mL     Refill:  1    insulin glargine (LANTUS SOLOSTAR) 100 UNIT/ML injection pen     Si-30 units at bedtime     Dispense:  15 mL     Refill:  3     Start patient on Lantus 25 to 30 units at bedtime continue Humalog 8 to 12 units with each meals use freestyle cody refer patient

## 2024-08-13 ENCOUNTER — HOSPITAL ENCOUNTER (OUTPATIENT)
Dept: WOUND CARE | Age: 64
Discharge: HOME | End: 2024-08-13
Attending: STUDENT IN AN ORGANIZED HEALTH CARE EDUCATION/TRAINING PROGRAM
Payer: COMMERCIAL

## 2024-08-13 VITALS
TEMPERATURE: 96.3 F | DIASTOLIC BLOOD PRESSURE: 86 MMHG | SYSTOLIC BLOOD PRESSURE: 155 MMHG | RESPIRATION RATE: 16 BRPM | HEART RATE: 75 BPM

## 2024-08-13 PROCEDURE — 11043 DBRDMT MUSC&/FSCA 1ST 20/<: CPT

## 2024-08-13 PROCEDURE — 99213 OFFICE O/P EST LOW 20 MIN: CPT

## 2024-08-13 ASSESSMENT — PAIN DESCRIPTION - FREQUENCY: FREQUENCY: INTERMITTENT

## 2024-08-13 ASSESSMENT — PAIN DESCRIPTION - ORIENTATION: ORIENTATION: RIGHT

## 2024-08-13 ASSESSMENT — PAIN DESCRIPTION - DESCRIPTORS: DESCRIPTORS: ACHING;DISCOMFORT

## 2024-08-13 ASSESSMENT — PAIN SCALES - GENERAL: PAINLEVEL_OUTOF10: 3

## 2024-08-13 ASSESSMENT — PAIN DESCRIPTION - LOCATION: LOCATION: TOE (COMMENT WHICH ONE)

## 2024-08-13 NOTE — PLAN OF CARE
Problem: Chronic Conditions and Co-morbidities  Goal: Patient's chronic conditions and co-morbidity symptoms are monitored and maintained or improved  Outcome: Progressing     Problem: Pain  Goal: Verbalizes/displays adequate comfort level or baseline comfort level  Outcome: Progressing     Problem: Wound:  Goal: Will show signs of wound healing; wound closure and no evidence of infection  Outcome: Progressing

## 2024-08-13 NOTE — DISCHARGE INSTRUCTIONS
[facility]   Physician Orders and Discharge Instructions  [address] [place]  [address]  Telephone: [phone]      [phone]      NAME:  Willis Mahajan          YOB: 1960  MEDICAL RECORD NUMBER:  [id number]    Your  is:  Gris    Home Care/Facility:    Wound Location: Right Great Toe     Dressing orders: 1.Cleanse wound(s) with normal saline.  2. Apply dry EDWINA  Or equivalent to wound bed.  3. Moisten EDWINA with a few drops of normal saline.  4. Cover with a folded piece of gauze and then a bandaid   5. Change daily.        Compression: n/a    Offloading Device:Post op shoe with felt padding     Other Instructions:  It is ok to shower, just use antibacterial soap and cleanse the wound last with a clean wash cloth and immediatly do dressing change after.    Keep all dressings clean, dry and intact.  Keep pressure off the wound(s) at all times.     Follow up visit   1 Week August 20, 2024 at  1:00pm   Please have someone drive you so we can apply a total contact cast.     Please give 24 hour notice if unable to keep appointment. [phone]    If you experience any of the following, please call the Wound Care Service at  [phone] or go to the nearest emergency room.   *Increase in pain *Temperature over 101 *Increase in drainage from your wound or a foul odor  *Uncontrolled swelling *Need for compression bandage changes due to slippage, breakthrough drainage       PLEASE NOTE: IF YOU ARE UNABLE TO OBTAIN WOUND SUPPLIES, CONTINUE TO USE THE SUPPLIES YOU HAVE AVAILABLE UNTIL YOU ARE ABLE TO REACH US. IT IS MOST IMPORTANT TO KEEP THE WOUND COVERED AT ALL TIMES

## 2024-08-13 NOTE — WOUND CARE
[facility]   Progress Note and Procedure Note      Willis Mahajan  MEDICAL RECORD NUMBER:  [id number]  AGE: 64 y.o.   GENDER: male  : 1960  EPISODE DATE:  2024    Subjective:     Chief Complaint   Patient presents with    Wound Check         HISTORY of PRESENT ILLNESS HPI     Willis Mahajan is a 64 y.o. male who presents today for wound/ulcer evaluation.   History of Wound Context:     Patient seen and evaluated he presents with chronic ulceration to his right great toe patient has been treated for infection.  He notes that this was over a year prior and he has been dealing with recurrent ulcerations ever since.  He states his hemoglobin A1c is above 9 at this time    Wound/Ulcer Pain Timing/Severity: intermittent  Quality of pain: aching  Severity:  2 / 10   Modifying Factors: Pain worsens with walking  Associated Signs/Symptoms: edema    Ulcer Identification:  Ulcer Type: diabetic  Contributing Factors: edema    Wound: N/A        PAST MEDICAL HISTORY        Diagnosis Date    ED (erectile dysfunction)     Erectile dysfunction     GERD (gastroesophageal reflux disease)     History of cardiovascular stress test     Normal; Saw Dr. Blas    History of EKG 10/29/2014    Hyperlipidemia     Hypertension     Hypogonadism in male 2017    Obesity, morbid, BMI 50 or higher (HCC)     EVELYN on CPAP     S/P laparoscopic sleeve gastrectomy 2018    Dr. Marissa Kern    Type 2 diabetes mellitus without complication ([facility]) 07/10/2023    Type II or unspecified type diabetes mellitus without mention of complication, not stated as uncontrolled     Vitamin D deficiency 10/17/2008       PAST SURGICAL HISTORY    Past Surgical History:   Procedure Laterality Date    COLONOSCOPY  2013    FOOT DEBRIDEMENT Right 2023    DELAYED PRIMARY CLOSURE OF RIGHT BIG performed by Amado Carpenter DPM at [facility] [place]    SLEEVE GASTRECTOMY  2018    [facility]    TOE AMPUTATION Right 7/10/2023

## 2024-09-23 DIAGNOSIS — E11.65 POORLY CONTROLLED DIABETES MELLITUS (HCC): Primary | ICD-10-CM

## 2024-09-23 RX ORDER — LANCETS 30 GAUGE
1 EACH MISCELLANEOUS 3 TIMES DAILY
Qty: 100 EACH | Refills: 3 | Status: SHIPPED | OUTPATIENT
Start: 2024-09-23

## 2024-09-23 RX ORDER — BLOOD-GLUCOSE METER
EACH MISCELLANEOUS
Qty: 1 KIT | Refills: 0 | Status: SHIPPED | OUTPATIENT
Start: 2024-09-23

## 2024-11-07 RX ORDER — INSULIN LISPRO 100 [IU]/ML
INJECTION, SOLUTION INTRAVENOUS; SUBCUTANEOUS
Qty: 15 ML | Refills: 1 | Status: SHIPPED | OUTPATIENT
Start: 2024-11-07

## 2025-02-21 RX ORDER — INSULIN LISPRO 100 [IU]/ML
INJECTION, SOLUTION INTRAVENOUS; SUBCUTANEOUS
Qty: 15 ML | Refills: 3 | Status: SHIPPED | OUTPATIENT
Start: 2025-02-21

## 2025-02-25 RX ORDER — INSULIN LISPRO 100 [IU]/ML
INJECTION, SOLUTION INTRAVENOUS; SUBCUTANEOUS
Qty: 15 ML | Refills: 3 | Status: SHIPPED | OUTPATIENT
Start: 2025-02-25

## 2025-04-24 ENCOUNTER — TELEPHONE (OUTPATIENT)
Dept: FAMILY MEDICINE CLINIC | Age: 65
End: 2025-04-24

## 2025-04-24 NOTE — TELEPHONE ENCOUNTER
Spoke to patient, informed him that since it has been over a year since he has had a in office physical visit with Ritu, that she would not be able to sign his [employer] Medical Certification of Illness Form and that she recommended for him to have one of his specialty doctors that he regularly see's fill it out.

## 2025-06-09 ENCOUNTER — OFFICE VISIT (OUTPATIENT)
Dept: FAMILY MEDICINE CLINIC | Age: 65
End: 2025-06-09
Payer: MEDICARE

## 2025-06-09 VITALS
WEIGHT: 285 LBS | DIASTOLIC BLOOD PRESSURE: 84 MMHG | HEART RATE: 87 BPM | BODY MASS INDEX: 40.8 KG/M2 | OXYGEN SATURATION: 99 % | TEMPERATURE: 97.6 F | HEIGHT: 70 IN | SYSTOLIC BLOOD PRESSURE: 126 MMHG

## 2025-06-09 DIAGNOSIS — M54.42 ACUTE BACK PAIN WITH SCIATICA, LEFT: Primary | ICD-10-CM

## 2025-06-09 PROCEDURE — 3017F COLORECTAL CA SCREEN DOC REV: CPT | Performed by: NURSE PRACTITIONER

## 2025-06-09 PROCEDURE — 1036F TOBACCO NON-USER: CPT | Performed by: NURSE PRACTITIONER

## 2025-06-09 PROCEDURE — G8427 DOCREV CUR MEDS BY ELIG CLIN: HCPCS | Performed by: NURSE PRACTITIONER

## 2025-06-09 PROCEDURE — 99213 OFFICE O/P EST LOW 20 MIN: CPT | Performed by: NURSE PRACTITIONER

## 2025-06-09 PROCEDURE — 3079F DIAST BP 80-89 MM HG: CPT | Performed by: NURSE PRACTITIONER

## 2025-06-09 PROCEDURE — G8417 CALC BMI ABV UP PARAM F/U: HCPCS | Performed by: NURSE PRACTITIONER

## 2025-06-09 PROCEDURE — 1123F ACP DISCUSS/DSCN MKR DOCD: CPT | Performed by: NURSE PRACTITIONER

## 2025-06-09 PROCEDURE — 3074F SYST BP LT 130 MM HG: CPT | Performed by: NURSE PRACTITIONER

## 2025-06-09 RX ORDER — BACLOFEN 10 MG/1
5 TABLET ORAL 3 TIMES DAILY
Qty: 15 TABLET | Refills: 0 | Status: SHIPPED | OUTPATIENT
Start: 2025-06-09

## 2025-06-09 RX ORDER — SULFAMETHOXAZOLE AND TRIMETHOPRIM 800; 160 MG/1; MG/1
1 TABLET ORAL 2 TIMES DAILY
COMMUNITY
Start: 2025-04-11

## 2025-06-09 RX ORDER — KETOROLAC TROMETHAMINE 10 MG/1
10 TABLET, FILM COATED ORAL EVERY 6 HOURS PRN
Qty: 20 TABLET | Refills: 0 | Status: SHIPPED | OUTPATIENT
Start: 2025-06-09 | End: 2026-06-09

## 2025-06-09 SDOH — ECONOMIC STABILITY: FOOD INSECURITY: WITHIN THE PAST 12 MONTHS, YOU WORRIED THAT YOUR FOOD WOULD RUN OUT BEFORE YOU GOT MONEY TO BUY MORE.: NEVER TRUE

## 2025-06-09 SDOH — ECONOMIC STABILITY: FOOD INSECURITY: WITHIN THE PAST 12 MONTHS, THE FOOD YOU BOUGHT JUST DIDN'T LAST AND YOU DIDN'T HAVE MONEY TO GET MORE.: NEVER TRUE

## 2025-06-09 ASSESSMENT — PATIENT HEALTH QUESTIONNAIRE - PHQ9
SUM OF ALL RESPONSES TO PHQ QUESTIONS 1-9: 0
1. LITTLE INTEREST OR PLEASURE IN DOING THINGS: NOT AT ALL
SUM OF ALL RESPONSES TO PHQ QUESTIONS 1-9: 0
2. FEELING DOWN, DEPRESSED OR HOPELESS: NOT AT ALL

## 2025-06-09 ASSESSMENT — ENCOUNTER SYMPTOMS
BACK PAIN: 1
CONSTIPATION: 0
COLOR CHANGE: 0
ABDOMINAL PAIN: 0
VOMITING: 0
SHORTNESS OF BREATH: 0
NAUSEA: 0
DIARRHEA: 0
CHEST TIGHTNESS: 0

## 2025-06-11 ENCOUNTER — TELEPHONE (OUTPATIENT)
Dept: FAMILY MEDICINE CLINIC | Age: 65
End: 2025-06-11

## 2025-06-11 NOTE — TELEPHONE ENCOUNTER
CASSIA Dennis- provided pt with a temporary supply of Ketorolac medication. This drug is not covered on our formulary. Alternative medications that are covered are Flurbiprofen, ibuprofen, meloxicam, nabumwtone, naproxen.

## 2025-06-11 NOTE — TELEPHONE ENCOUNTER
If patient was able to  a short prescription of the ketorolac this is fine as medication was only meant to be temporary.  If he continues to have symptoms and needs long-term anti-inflammatory we can switch to naproxen

## 2025-06-12 NOTE — TELEPHONE ENCOUNTER
Pt has continued the Toradol. Not having any relief is asking if anything could be sent in for pain. Also was taking naproxen with no relief    [facility]-     Pt has a [employer] Form that needs to be filled out. Ritu is listed as PCP but has not been seen since 2023.  Asking if this could be filled out?

## 2025-06-13 ENCOUNTER — TELEPHONE (OUTPATIENT)
Dept: FAMILY MEDICINE CLINIC | Age: 65
End: 2025-06-13

## 2025-06-13 ENCOUNTER — RESULTS FOLLOW-UP (OUTPATIENT)
Dept: FAMILY MEDICINE CLINIC | Age: 65
End: 2025-06-13

## 2025-06-13 DIAGNOSIS — M51.360 DEGENERATION OF INTERVERTEBRAL DISC OF LUMBAR REGION WITH DISCOGENIC BACK PAIN: Primary | ICD-10-CM

## 2025-06-13 RX ORDER — TRAMADOL HYDROCHLORIDE 50 MG/1
50 TABLET ORAL 3 TIMES DAILY PRN
Qty: 21 TABLET | Refills: 0 | Status: SHIPPED | OUTPATIENT
Start: 2025-06-13 | End: 2025-06-20

## 2025-06-18 ENCOUNTER — HOSPITAL ENCOUNTER (OUTPATIENT)
Dept: PHYSICAL THERAPY | Age: 65
Setting detail: THERAPIES SERIES
Discharge: HOME | End: 2025-06-18
Payer: MEDICARE

## 2025-06-18 PROCEDURE — 97110 THERAPEUTIC EXERCISES: CPT

## 2025-06-18 PROCEDURE — 97162 PT EVAL MOD COMPLEX 30 MIN: CPT

## 2025-06-18 PROCEDURE — G0283 ELEC STIM OTHER THAN WOUND: HCPCS

## 2025-06-18 NOTE — PLAN OF CARE
extensibility  Functional ability(s) targeted: Tolerance to age appropriate activities  Unbillable time (minutes): 10  Electric stimulation, unattended (CPT 49898) /  (Medicare)  E-stim location: Left, Low back  E-stim specified location: buttocks  E-stim type: Interferential (IFC)  E-stim via: 4 Electrode Pads  Post treatment skin assessment: Intact  Limitations addressed: Pain modulation, Tissue extensibility  Functional ability(s) targeted: Tolerance to age appropriate activities, Ambulating community distances  Untimed (minutes): 10     Manual  Manual Therapy  Soft Tissue Mobilizaton: * piriformis/ sciatic area, TFL  Other: * consider MFD cupping, IDN     *Indicates exercise,modality, or manual techniques to be initiated when appropriate    ASSESSMENT   Body Structures, Functions, Activity Limitations Requiring Skilled Therapeutic Intervention: Decreased functional mobility , Decreased ROM, Decreased ADL status, Decreased strength, Increased pain  Assessment: Pt presents with increased LBP with radicular pain Lt LE to ankle. Pt states pain is affecting most ADLs and all mobility, now using ww for gait. Pt's mobility is further compromised by recent Rt big toe Sx. Pt demonstrates decreased proximal Lt LE strength and overall decreased flexibility. (+) slump test and increased pain with LARISSA/ FADIR, SLR, and thigh thrust. Increased pain with palpation lumbar paraspinals, as well as piriformis area. Pt would benefit from skilled OP PT to address deficits and return to previous function with minimal c/o pain. Pt reports currently functioning </= 20% of normal. Concluded treatment with estim/ HP due to high levels of pain reported. Initiated ther ex with fair tolerance. Increased pain with attempted extension. Will progress as tolerated and instructed pt to keep all movement within tolerable range.    Outcome Measure: YANIQUE 34/50     Oswestry Disability Index   Pain Intensity: (Patient-Rptd) D. The pain is

## 2025-06-23 ENCOUNTER — HOSPITAL ENCOUNTER (OUTPATIENT)
Dept: PHYSICAL THERAPY | Age: 65
Setting detail: THERAPIES SERIES
Discharge: HOME | End: 2025-06-23
Payer: MEDICARE

## 2025-06-23 PROCEDURE — 97110 THERAPEUTIC EXERCISES: CPT

## 2025-06-23 PROCEDURE — G0283 ELEC STIM OTHER THAN WOUND: HCPCS

## 2025-06-23 PROCEDURE — 97140 MANUAL THERAPY 1/> REGIONS: CPT

## 2025-06-23 ASSESSMENT — PAIN SCALES - GENERAL: PAINLEVEL_OUTOF10: 5

## 2025-06-23 ASSESSMENT — PAIN DESCRIPTION - LOCATION: LOCATION: BACK

## 2025-06-23 ASSESSMENT — PAIN DESCRIPTION - DESCRIPTORS: DESCRIPTORS: THROBBING

## 2025-06-23 NOTE — PROGRESS NOTES
[facility]  Outpatient Physical Therapy    Treatment Note        Date: 2025  Patient: Willis Mahajan  : 1960   Confirmed: Yes  MRN: [id number]  Referring Provider: Enrico Mendes APRN - C*   Secondary Referring Provider (If applicable):     Medical Diagnosis: Degeneration of intervertebral disc of lumbar region with discogenic back pain [M51.360] Degeneration of intervertebral disc of lumbar region with discogenic back pain  Treatment Diagnosis: difficulty with gait, impaired mobility, LBP, Lt LE radicular pain, Lt LE weakness, decreased flexibility    Visit Information:  Insurance: Payor: MEDICARE / Plan: MEDICARE PART A AND B / Product Type: *No Product type* /   PT Visit Information  Onset Date:  (2 wks ago)  PT Insurance Information: Medicare  Total # of Visits Approved:  (based on medical necessity)  Total # of Visits to Date: 2  Plan of Care/Certification Expiration Date: 25  No Show: 0  Progress Note Due Date: 25  Canceled Appointment: 0  Progress Note Counter: 2/10    Subjective Information:  Subjective: Reports sharp pain comes and goes.  HEP Compliance:  [x] Good [] Fair [] Poor [] Reports not doing due to:    Pain Screening  Pain Level: 5  Pain Location: Back  Pain Descriptors: Throbbing    Treatment:  Exercises:  Exercises  Exercise 2: LTR x10  Exercise 3: supine modified pifiromis str 30 sec/ 3 bilaterally  Exercise 5: supine pelvic tilt 5 sec/ 10  Exercise 6: TA isometrics 5 sec/ 10  Exercise 7: Nu step L1 x5 min  Exercise 8: seated hams str 30 sec/ 3  Exercise 9: sit to stand with pelvic tilt and forward lean x3  Exercise 13: prone GS 5 sec/ 10  Exercise 20: HEP: pelvic tilt, hams str       Manual:   Manual Therapy  Soft Tissue Mobilizaton: piriformis/ sciatic area, TFL with foam roller/ tennis ball  Other: * consider MFD cupping, IDN       Modalities:  Moist Heat (CPT 38504)  Patient Position: R sidelying  Moist heat location: Low back, Hip,

## 2025-06-26 ENCOUNTER — HOSPITAL ENCOUNTER (OUTPATIENT)
Dept: PHYSICAL THERAPY | Age: 65
Setting detail: THERAPIES SERIES
Discharge: HOME | End: 2025-06-26
Payer: MEDICARE

## 2025-06-26 PROCEDURE — G0283 ELEC STIM OTHER THAN WOUND: HCPCS

## 2025-06-26 PROCEDURE — 97110 THERAPEUTIC EXERCISES: CPT

## 2025-06-26 PROCEDURE — 97140 MANUAL THERAPY 1/> REGIONS: CPT

## 2025-06-26 ASSESSMENT — PAIN DESCRIPTION - LOCATION: LOCATION: BACK

## 2025-06-26 ASSESSMENT — PAIN SCALES - GENERAL: PAINLEVEL_OUTOF10: 5

## 2025-06-26 ASSESSMENT — PAIN DESCRIPTION - DESCRIPTORS: DESCRIPTORS: THROBBING

## 2025-06-26 NOTE — PROGRESS NOTES
[facility]  Outpatient Physical Therapy    Treatment Note        Date: 2025  Patient: Willis Mahajan  : 1960   Confirmed: Yes  MRN: [id number]  Referring Provider: Enrico Mendes APRN - C*   Secondary Referring Provider (If applicable):     Medical Diagnosis: Degeneration of intervertebral disc of lumbar region with discogenic back pain [M51.360] Degeneration of intervertebral disc of lumbar region with discogenic back pain  Treatment Diagnosis: difficulty with gait, impaired mobility, LBP, Lt LE radicular pain, Lt LE weakness, decreased flexibility    Visit Information:  Insurance: Payor: MEDICARE / Plan: MEDICARE PART A AND B / Product Type: *No Product type* /   PT Visit Information  Onset Date:  (2 wks ago)  PT Insurance Information: Medicare  Total # of Visits Approved:  (based on medical necessity)  Total # of Visits to Date: 3  Plan of Care/Certification Expiration Date: 25  No Show: 0  Progress Note Due Date: 25  Canceled Appointment: 0  Progress Note Counter: 3/10    Subjective Information:  Subjective: Pt reports \"feels like it's helping\"  HEP Compliance:  [x] Good [] Fair [] Poor [] Reports not doing due to:    Pain Screening  Pain Level: 5  Pain Location: Back  Pain Descriptors: Throbbing    Treatment:  Exercises:  Exercises  Exercise 2: LTR x10 with pelvic tilt  Exercise 3: seated modified piriformis str 30 sec/ 3  Exercise 4: supine Pball LTR, DKTC, bridges x10 ea  Exercise 7: Nu step L2 x5 min  Exercise 12: single steps with focus on glute contraction x10  Exercise 14: SLR, sidelying abd x10, unable to do SLR Lt, QS Lt 5sec/ 10  Exercise 15: clams x10  Exercise 20: HEP: continue current       Manual:   Manual Therapy  Soft Tissue Mobilizaton: piriformis/ sciatic area, TFL with foam roller/ tennis ball, anterior tib  Treatment Reasoning  Functional ability(s) targeted: Tolerance to age appropriate activities    Modalities:  Moist Heat (CPT

## 2025-06-30 ENCOUNTER — HOSPITAL ENCOUNTER (OUTPATIENT)
Dept: PHYSICAL THERAPY | Age: 65
Setting detail: THERAPIES SERIES
Discharge: HOME | End: 2025-06-30
Payer: MEDICARE

## 2025-06-30 PROCEDURE — G0283 ELEC STIM OTHER THAN WOUND: HCPCS

## 2025-06-30 PROCEDURE — 97110 THERAPEUTIC EXERCISES: CPT

## 2025-06-30 ASSESSMENT — PAIN SCALES - GENERAL: PAINLEVEL_OUTOF10: 6

## 2025-06-30 ASSESSMENT — PAIN DESCRIPTION - LOCATION: LOCATION: BACK;LEG;KNEE

## 2025-06-30 ASSESSMENT — PAIN DESCRIPTION - DESCRIPTORS: DESCRIPTORS: THROBBING

## 2025-06-30 NOTE — PROGRESS NOTES
[facility]  Outpatient Physical Therapy    Treatment Note        Date: 2025  Patient: Willis Mahajan  : 1960   Confirmed: Yes  MRN: [id number]  Referring Provider: Enrico Mendes APRN - C*   Secondary Referring Provider (If applicable):     Medical Diagnosis: Degeneration of intervertebral disc of lumbar region with discogenic back pain [M51.360]    Treatment Diagnosis: difficulty with gait, impaired mobility, LBP, Lt LE radicular pain, Lt LE weakness, decreased flexibility    Visit Information:  Insurance: Payor: MEDICARE / Plan: MEDICARE PART A AND B / Product Type: *No Product type* /   PT Visit Information  Onset Date:  (2 wks ago)  PT Insurance Information: Medicare  Total # of Visits Approved:  (based on medical necessity)  Total # of Visits to Date: 4  Plan of Care/Certification Expiration Date: 25  No Show: 0  Progress Note Due Date: 25  Canceled Appointment: 0  Progress Note Counter: 4/10    Subjective Information:  Subjective: Pt reports doing to much over the weekend.  HEP Compliance:  [x] Good [] Fair [] Poor [] Reports not doing due to:    Pain Screening  Patient Currently in Pain: Yes  Pain Level: 6  Pain Location: Back, Leg, Knee  Pain Descriptors: Throbbing    Treatment:  Exercises:  Exercises  Exercise 2: LTR x10 with pelvic tilt  Exercise 3: supine piriformis str and knee to opposite shoulder 30 sec/ 3  Exercise 4: supine Pball LTR, DKTC, bridges x10 ea  Exercise 6: core strentght: bent knee fall out, supine march x10  Exercise 7: Nu step L2 x5 min  Exercise 8: seated hams str 30 sec/ 3  Exercise 20: HEP: continue current     Modalities:  Moist Heat (CPT 63171)  Patient Position: Seated  Moist heat location: Low back, Hip, Left  Post treatment skin assessment: Intact  Limitations addressed: Pain modulation, Tissue extensibility  Functional ability(s) targeted: Tolerance to age appropriate activities  Unbillable time (minutes): 10  Electric

## 2025-07-07 ENCOUNTER — HOSPITAL ENCOUNTER (OUTPATIENT)
Dept: PHYSICAL THERAPY | Age: 65
Setting detail: THERAPIES SERIES
Discharge: HOME | End: 2025-07-07
Payer: MEDICARE

## 2025-07-07 PROCEDURE — 97110 THERAPEUTIC EXERCISES: CPT

## 2025-07-07 PROCEDURE — G0283 ELEC STIM OTHER THAN WOUND: HCPCS

## 2025-07-07 ASSESSMENT — PAIN DESCRIPTION - LOCATION: LOCATION: LEG

## 2025-07-07 ASSESSMENT — PAIN DESCRIPTION - DESCRIPTORS: DESCRIPTORS: THROBBING

## 2025-07-07 ASSESSMENT — PAIN SCALES - GENERAL: PAINLEVEL_OUTOF10: 5

## 2025-07-07 ASSESSMENT — PAIN DESCRIPTION - ORIENTATION: ORIENTATION: LEFT

## 2025-07-07 NOTE — PROGRESS NOTES
[facility]  Outpatient Physical Therapy    Treatment Note        Date: 2025  Patient: Willis Mahajan  : 1960   Confirmed: Yes  MRN: [id number]  Referring Provider: Enrico Mendes APRN - C*   Secondary Referring Provider (If applicable):     Medical Diagnosis: Degeneration of intervertebral disc of lumbar region with discogenic back pain [M51.360]    Treatment Diagnosis: difficulty with gait, impaired mobility, LBP, Lt LE radicular pain, Lt LE weakness, decreased flexibility    Visit Information:  Insurance: Payor: MEDICARE / Plan: MEDICARE PART A AND B / Product Type: *No Product type* /   PT Visit Information  Onset Date:  (2 wks ago)  PT Insurance Information: Medicare  Total # of Visits Approved:  (based on medical necessity)  Total # of Visits to Date: 5  Plan of Care/Certification Expiration Date: 25  No Show: 0  Progress Note Due Date: 25  Canceled Appointment: 0  Progress Note Counter: 5/10    Subjective Information:  Subjective: Pt states \"In my leg, about a 5. My back is fine\" in terms of pain. Pt states \"I don't get those sharp pain in my back anymore.\" Pt notes most pain to be in the evenings.  HEP Compliance:  [x] Good [] Fair [] Poor [] Reports not doing due to:    Pain Screening  Patient Currently in Pain: Yes  Pain Assessment: 0-10  Pain Level: 5  Pain Location: Leg  Pain Orientation: Left  Pain Descriptors: Throbbing    Treatment:  Exercises:  Exercises  Exercise 2: LTR x10 with pelvic tilt  Exercise 3: supine piriformis str and knee to opposite shoulder 30 sec/ 3  Exercise 4: supine Pball LTR, DKTC, bridges x10 ea  Exercise 6: core strentght: bent knee fall out, supine march x10  Exercise 7: Nu step L2 x5 min  Exercise 8: seated hams str 30 sec/ 3  Exercise 9: Sink exs x10 ea  Exercise 16: Step ups on 4\" box x10 b/l fwd only  Exercise 20: HEP: continue current, sink exs    Modalities:  Moist Heat (CPT 93493)  Patient Position: Seated  Moist heat

## 2025-07-09 ENCOUNTER — HOSPITAL ENCOUNTER (OUTPATIENT)
Dept: PHYSICAL THERAPY | Age: 65
Setting detail: THERAPIES SERIES
Discharge: HOME | End: 2025-07-09
Payer: MEDICARE

## 2025-07-09 NOTE — PROGRESS NOTES
[facility]            Physical Therapy  Cancellation/No-show Note  Patient Name:  Willis Mahajan  :  1960   Date:  2025     Diagnosis: Degeneration of intervertebral disc of lumbar region with discogenic back pain    Visit Information:  PT Visit Information  Onset Date:  (2 wks ago)  PT Insurance Information: Medicare  Total # of Visits Approved:  (based on medical necessity)  Total # of Visits to Date: 5  Plan of Care/Certification Expiration Date: 25  No Show: 1  Progress Note Due Date: 25  Canceled Appointment: 0  Progress Note Counter: 5/10    For today's appointment patient:  []  Cancelled  []  Rescheduled appointment  [x]  No-show   [x]  Called pt to reschedule. No further appts scheduled.      Reason given by patient:  []  Patient ill  []  Conflicting appointment  []  No transportation    []  Conflict with work  []  No reason given  []  Inclement weather   []  Other:       Comments:       Signature: Electronically signed by Meliza Albrecht PT on 25 at 10:19 AM EDT

## 2025-07-10 ENCOUNTER — HOSPITAL ENCOUNTER (OUTPATIENT)
Dept: PHYSICAL THERAPY | Age: 65
Setting detail: THERAPIES SERIES
Discharge: HOME | End: 2025-07-10
Payer: MEDICARE

## 2025-07-10 PROCEDURE — G0283 ELEC STIM OTHER THAN WOUND: HCPCS

## 2025-07-10 PROCEDURE — 97116 GAIT TRAINING THERAPY: CPT

## 2025-07-10 PROCEDURE — 97110 THERAPEUTIC EXERCISES: CPT

## 2025-07-10 ASSESSMENT — PAIN SCALES - GENERAL: PAINLEVEL_OUTOF10: 6

## 2025-07-10 ASSESSMENT — PAIN DESCRIPTION - LOCATION: LOCATION: LEG

## 2025-07-10 ASSESSMENT — PAIN DESCRIPTION - ORIENTATION: ORIENTATION: LEFT

## 2025-07-10 ASSESSMENT — PAIN DESCRIPTION - DESCRIPTORS: DESCRIPTORS: ACHING

## 2025-07-10 NOTE — PROGRESS NOTES
[facility]  Outpatient Physical Therapy    Treatment Note        Date: 7/10/2025  Patient: Willis Mahajan  : 1960   Confirmed: Yes  MRN: [id number]  Referring Provider: Enrico Mendes APRN - C*   Secondary Referring Provider (If applicable):     Medical Diagnosis: Degeneration of intervertebral disc of lumbar region with discogenic back pain [M51.360]    Treatment Diagnosis: difficulty with gait, impaired mobility, LBP, Lt LE radicular pain, Lt LE weakness, decreased flexibility    Visit Information:  Insurance: Payor: MEDICARE / Plan: MEDICARE PART A AND B / Product Type: *No Product type* /   PT Visit Information  PT Insurance Information: Medicare  Total # of Visits to Date: 6  Plan of Care/Certification Expiration Date: 25  No Show: 1  Progress Note Due Date: 25  Canceled Appointment: 0  Progress Note Counter: 6/10    Subjective Information:  Subjective: Pt reporting increased pain this date  HEP Compliance:  [x] Good [] Fair [] Poor [] Reports not doing due to:    Pain Screening  Patient Currently in Pain: Yes  Pain Assessment: 0-10  Pain Level: 6  Pain Location: Leg  Pain Orientation: Left  Pain Descriptors: Aching    Treatment:  Exercises:  Exercises  Exercise 3: seated piriformis str 30 sec/3  Exercise 7: Nu step L2 x5 min  Exercise 8: seated hams str 30 sec/ 3  Exercise 10: gait trg w/ ' on conrete  Exercise 12: single steps with focus on glute contraction x10  Exercise 16: Step ups on 6\" box x10 b/l fwd only  Exercise 20: HEP: continue current, sink exs       Modalities:  Moist Heat (CPT 61392)  Patient Position: Seated  Moist heat location: Low back, Hip, Left  Post treatment skin assessment: Intact  Limitations addressed: Pain modulation, Tissue extensibility  Functional ability(s) targeted: Tolerance to age appropriate activities  Unbillable time (minutes): 10  Electric stimulation, unattended (CPT 51503) /  (Medicare)  E-stim location: Left,

## 2025-07-15 ENCOUNTER — TELEPHONE (OUTPATIENT)
Dept: FAMILY MEDICINE CLINIC | Age: 65
End: 2025-07-15

## 2025-07-15 ENCOUNTER — HOSPITAL ENCOUNTER (OUTPATIENT)
Dept: PHYSICAL THERAPY | Age: 65
Setting detail: THERAPIES SERIES
Discharge: HOME | End: 2025-07-15
Payer: MEDICARE

## 2025-07-15 PROCEDURE — 97140 MANUAL THERAPY 1/> REGIONS: CPT

## 2025-07-15 PROCEDURE — 97110 THERAPEUTIC EXERCISES: CPT

## 2025-07-15 PROCEDURE — 97116 GAIT TRAINING THERAPY: CPT

## 2025-07-15 PROCEDURE — G0283 ELEC STIM OTHER THAN WOUND: HCPCS

## 2025-07-15 ASSESSMENT — PAIN DESCRIPTION - DESCRIPTORS: DESCRIPTORS: THROBBING

## 2025-07-15 ASSESSMENT — PAIN SCALES - GENERAL: PAINLEVEL_OUTOF10: 8

## 2025-07-15 ASSESSMENT — PAIN DESCRIPTION - LOCATION: LOCATION: LEG

## 2025-07-15 ASSESSMENT — PAIN DESCRIPTION - ORIENTATION: ORIENTATION: LEFT

## 2025-07-15 NOTE — TELEPHONE ENCOUNTER
LM on VM that we did receive his paperwork for his diabetic shoes and that we can not send it over until after his appt next week. Needs current OV.

## 2025-07-15 NOTE — PROGRESS NOTES
[facility]  Outpatient Physical Therapy    Treatment Note        Date: 7/15/2025  Patient: Willis Mahajan  : 1960   Confirmed: Yes  MRN: [id number]  Referring Provider: Enrico Mendes APRN - C*   Secondary Referring Provider (If applicable):     Medical Diagnosis: Degeneration of intervertebral disc of lumbar region with discogenic back pain [M51.360]    Treatment Diagnosis: difficulty with gait, impaired mobility, LBP, Lt LE radicular pain, Lt LE weakness, decreased flexibility    Visit Information:  Insurance: Payor: MEDICARE / Plan: MEDICARE PART A AND B / Product Type: *No Product type* /   PT Visit Information  PT Insurance Information: Medicare  Total # of Visits to Date: 7  Plan of Care/Certification Expiration Date: 25  No Show: 1  Progress Note Due Date: 25  Canceled Appointment: 0  Progress Note Counter: 7/10    Subjective Information:  Subjective: Pt reporting increased pain in left lateral leg starting yesterday with no known cause. \"It feels like a brick.\" Lt LE did give out on the stairs the other day, but pt did not fall.  HEP Compliance:  [x] Good [] Fair [] Poor [] Reports not doing due to:    Pain Screening  Patient Currently in Pain: Yes  Pain Assessment: 0-10  Pain Level: 8  Pain Location: Leg  Pain Orientation: Left  Pain Descriptors: Throbbing    Treatment:  Exercises:  Exercises  Exercise 1: standing wall ext 30 sec/ 2  Exercise 7: Nu step L2 x5 min  Exercise 11: std wt shifts x3 way/ 10  Exercise 13: prone on elbows 30 sec/  Exercise 17: hooklying fall out single and double x10; hooklying walk out x5  Exercise 18: thoracic open book  Exercise 20: HEP: continue current       Manual:   Manual Therapy  Soft Tissue Mobilizaton: piriformis/ sciatic area, lumbar paraspinals tennis ball  Treatment Reasoning  Functional ability(s) targeted: Tolerance to age appropriate activities    Modalities:  Moist Heat (CPT 09443)  Patient Position: Seated  Moist

## 2025-07-17 ENCOUNTER — HOSPITAL ENCOUNTER (OUTPATIENT)
Dept: PHYSICAL THERAPY | Age: 65
Setting detail: THERAPIES SERIES
Discharge: HOME | End: 2025-07-17
Payer: MEDICARE

## 2025-07-17 PROCEDURE — 97110 THERAPEUTIC EXERCISES: CPT

## 2025-07-17 PROCEDURE — 97140 MANUAL THERAPY 1/> REGIONS: CPT

## 2025-07-17 PROCEDURE — 97112 NEUROMUSCULAR REEDUCATION: CPT

## 2025-07-17 PROCEDURE — G0283 ELEC STIM OTHER THAN WOUND: HCPCS

## 2025-07-17 ASSESSMENT — PAIN DESCRIPTION - DESCRIPTORS: DESCRIPTORS: THROBBING

## 2025-07-17 ASSESSMENT — PAIN SCALES - GENERAL: PAINLEVEL_OUTOF10: 6

## 2025-07-17 ASSESSMENT — PAIN DESCRIPTION - ORIENTATION: ORIENTATION: LEFT

## 2025-07-17 ASSESSMENT — PAIN DESCRIPTION - LOCATION: LOCATION: LEG

## 2025-07-17 NOTE — PROGRESS NOTES
[facility]  Outpatient Physical Therapy    Treatment Note        Date: 2025  Patient: Willis Mahajan  : 1960   Confirmed: Yes  MRN: [id number]  Referring Provider: Enrico Mendes APRN - C*   Secondary Referring Provider (If applicable):     Medical Diagnosis: Degeneration of intervertebral disc of lumbar region with discogenic back pain [M51.360] Degeneration of intervertebral disc of lumbar region with discogenic back pain  Treatment Diagnosis: difficulty with gait, impaired mobility, LBP, Lt LE radicular pain, Lt LE weakness, decreased flexibility    Visit Information:  Insurance: Payor: MEDICARE / Plan: MEDICARE PART A AND B / Product Type: *No Product type* /   PT Visit Information  Onset Date:  (2 wks ago)  PT Insurance Information: Medicare  Total # of Visits Approved:  (based on medical necessity)  Total # of Visits to Date: 8  Plan of Care/Certification Expiration Date: 25  No Show: 1  Progress Note Due Date: 25  Canceled Appointment: 0  Progress Note Counter:     Subjective Information:  Subjective: Pt reports therapy is helping. Felt good after PT last visit x1 day. Able to start swim spa yesterday with some improvement. pt reports pain continues to experience radicular pain to ankle, primarily at night.  HEP Compliance:  [x] Good [] Fair [] Poor [] Reports not doing due to:    Pain Screening  Pain Level: 6  Pain Location: Leg  Pain Orientation: Left  Pain Descriptors: Throbbing    Treatment:  Exercises:  Exercises  Exercise 2: gait drills: , lat, march 2x15 ft ea  Exercise 4: supine Pball LTR, DKTC, bridges x10 ea  Exercise 7: Nu step L3 x5 with VCs for DLS  Exercise 12: single steps fwd and lat with arm swing  Exercise 19: 360 turns x3 bilaterally with VCs for upright posture and DLS  Exercise 20: HEP: cont current, add gait drills as tolerated       Manual:   Manual Therapy  Soft Tissue Mobilizaton: piriformis/ sciatic area, lumbar

## 2025-07-21 ENCOUNTER — HOSPITAL ENCOUNTER (OUTPATIENT)
Dept: PHYSICAL THERAPY | Age: 65
Setting detail: THERAPIES SERIES
Discharge: HOME | End: 2025-07-21
Payer: MEDICARE

## 2025-07-21 PROCEDURE — 97032 APPL MODALITY 1+ESTIM EA 15: CPT

## 2025-07-21 PROCEDURE — 97110 THERAPEUTIC EXERCISES: CPT

## 2025-07-21 PROCEDURE — 97140 MANUAL THERAPY 1/> REGIONS: CPT

## 2025-07-21 SDOH — HEALTH STABILITY: PHYSICAL HEALTH: ON AVERAGE, HOW MANY MINUTES DO YOU ENGAGE IN EXERCISE AT THIS LEVEL?: 0 MIN

## 2025-07-21 SDOH — HEALTH STABILITY: PHYSICAL HEALTH: ON AVERAGE, HOW MANY DAYS PER WEEK DO YOU ENGAGE IN MODERATE TO STRENUOUS EXERCISE (LIKE A BRISK WALK)?: 0 DAYS

## 2025-07-21 ASSESSMENT — LIFESTYLE VARIABLES
HOW OFTEN DO YOU HAVE SIX OR MORE DRINKS ON ONE OCCASION: 1
HOW MANY STANDARD DRINKS CONTAINING ALCOHOL DO YOU HAVE ON A TYPICAL DAY: 0
HOW OFTEN DO YOU HAVE A DRINK CONTAINING ALCOHOL: 1
HOW OFTEN DO YOU HAVE A DRINK CONTAINING ALCOHOL: NEVER
HOW MANY STANDARD DRINKS CONTAINING ALCOHOL DO YOU HAVE ON A TYPICAL DAY: PATIENT DOES NOT DRINK

## 2025-07-21 ASSESSMENT — PATIENT HEALTH QUESTIONNAIRE - PHQ9
SUM OF ALL RESPONSES TO PHQ QUESTIONS 1-9: 2
SUM OF ALL RESPONSES TO PHQ QUESTIONS 1-9: 2
2. FEELING DOWN, DEPRESSED OR HOPELESS: SEVERAL DAYS
SUM OF ALL RESPONSES TO PHQ QUESTIONS 1-9: 2
1. LITTLE INTEREST OR PLEASURE IN DOING THINGS: SEVERAL DAYS
SUM OF ALL RESPONSES TO PHQ QUESTIONS 1-9: 2

## 2025-07-21 ASSESSMENT — PAIN SCALES - GENERAL: PAINLEVEL_OUTOF10: 2

## 2025-07-21 ASSESSMENT — PAIN DESCRIPTION - ORIENTATION: ORIENTATION: LEFT

## 2025-07-21 ASSESSMENT — PAIN DESCRIPTION - LOCATION: LOCATION: BACK;LEG

## 2025-07-21 ASSESSMENT — PAIN DESCRIPTION - DESCRIPTORS: DESCRIPTORS: ACHING

## 2025-07-21 NOTE — PROGRESS NOTES
[facility]  Outpatient Physical Therapy    Treatment Note        Date: 2025  Patient: Willis Mahajan  : 1960   Confirmed: Yes  MRN: [id number]  Referring Provider: Enrico Mendes APRN - C*   Secondary Referring Provider (If applicable):     Medical Diagnosis: Degeneration of intervertebral disc of lumbar region with discogenic back pain [M51.360]    Treatment Diagnosis: difficulty with gait, impaired mobility, LBP, Lt LE radicular pain, Lt LE weakness, decreased flexibility    Visit Information:  Insurance: Payor: MEDICARE / Plan: MEDICARE PART A AND B / Product Type: *No Product type* /   PT Visit Information  Onset Date:  (2 wks ago)  PT Insurance Information: Medicare  Total # of Visits Approved:  (based on medical necessity)  Total # of Visits to Date: 9  Plan of Care/Certification Expiration Date: 25  No Show: 1  Progress Note Due Date: 25  Canceled Appointment: 0  Progress Note Counter:     Subjective Information:  Subjective: \"past 2 days woke up without any pain, able to sleep without tossing and turning\"  HEP Compliance:  [x] Good [] Fair [] Poor [] Reports not doing due to:    Pain Screening  Patient Currently in Pain: Yes  Pain Assessment: 0-10  Pain Level: 2  Pain Location: Back, Leg  Pain Orientation: Left  Pain Descriptors: Aching    Treatment:  Exercises:  Exercises  Exercise 2: gait drills: , lat, march 2x15 ft ea  Exercise 4: supine Pball LTR, DKTC, bridges x10 ea  Exercise 6: anti rotation walkouts w/ YTB 5 sec/5  Exercise 7: Nu step L3 x5 with VCs for DLS  Exercise 20: HEP: cont current, add gait drills as tolerated       Manual:   Manual Therapy  Soft Tissue Mobilizaton: Lt lumbar paraspinals tennis ball, foam roller to Lt quad  Other: MFD cupping to Lt quad dynamic/static       Modalities:  Moist Heat (CPT 25086)  Patient Position: Seated  Moist heat location: Low back, Hip, Left  Post treatment skin assessment: Intact  Limitations

## 2025-07-22 ENCOUNTER — OFFICE VISIT (OUTPATIENT)
Dept: FAMILY MEDICINE CLINIC | Age: 65
End: 2025-07-22
Payer: MEDICARE

## 2025-07-22 VITALS
DIASTOLIC BLOOD PRESSURE: 80 MMHG | HEIGHT: 70 IN | HEART RATE: 89 BPM | BODY MASS INDEX: 41.37 KG/M2 | WEIGHT: 289 LBS | OXYGEN SATURATION: 97 % | SYSTOLIC BLOOD PRESSURE: 108 MMHG

## 2025-07-22 DIAGNOSIS — Z12.11 COLON CANCER SCREENING: ICD-10-CM

## 2025-07-22 DIAGNOSIS — L97.512 ULCER OF RIGHT FOOT WITH FAT LAYER EXPOSED (HCC): ICD-10-CM

## 2025-07-22 DIAGNOSIS — E11.621 TYPE 2 DIABETES MELLITUS WITH FOOT ULCER (CODE) (HCC): ICD-10-CM

## 2025-07-22 DIAGNOSIS — Z12.5 PROSTATE CANCER SCREENING: ICD-10-CM

## 2025-07-22 DIAGNOSIS — E08.641 DIABETES MELLITUS DUE TO UNDERLYING CONDITION, UNCONTROLLED, WITH HYPOGLYCEMIA AND COMA (HCC): ICD-10-CM

## 2025-07-22 DIAGNOSIS — Z13.220 LIPID SCREENING: ICD-10-CM

## 2025-07-22 DIAGNOSIS — Z00.00 WELCOME TO MEDICARE PREVENTIVE VISIT: Primary | ICD-10-CM

## 2025-07-22 PROCEDURE — 3017F COLORECTAL CA SCREEN DOC REV: CPT | Performed by: NURSE PRACTITIONER

## 2025-07-22 PROCEDURE — 3046F HEMOGLOBIN A1C LEVEL >9.0%: CPT | Performed by: NURSE PRACTITIONER

## 2025-07-22 PROCEDURE — G8417 CALC BMI ABV UP PARAM F/U: HCPCS | Performed by: NURSE PRACTITIONER

## 2025-07-22 PROCEDURE — G8427 DOCREV CUR MEDS BY ELIG CLIN: HCPCS | Performed by: NURSE PRACTITIONER

## 2025-07-22 PROCEDURE — 99213 OFFICE O/P EST LOW 20 MIN: CPT | Performed by: NURSE PRACTITIONER

## 2025-07-22 PROCEDURE — G0402 INITIAL PREVENTIVE EXAM: HCPCS | Performed by: NURSE PRACTITIONER

## 2025-07-22 PROCEDURE — 3079F DIAST BP 80-89 MM HG: CPT | Performed by: NURSE PRACTITIONER

## 2025-07-22 PROCEDURE — 1036F TOBACCO NON-USER: CPT | Performed by: NURSE PRACTITIONER

## 2025-07-22 PROCEDURE — 2022F DILAT RTA XM EVC RTNOPTHY: CPT | Performed by: NURSE PRACTITIONER

## 2025-07-22 PROCEDURE — 3074F SYST BP LT 130 MM HG: CPT | Performed by: NURSE PRACTITIONER

## 2025-07-22 PROCEDURE — 1123F ACP DISCUSS/DSCN MKR DOCD: CPT | Performed by: NURSE PRACTITIONER

## 2025-07-22 NOTE — PATIENT INSTRUCTIONS
Learning About Being Active as an Older Adult  Why is being active important as you get older?     Being active is one of the best things you can do for your health. And it's never too late to start. Being active--or getting active, if you aren't already--has definite benefits. It can:  Give you more energy,  Keep your mind sharp.  Improve balance to reduce your risk of falls.  Help you manage chronic illness with fewer medicines.  No matter how old you are, how fit you are, or what health problems you have, there is a form of activity that will work for you. And the more physical activity you can do, the better your overall health will be.  What kinds of activity can help you stay healthy?  Being more active will make your daily activities easier. Physical activity includes planned exercise and things you do in daily life. There are four types of activity:  Aerobic.  Doing aerobic activity makes your heart and lungs strong.  Includes walking, dancing, and gardening.  Aim for at least 2½ hours spread throughout the week.  It improves your energy and can help you sleep better.  Muscle-strengthening.  This type of activity can help maintain muscle and strengthen bones.  Includes climbing stairs, using resistance bands, and lifting or carrying heavy loads.  Aim for at least twice a week.  It can help protect the knees and other joints.  Stretching.  Stretching gives you better range of motion in joints and muscles.  Includes upper arm stretches, calf stretches, and gentle yoga.  Aim for at least twice a week, preferably after your muscles are warmed up from other activities.  It can help you function better in daily life.  Balancing.  This helps you stay coordinated and have good posture.  Includes heel-to-toe walking, ton chi, and certain types of yoga.  Aim for at least 3 days a week.  It can reduce your risk of falling.  Even if you have a hard time meeting the recommendations, it's better to be more active

## 2025-07-22 NOTE — PROGRESS NOTES
previous issues. He has been attending physical therapy, which has provided some relief. Last week, he was able to return to his swim spa and is now able to walk without a cane, although he uses it occasionally for balance. He is optimistic about regaining his activity level. He used to walk his dog three times a day but has been inactive for over a year.    Social History:  Coffee/Tea/Caffeine-containing Drinks: Drinks coffee    PAST SURGICAL HISTORY:  Toe surgery: 2 years ago  Toe surgery: 04/2025      Patient's complete Health Risk Assessment and screening values have been reviewed and are found in Flowsheets. The following problems were reviewed today and where indicated follow up appointments were made and/or referrals ordered.    Positive Risk Factor Screenings with Interventions:              Inactivity:  On average, how many days per week do you engage in moderate to strenuous exercise (like a brisk walk)?: 0 days (!) Abnormal  On average, how many minutes do you engage in exercise at this level?: 0 min  Interventions:  See AVS for additional education material     Abnormal BMI (obese):  Body mass index is 41.47 kg/m². (!) Abnormal  Interventions:  See AVS for additional education material              Advanced Directives:  Do you have a Living Will?: (!) No    Intervention:  has NO advanced directive - information provided                     Objective   Vitals:    07/22/25 0939   BP: 108/80   BP Site: Left Upper Arm   Patient Position: Sitting   BP Cuff Size: Medium Adult   Pulse: 89   SpO2: 97%   Weight: 131.1 kg (289 lb)   Height: 1.778 m (5' 10\")      Body mass index is 41.47 kg/m².        Physical Exam  Mouth/Throat: Oral exam normal.  Respiratory: Clear to auscultation, no wheezing, rales or rhonchi.     Physical Exam  Vitals reviewed.   Constitutional:       General: He is not in acute distress.     Appearance: Normal appearance. He is well-developed. He is obese. He is not diaphoretic.   HENT:

## 2025-07-23 ENCOUNTER — HOSPITAL ENCOUNTER (OUTPATIENT)
Dept: PHYSICAL THERAPY | Age: 65
Setting detail: THERAPIES SERIES
Discharge: HOME | End: 2025-07-23
Payer: MEDICARE

## 2025-07-23 PROCEDURE — G0283 ELEC STIM OTHER THAN WOUND: HCPCS

## 2025-07-23 PROCEDURE — 97110 THERAPEUTIC EXERCISES: CPT

## 2025-07-23 PROCEDURE — 97116 GAIT TRAINING THERAPY: CPT

## 2025-07-23 ASSESSMENT — PAIN DESCRIPTION - DESCRIPTORS: DESCRIPTORS: ACHING

## 2025-07-23 ASSESSMENT — PAIN DESCRIPTION - ORIENTATION: ORIENTATION: LEFT

## 2025-07-23 ASSESSMENT — PAIN DESCRIPTION - LOCATION: LOCATION: BACK;LEG

## 2025-07-23 ASSESSMENT — PAIN SCALES - GENERAL: PAINLEVEL_OUTOF10: 2

## 2025-07-23 NOTE — PROGRESS NOTES
[facility]  Outpatient Physical Therapy    Treatment Note        Date: 2025  Patient: Willis Mahajan  : 1960   Confirmed: Yes  MRN: [id number]  Referring Provider: Enrico Mendes APRN - C*   Secondary Referring Provider (If applicable):     Medical Diagnosis: Degeneration of intervertebral disc of lumbar region with discogenic back pain [M51.360]    Treatment Diagnosis: difficulty with gait, impaired mobility, LBP, Lt LE radicular pain, Lt LE weakness, decreased flexibility    Visit Information:  Insurance: Payor: MEDICARE / Plan: MEDICARE PART A AND B / Product Type: *No Product type* /   PT Visit Information  Onset Date:  (2 wks ago)  PT Insurance Information: Medicare  Total # of Visits Approved:  (based on medical necessity)  Total # of Visits to Date: 10  Plan of Care/Certification Expiration Date: 25  No Show: 1  Progress Note Due Date: 25  Canceled Appointment: 0  Progress Note Counter: 3/8    Subjective Information:  Subjective: Pt reports \"my leg feels alot better\" and \"I've been walking alot without my cane\"  HEP Compliance:  [x] Good [] Fair [] Poor [] Reports not doing due to:    Pain Screening  Patient Currently in Pain: Yes  Pain Assessment: 0-10  Pain Level: 2  Pain Location: Back, Leg  Pain Orientation: Left  Pain Descriptors: Aching    Treatment:  Exercises:  Exercises  Exercise 6: anti rotation walkouts w/ YTB 5 sec/, anti rotation press x10 w/ YTB  Exercise 7: Nu step L3 x5 with VCs for DLS  Exercise 10: Gait training outside w/ AD 1600' (increased \"weakness\" in legs)  Exercise 12: single steps of robert fwd and lat x10 with rec arm swing  Exercise 20: HEP: cont current, add gait drills as tolerated     Modalities:  Moist Heat (CPT 20139)  Patient Position: Seated  Moist heat location: Low back, Hip, Left  Post treatment skin assessment: Intact  Limitations addressed: Pain modulation, Tissue extensibility  Functional ability(s) targeted:

## 2025-07-24 DIAGNOSIS — E08.641 DIABETES MELLITUS DUE TO UNDERLYING CONDITION, UNCONTROLLED, WITH HYPOGLYCEMIA AND COMA (HCC): ICD-10-CM

## 2025-07-24 DIAGNOSIS — Z00.00 WELCOME TO MEDICARE PREVENTIVE VISIT: ICD-10-CM

## 2025-07-24 DIAGNOSIS — E11.621 TYPE 2 DIABETES MELLITUS WITH FOOT ULCER (CODE) (HCC): ICD-10-CM

## 2025-07-24 DIAGNOSIS — Z12.5 PROSTATE CANCER SCREENING: ICD-10-CM

## 2025-07-24 LAB
ALBUMIN SERPL-MCNC: 3.9 G/DL (ref 3.5–4.6)
ALP SERPL-CCNC: 102 U/L (ref 35–104)
ALT SERPL-CCNC: 16 U/L (ref 0–41)
AST SERPL-CCNC: 19 U/L (ref 0–40)
BILIRUB DIRECT SERPL-MCNC: <0.1 MG/DL (ref 0–0.4)
BILIRUB INDIRECT SERPL-MCNC: 0.3 MG/DL (ref 0–0.6)
BILIRUB SERPL-MCNC: 0.4 MG/DL (ref 0.2–0.7)
CHOLEST SERPL-MCNC: 271 MG/DL (ref 0–199)
ERYTHROCYTE [DISTWIDTH] IN BLOOD BY AUTOMATED COUNT: 12.6 % (ref 11.5–14.5)
HCT VFR BLD AUTO: 43 % (ref 42–52)
HDLC SERPL-MCNC: 51 MG/DL (ref 40–59)
HGB BLD-MCNC: 13.6 G/DL (ref 14–18)
LDLC SERPL CALC-MCNC: 204 MG/DL (ref 0–129)
MCH RBC QN AUTO: 28.8 PG (ref 27–31.3)
MCHC RBC AUTO-ENTMCNC: 31.6 % (ref 33–37)
MCV RBC AUTO: 90.9 FL (ref 79–92.2)
PLATELET # BLD AUTO: 226 K/UL (ref 130–400)
PROT SERPL-MCNC: 6.9 G/DL (ref 6.3–8)
PSA SERPL-MCNC: 0.35 NG/ML (ref 0–4)
RBC # BLD AUTO: 4.73 M/UL (ref 4.7–6.1)
TRIGL SERPL-MCNC: 80 MG/DL (ref 0–150)
WBC # BLD AUTO: 6.1 K/UL (ref 4.8–10.8)

## 2025-07-24 RX ORDER — POLYETHYLENE GLYCOL 3350, SODIUM CHLORIDE, SODIUM BICARBONATE, POTASSIUM CHLORIDE 420; 11.2; 5.72; 1.48 G/4L; G/4L; G/4L; G/4L
4000 POWDER, FOR SOLUTION ORAL ONCE
Qty: 4000 ML | Refills: 0 | Status: SHIPPED | OUTPATIENT
Start: 2025-07-24 | End: 2025-07-24

## 2025-07-29 ENCOUNTER — HOSPITAL ENCOUNTER (OUTPATIENT)
Dept: PHYSICAL THERAPY | Age: 65
Setting detail: THERAPIES SERIES
Discharge: HOME | End: 2025-07-29
Payer: MEDICARE

## 2025-07-29 PROCEDURE — 97110 THERAPEUTIC EXERCISES: CPT

## 2025-07-29 PROCEDURE — 97116 GAIT TRAINING THERAPY: CPT

## 2025-07-29 NOTE — PROGRESS NOTES
[facility]  Outpatient Physical Therapy    Treatment Note        Date: 2025  Patient: Willis Mahajan  : 1960   Confirmed: Yes  MRN: [id number]  Referring Provider: Enrico Mendes APRN - C*   Secondary Referring Provider (If applicable):     Medical Diagnosis: Degeneration of intervertebral disc of lumbar region with discogenic back pain [M51.360]    Treatment Diagnosis: difficulty with gait, impaired mobility, LBP, Lt LE radicular pain, Lt LE weakness, decreased flexibility    Visit Information:  Insurance: Payor: MEDICARE / Plan: MEDICARE PART A AND B / Product Type: *No Product type* /   PT Visit Information  Onset Date:  (2 wks ago)  PT Insurance Information: Medicare  Total # of Visits Approved:  (based on medical necessity)  Total # of Visits to Date:   Plan of Care/Certification Expiration Date: 25  No Show: 1  Progress Note Due Date: 25  Canceled Appointment: 0  Progress Note Counter:     Subjective Information:  Subjective: Pt reports not using SPC for ambulation since Saturday.  Pt reports stiffness in LB.  HEP Compliance:  [x] Good [] Fair [] Poor [] Reports not doing due to:    Pain Screening  Patient Currently in Pain: Denies    Treatment:  Exercises:  Exercises  Exercise 4: supine bridge w/ RTB x10, h/l hip abd w/RTB x10  Exercise 6: supine walkouts x5, bent knee fallout w/ RTB x10, deadbug x10  Exercise 7: Nu step L3 x5 with VCs for DLS  Exercise 8: seated hams str 30 sec/ 3  Exercise 10: Gait training outside w/ AD 1600' (increased \"weakness\" in legs)  Exercise 20: HEP:  Treatment Reasoning  Limitations addressed: Mobility, Strength, Flexibility, Activity tolerance    *Indicates exercise, modality, or manual techniques to be initiated when appropriate    Objective Measures:       PROM LLE (degrees)  L SLR: 80 deg   PROM RLE (degrees)  R SLR: 85 deg       Spine  Lumbar: ext 15, flex 54, Rt SB 28, Lt SB 32 with increased pain  Spine  Lumbar: ext

## 2025-07-31 ENCOUNTER — HOSPITAL ENCOUNTER (OUTPATIENT)
Dept: PHYSICAL THERAPY | Age: 65
Setting detail: THERAPIES SERIES
Discharge: HOME | End: 2025-07-31
Payer: MEDICARE

## 2025-07-31 PROCEDURE — 97110 THERAPEUTIC EXERCISES: CPT

## 2025-07-31 PROCEDURE — 97140 MANUAL THERAPY 1/> REGIONS: CPT

## 2025-07-31 NOTE — PROGRESS NOTES
[facility]  Outpatient Physical Therapy    Treatment Note        Date: 2025  Patient: Willis Mahajan  : 1960   Confirmed: Yes  MRN: [id number]  Referring Provider: Enrico Mendes APRN - CNP    Medical Diagnosis: Degeneration of intervertebral disc of lumbar region with discogenic back pain [M51.360]       Treatment Diagnosis: difficulty with gait, impaired mobility, LBP, Lt LE radicular pain, Lt LE weakness, decreased flexibility    Visit Information:  Insurance: Payor: MEDICARE / Plan: MEDICARE PART A AND B / Product Type: *No Product type* /   PT Visit Information  Onset Date:  (2 wks ago)  PT Insurance Information: Medicare  Total # of Visits Approved:  (based on medical necessity)  Total # of Visits to Date:   Plan of Care/Certification Expiration Date: 25  No Show: 1  Progress Note Due Date: 25  Canceled Appointment: 0  Progress Note Counter:     Subjective Information:  Subjective: \"I was stiff this morning but no pain\"  HEP Compliance:  [x] Good [] Fair [] Poor [] Reports not doing due to:    Pain Screening  Patient Currently in Pain: Denies    Treatment:  Exercises:  Exercises  Exercise 4: supine bridge w/ RTB x12  Exercise 6: supine walkouts x5, deadbug x10; DKTC pball X 12 with cues for TA iso  Exercise 7: Nu step L3 x5 with VCs for DLS  Exercise 8: prone knee flexion with strap 30 sec X 3  Exercise 9: standing L hip flexion with knee straight to heel tap x5 with unilat UE support with pt demonstrating difficulty with lifting the L LE  Exercise 10: Gait training outside w/ AD 1600' (increased \"weakness\" in legs)  Exercise 18: s/l reverse clam X 10 R/L  Exercise 20: HEP: continue current  Treatment Reasoning  Limitations addressed: Mobility, Strength, Flexibility, Activity tolerance    Manual:   Manual Therapy  Other: MFD cupping to Lt quad static X 8 min  *Indicates exercise, modality, or manual techniques to be initiated when appropriate    Objective Measures:

## 2025-08-05 ENCOUNTER — HOSPITAL ENCOUNTER (OUTPATIENT)
Dept: PHYSICAL THERAPY | Age: 65
Setting detail: THERAPIES SERIES
Discharge: HOME | End: 2025-08-05
Payer: MEDICARE

## 2025-08-05 PROCEDURE — 97110 THERAPEUTIC EXERCISES: CPT

## 2025-08-05 PROCEDURE — 97116 GAIT TRAINING THERAPY: CPT

## 2025-08-12 ENCOUNTER — HOSPITAL ENCOUNTER (OUTPATIENT)
Dept: PHYSICAL THERAPY | Age: 65
Setting detail: THERAPIES SERIES
Discharge: HOME | End: 2025-08-12
Payer: MEDICARE

## (undated) DEVICE — PAD,ABDOMINAL,8"X10",ST,LF: Brand: MEDLINE

## (undated) DEVICE — SET,IRRIGATION,CYSTO/TUR: Brand: MEDLINE

## (undated) DEVICE — DRESSING GZ W1XL8IN COT XRFRM N ADH OVERWRAP CURAD

## (undated) DEVICE — PADDING,UNDERCAST,COTTON, 4"X4YD STERILE: Brand: MEDLINE

## (undated) DEVICE — BANDAGE,GAUZE,BULKEE II,4.5"X4.1YD,STRL: Brand: MEDLINE

## (undated) DEVICE — COUNTER NDL 40 COUNT HLD 70 FOAM BLK ADH W/ MAG

## (undated) DEVICE — NEPTUNE E-SEP SMOKE EVACUATION PENCIL, COATED, 70MM BLADE, PUSH BUTTON SWITCH: Brand: NEPTUNE E-SEP

## (undated) DEVICE — ELECTRODE PT RET AD L9FT HI MOIST COND ADH HYDRGEL CORDED

## (undated) DEVICE — GOWN,AURORA,NONREINFORCED,LARGE: Brand: MEDLINE

## (undated) DEVICE — GAUZE,PACKING STRIP,PLAIN,1/4"X5YD,STRL: Brand: CURAD

## (undated) DEVICE — PAD ABSRB W8XL10IN ABD HYDROPHOBIC NONWOVEN THCK LAYR CELOS

## (undated) DEVICE — SINGLE PORT MANIFOLD: Brand: NEPTUNE 2

## (undated) DEVICE — ZIMMER® STERILE DISPOSABLE TOURNIQUET CUFF, DUAL PORT, SINGLE BLADDER, 18 IN. (46 CM)

## (undated) DEVICE — BANDAGE COMPR W4INXL5YD WHT BGE POLY COT M E WRP WV HK AND

## (undated) DEVICE — SKIN PREP TRAY 4 COMPARTM TRAY: Brand: MEDLINE INDUSTRIES, INC.

## (undated) DEVICE — PACK,ARTHROSCOPY II,SIRUS: Brand: MEDLINE

## (undated) DEVICE — PADDING CAST W4INXL4YD SPUN DACRON POLY POR NON STERILE

## (undated) DEVICE — SPONGE GZ W4XL4IN COT 12 PLY TYP VII WVN C FLD DSGN STERILE

## (undated) DEVICE — SPONGE,LAP,18"X18",DLX,XR,ST,5/PK,40/PK: Brand: MEDLINE

## (undated) DEVICE — LABEL MED MINI W/ MARKER

## (undated) DEVICE — GAUZE,SPONGE,FLUFF,6"X6.75",STRL,10/TRAY: Brand: MEDLINE

## (undated) DEVICE — SYRINGE IRRIG 60ML SFT PLIABLE BLB EZ TO GRP 1 HND USE W/

## (undated) DEVICE — DRESSING PETRO W3XL8IN OIL EMUL N ADH GZ KNIT IMPREG CELOS

## (undated) DEVICE — MARKER SURG SKIN GENTIAN VLT REG TIP W/ 6IN RUL DYNJSM01

## (undated) DEVICE — SUTURE PROL SZ 3-0 L18IN NONABSORBABLE BLU L19MM PS-2 3/8 8687H

## (undated) DEVICE — 1010 S-DRAPE TOWEL DRAPE 10/BX: Brand: STERI-DRAPE™

## (undated) DEVICE — BLADE,CARBON-STEEL,15,STRL,DISPOSABLE,TB: Brand: MEDLINE

## (undated) DEVICE — COVER LT HNDL BLU PLAS

## (undated) DEVICE — GLOVE ORTHO 7 1/2   MSG9475

## (undated) DEVICE — STERILE PVP: Brand: MEDLINE INDUSTRIES, INC.

## (undated) DEVICE — TUBING, SUCTION, 9/32" X 12', STRAIGHT: Brand: MEDLINE INDUSTRIES, INC.

## (undated) DEVICE — TOWEL,OR,DSP,ST,BLUE,STD,4/PK,20PK/CS: Brand: MEDLINE